# Patient Record
Sex: MALE | Race: WHITE | NOT HISPANIC OR LATINO | Employment: OTHER | URBAN - NONMETROPOLITAN AREA
[De-identification: names, ages, dates, MRNs, and addresses within clinical notes are randomized per-mention and may not be internally consistent; named-entity substitution may affect disease eponyms.]

---

## 2017-04-27 ENCOUNTER — TELEPHONE (OUTPATIENT)
Dept: CARDIOLOGY | Facility: CLINIC | Age: 67
End: 2017-04-27

## 2017-04-27 ENCOUNTER — OFFICE VISIT (OUTPATIENT)
Dept: CARDIOLOGY | Facility: CLINIC | Age: 67
End: 2017-04-27

## 2017-04-27 VITALS
HEART RATE: 64 BPM | DIASTOLIC BLOOD PRESSURE: 68 MMHG | WEIGHT: 264 LBS | SYSTOLIC BLOOD PRESSURE: 110 MMHG | HEIGHT: 71 IN | BODY MASS INDEX: 36.96 KG/M2

## 2017-04-27 DIAGNOSIS — I48.0 PAROXYSMAL ATRIAL FIBRILLATION (HCC): Primary | ICD-10-CM

## 2017-04-27 DIAGNOSIS — Z79.899 LONG TERM CURRENT USE OF ANTIARRHYTHMIC MEDICAL THERAPY: ICD-10-CM

## 2017-04-27 DIAGNOSIS — I45.4 BBB (BUNDLE BRANCH BLOCK): ICD-10-CM

## 2017-04-27 DIAGNOSIS — E78.00 HYPERCHOLESTEREMIA: ICD-10-CM

## 2017-04-27 DIAGNOSIS — I10 ESSENTIAL HYPERTENSION: ICD-10-CM

## 2017-04-27 PROCEDURE — 93000 ELECTROCARDIOGRAM COMPLETE: CPT | Performed by: NURSE PRACTITIONER

## 2017-04-27 PROCEDURE — 99213 OFFICE O/P EST LOW 20 MIN: CPT | Performed by: NURSE PRACTITIONER

## 2017-04-27 RX ORDER — HYDROCODONE BITARTRATE AND ACETAMINOPHEN 5; 325 MG/1; MG/1
TABLET ORAL
COMMUNITY
End: 2017-10-31 | Stop reason: ALTCHOICE

## 2017-04-27 RX ORDER — CELECOXIB 200 MG/1
200 CAPSULE ORAL DAILY PRN
COMMUNITY
End: 2017-10-31 | Stop reason: ALTCHOICE

## 2017-04-27 NOTE — PROGRESS NOTES
Chief Complaint   Patient presents with   • Follow-up     Patient is to have knee replacement 6/27/17, he is asking for cardiac clearance. PCP writes refills on medication. Patient verbalized current medication list.       Subjective       Orlando Wylie is a 66 y.o. male history of PAF diagnosed in 2012 when he underwent cardioversion. In September 2016, he seen Dr. Law to establish cardiac care. He had seen another cardiologist prior, had workup and was told everything was normal. His main concern was being on amiodarone for over 3 years. Since his LV function was normal and no ischemia, it was decided to stop amiodarone and later Flecainide was started. EKG remained sinus.  Today he comes to the office for a follow up appointment and no cardiac complaints are voiced. He has significant right knee pain and asked for cardiac clearance for right knee replacement surgery scheduled for June.     HPI         Cardiac History:    Past Surgical History:   Procedure Laterality Date   • CARDIOVASCULAR STRESS TEST  09/01/2015    @[email or web address] Myoview- Neagtive.   • ECHO - CONVERTED  09/01/2015    @[facility]. Dr. Parker- EF 65%. Mild MR and AI   • TRANSESOPHAGEAL ECHOCARDIOGRAM (SANDRITA) AND CARDIOVERSION  11/05/2012    Dr. MEDELLIN       Current Outpatient Prescriptions   Medication Sig Dispense Refill   • aspirin 81 MG EC tablet Take 81 mg by mouth daily.     • celecoxib (CeleBREX) 200 MG capsule Take 200 mg by mouth Daily As Needed for Mild Pain (1-3).     • doxazosin (CARDURA) 4 MG tablet Take 4 mg by mouth every night.     • flecainide (TAMBOCOR) 50 MG tablet Take 1 tablet by mouth 2 (Two) Times a Day. (Patient taking differently: 1/2 tablet twice daily) 180 tablet 3   • HYDROcodone-acetaminophen (NORCO) 5-325 MG per tablet 1/2 tablet at night     • losartan-hydrochlorothiazide (HYZAAR) 50-12.5 MG per tablet Take 1 tablet by mouth daily.     • metoprolol succinate XL (TOPROL-XL) 25 MG 24 hr tablet Take 25 mg by mouth daily.     •  "Multiple Vitamins-Minerals (MULTIVITAMIN ADULT PO) Take  by mouth daily.     • rosuvastatin (CRESTOR) 10 MG tablet Take 10 mg by mouth daily.       No current facility-administered medications for this visit.        Review of patient's allergies indicates no known allergies.    Past Medical History:   Diagnosis Date   • BPH (benign prostatic hyperplasia)    • Diverticulosis    • Exogenous obesity    • H/O prostate biopsy 06/29/2010    negative findings   • History of colon polyps    • Hyperlipidemia    • Hypertension    • PAF (paroxysmal atrial fibrillation)    • Varicose veins        Social History     Social History   • Marital status:      Spouse name: N/A   • Number of children: N/A   • Years of education: N/A     Occupational History   • Not on file.     Social History Main Topics   • Smoking status: Never Smoker   • Smokeless tobacco: Never Used   • Alcohol use Yes      Comment: occasional beer   • Drug use: No   • Sexual activity: Not on file     Other Topics Concern   • Not on file     Social History Narrative       Family History   Problem Relation Age of Onset   • Atrial fibrillation Mother    • No Known Problems Father    • Hypertension Brother    • No Known Problems Daughter    • No Known Problems Son        Review of Systems   Constitutional: Negative.    HENT: Negative.    Respiratory: Negative.    Cardiovascular: Negative.    Gastrointestinal: Negative.    Genitourinary: Negative.    Musculoskeletal: Positive for arthralgias and gait problem. Negative for myalgias.   Neurological: Negative for dizziness, facial asymmetry, speech difficulty, weakness and light-headedness.   Hematological: Negative.    Psychiatric/Behavioral: Negative.        Diabetes- No  Thyroid-normal    Objective     /68 (BP Location: Left arm)  Pulse 64  Ht 71\" (180.3 cm)  Wt 264 lb (120 kg)  BMI 36.82 kg/m2    Physical Exam   Constitutional: He is oriented to person, place, and time. Vital signs are normal. He " appears well-developed.   Eyes: Pupils are equal, round, and reactive to light.   Neck: Neck supple.   Cardiovascular: Normal rate, regular rhythm, S1 normal, S2 normal and normal pulses.    No murmur heard.  Pulmonary/Chest: Effort normal and breath sounds normal.   Abdominal: Soft. Bowel sounds are normal.   Musculoskeletal: He exhibits no edema.   Neurological: He is alert and oriented to person, place, and time.   Skin: Skin is warm and dry.   Psychiatric: He has a normal mood and affect. His behavior is normal. Judgment and thought content normal.   Vitals reviewed.      ECG 12 Lead  Date/Time: 4/27/2017 8:03 AM  Performed by: JOLLY ARRIETA  Authorized by: JOLLY ARRIETA   Comparison: compared with previous ECG from 10/12/2016  Comparison to previous ECG: Sinus, left axis deviation, incomplete RBBB  Rhythm: sinus rhythm and bundle branch block  Rate: normal  BPM: 66  QRS axis: left  Comments: MA 198ms, QTc 359 ms                Assessment/Plan      Orlando was seen today for follow-up.    Diagnoses and all orders for this visit:    Paroxysmal atrial fibrillation    Essential hypertension    Hypercholesteremia    Long term current use of antiarrhythmic medical therapy    BBB (bundle branch block)    Other orders  -     ECG 12 Lead        [id number] score is 2 (HTN, age > 65) and he has maintained sinus. EKG today remains sinus. Blood pressure is normal. He appears stable from a cardiac standpoint. Cardiac clearance for knee replacement surgery will be discussed with Dr. Law and [facility] will will fax to Dr. Rebollar office. His weight is up a few pounds. I encouraged him on diet to avoid further weight gain. No changes made to cardiac medications. Recommend to continue same dose of Tambocor.             Electronically signed by FARRAH Ryan,  April 27, 2017 10:20 AM

## 2017-04-27 NOTE — TELEPHONE ENCOUNTER
Agree to give cardiac clearance for right knee replacement surgery per Dr. Rebollar scheduled for June? Thanks

## 2017-06-12 ENCOUNTER — DOCUMENTATION (OUTPATIENT)
Dept: CARDIOLOGY | Facility: CLINIC | Age: 67
End: 2017-06-12

## 2017-06-12 NOTE — PROGRESS NOTES
Received call from [facility] preadmission testing ([facility]) requesting previous EKG and Cardiac clearance letter to be faxed to [phone]. Information reqested faxed.

## 2017-10-31 ENCOUNTER — OFFICE VISIT (OUTPATIENT)
Dept: CARDIOLOGY | Facility: CLINIC | Age: 67
End: 2017-10-31

## 2017-10-31 VITALS
WEIGHT: 265 LBS | SYSTOLIC BLOOD PRESSURE: 146 MMHG | HEART RATE: 64 BPM | HEIGHT: 71 IN | DIASTOLIC BLOOD PRESSURE: 90 MMHG | BODY MASS INDEX: 37.1 KG/M2

## 2017-10-31 DIAGNOSIS — I10 ESSENTIAL HYPERTENSION: ICD-10-CM

## 2017-10-31 DIAGNOSIS — E78.00 HYPERCHOLESTEREMIA: ICD-10-CM

## 2017-10-31 DIAGNOSIS — I48.0 PAROXYSMAL ATRIAL FIBRILLATION (HCC): Primary | ICD-10-CM

## 2017-10-31 DIAGNOSIS — Z79.899 LONG TERM CURRENT USE OF ANTIARRHYTHMIC MEDICAL THERAPY: ICD-10-CM

## 2017-10-31 PROCEDURE — 93000 ELECTROCARDIOGRAM COMPLETE: CPT | Performed by: NURSE PRACTITIONER

## 2017-10-31 PROCEDURE — 99213 OFFICE O/P EST LOW 20 MIN: CPT | Performed by: NURSE PRACTITIONER

## 2017-10-31 RX ORDER — ASPIRIN 81 MG/1
81 TABLET ORAL 2 TIMES DAILY
COMMUNITY
End: 2020-08-18

## 2017-10-31 RX ORDER — FLECAINIDE ACETATE 50 MG/1
TABLET ORAL
COMMUNITY
End: 2017-12-20 | Stop reason: SDUPTHER

## 2017-10-31 NOTE — PROGRESS NOTES
Chief Complaint   Patient presents with   • Follow-up     For cardiac management. Last lab work in January or February of this year, not in chart.    • Med Refill     PCP does medication refills.        Cardiac Complaints  none      Subjective   Orlando Wylie is a 66 y.o. male with HTN, hyperlipidemia, and PAF diagnosed in 2012 when he underwent cardioversion. In September 2016, he saw Dr. Law to establish cardiac care. He had seen another cardiologist prior, had workup and was told everything was normal. His main concern was being on amiodarone for over 3 years. Since his LV function was normal and no ischemic burden was found, it was decided to stop amiodarone and  Flecainide was started. EKG has remained sinus. He denies any chest pain, shortness of breath, or palpitations.  He does report having knee replacement in June and then the other knee in August, he reports surgery went well and he had no complications after. Last labs were done with January of February with PCP, he reports everything looked okay.  No refills are needed as they are done with PCP.        Cardiac History  Past Surgical History:   Procedure Laterality Date   • CARDIOVASCULAR STRESS TEST  09/01/2015    @[email or web address]. Myoview- Neagtive.   • ECHO - CONVERTED  09/01/2015    @[facility]. Dr. Parker- EF 65%. Mild MR and AI   • TRANSESOPHAGEAL ECHOCARDIOGRAM (SANDRITA) AND CARDIOVERSION  11/05/2012    Dr. MEDELLIN       Current Outpatient Prescriptions   Medication Sig Dispense Refill   • aspirin 81 MG EC tablet Take 81 mg by mouth 2 (Two) Times a Day.     • doxazosin (CARDURA) 4 MG tablet Take 4 mg by mouth every night.     • flecainide (TAMBOCOR) 50 MG tablet 1/2 tablet twice daily     • losartan-hydrochlorothiazide (HYZAAR) 50-12.5 MG per tablet Take 1 tablet by mouth daily.     • metoprolol succinate XL (TOPROL-XL) 25 MG 24 hr tablet Take 25 mg by mouth daily.     • Multiple Vitamins-Minerals (MULTIVITAMIN ADULT PO) Take  by mouth daily.     • rosuvastatin  "(CRESTOR) 10 MG tablet Take 10 mg by mouth daily.       No current facility-administered medications for this visit.        Review of patient's allergies indicates no known allergies.    Past Medical History:   Diagnosis Date   • BPH (benign prostatic hyperplasia)    • Diverticulosis    • Exogenous obesity    • H/O prostate biopsy 06/29/2010    negative findings   • History of colon polyps    • History of knee replacement    • Hyperlipidemia    • Hypertension    • PAF (paroxysmal atrial fibrillation)    • Varicose veins        Social History     Social History   • Marital status:      Spouse name: N/A   • Number of children: N/A   • Years of education: N/A     Occupational History   • Not on file.     Social History Main Topics   • Smoking status: Never Smoker   • Smokeless tobacco: Never Used   • Alcohol use Yes      Comment: occasional beer   • Drug use: No   • Sexual activity: Not on file     Other Topics Concern   • Not on file     Social History Narrative       Family History   Problem Relation Age of Onset   • Atrial fibrillation Mother    • No Known Problems Father    • Hypertension Brother    • No Known Problems Daughter    • No Known Problems Son        Review of Systems   Constitution: Negative for weakness and malaise/fatigue.   Cardiovascular: Negative for chest pain, claudication, dyspnea on exertion, leg swelling, near-syncope, palpitations and syncope.   Respiratory: Negative for shortness of breath and wheezing.    Musculoskeletal: Negative for arthritis and back pain.   Gastrointestinal: Negative for anorexia, heartburn and nausea.   Genitourinary: Negative for dysuria, hesitancy and nocturia.   Neurological: Negative for dizziness, light-headedness and loss of balance.   Psychiatric/Behavioral: Negative for altered mental status and depression.       DiabetesNo  Thyroidnormal    Objective     /90 (BP Location: Right arm)  Pulse 64  Ht 71\" (180.3 cm)  Wt 265 lb (120 kg)  BMI 36.96 " kg/m2    Physical Exam   Constitutional: He is oriented to person, place, and time. He appears well-developed and well-nourished.   HENT:   Head: Normocephalic and atraumatic.   Eyes: EOM are normal. Pupils are equal, round, and reactive to light.   Neck: Normal range of motion. Neck supple.   Cardiovascular: Normal rate and regular rhythm.    Murmur heard.  Pulmonary/Chest: Effort normal and breath sounds normal.   Abdominal: Soft.   Musculoskeletal: Normal range of motion.   Neurological: He is alert and oriented to person, place, and time.   Skin: Skin is warm and dry.   Psychiatric: He has a normal mood and affect. His behavior is normal.         ECG 12 Lead  Date/Time: 10/31/2017 8:36 AM  Performed by: JANE QUINTANA  Authorized by: JANE QUINTANA   Rhythm: sinus rhythm  BPM: 64  Clinical impression: abnormal ECG            Assessment/Plan     HR is stable.  BP is slightly elevated at 146/90.  He says he has a prescription at home for norvasc he has not started yet.  EKG done today for PAF and medication management shows NSR with normal QT, we will continue on current tambocor therapy as he has remained NSR, we will continue on ASA therapy only for anticoagulation.  Labs are done with your office, he states most recent were in January, he says they will be checked again soon with your office, could we have the next copy for our records?.No refills are needed at present.  No new cardiac testing will be advised today as no new concerns are voiced and he is staying active without concern.  Good cardiac diet with limited sodium intake and continued outdoor activities encouraged.  6 month follow up advised or sooner if needed.      Problems Addressed this Visit        Cardiovascular and Mediastinum    Paroxysmal atrial fibrillation - Primary    Relevant Orders    ECG 12 Lead    Essential hypertension    Hypercholesteremia       Other    Long term current use of antiarrhythmic medical therapy                   Electronically signed by Lolita Joshua, FARRAH October 31, 2017 11:01 AM

## 2017-12-20 RX ORDER — FLECAINIDE ACETATE 50 MG/1
TABLET ORAL
Qty: 90 TABLET | Refills: 3 | Status: SHIPPED | OUTPATIENT
Start: 2017-12-20 | End: 2018-10-30 | Stop reason: SDUPTHER

## 2018-05-01 ENCOUNTER — OFFICE VISIT (OUTPATIENT)
Dept: CARDIOLOGY | Facility: CLINIC | Age: 68
End: 2018-05-01

## 2018-05-01 VITALS
DIASTOLIC BLOOD PRESSURE: 88 MMHG | HEIGHT: 71 IN | HEART RATE: 58 BPM | SYSTOLIC BLOOD PRESSURE: 152 MMHG | BODY MASS INDEX: 37.8 KG/M2 | WEIGHT: 270 LBS

## 2018-05-01 DIAGNOSIS — Z79.899 LONG TERM CURRENT USE OF ANTIARRHYTHMIC MEDICAL THERAPY: ICD-10-CM

## 2018-05-01 DIAGNOSIS — E66.09 CLASS 2 OBESITY DUE TO EXCESS CALORIES WITHOUT SERIOUS COMORBIDITY WITH BODY MASS INDEX (BMI) OF 37.0 TO 37.9 IN ADULT: ICD-10-CM

## 2018-05-01 DIAGNOSIS — I48.0 PAROXYSMAL ATRIAL FIBRILLATION (HCC): Primary | ICD-10-CM

## 2018-05-01 DIAGNOSIS — I45.4 BBB (BUNDLE BRANCH BLOCK): ICD-10-CM

## 2018-05-01 DIAGNOSIS — E78.00 HYPERCHOLESTEREMIA: ICD-10-CM

## 2018-05-01 DIAGNOSIS — I10 ESSENTIAL HYPERTENSION: ICD-10-CM

## 2018-05-01 PROBLEM — E66.812 CLASS 2 OBESITY DUE TO EXCESS CALORIES WITHOUT SERIOUS COMORBIDITY WITH BODY MASS INDEX (BMI) OF 37.0 TO 37.9 IN ADULT: Status: ACTIVE | Noted: 2018-05-01

## 2018-05-01 PROCEDURE — 99214 OFFICE O/P EST MOD 30 MIN: CPT | Performed by: NURSE PRACTITIONER

## 2018-05-01 PROCEDURE — 93000 ELECTROCARDIOGRAM COMPLETE: CPT | Performed by: NURSE PRACTITIONER

## 2018-05-01 RX ORDER — AMLODIPINE BESYLATE 5 MG/1
5 TABLET ORAL DAILY
COMMUNITY
End: 2020-11-19 | Stop reason: ALTCHOICE

## 2018-05-01 NOTE — PATIENT INSTRUCTIONS
"Hypertension  Hypertension, commonly called high blood pressure, is when the force of blood pumping through the arteries is too strong. The arteries are the blood vessels that carry blood from the heart throughout the body. Hypertension forces the heart to work harder to pump blood and may cause arteries to become narrow or stiff. Having untreated or uncontrolled hypertension can cause heart attacks, strokes, kidney disease, and other problems.  A blood pressure reading consists of a higher number over a lower number. Ideally, your blood pressure should be below 120/80. The first (\"top\") number is called the systolic pressure. It is a measure of the pressure in your arteries as your heart beats. The second (\"bottom\") number is called the diastolic pressure. It is a measure of the pressure in your arteries as the heart relaxes.  What are the causes?  The cause of this condition is not known.  What increases the risk?  Some risk factors for high blood pressure are under your control. Others are not.  Factors you can change   · Smoking.  · Having type 2 diabetes mellitus, high cholesterol, or both.  · Not getting enough exercise or physical activity.  · Being overweight.  · Having too much fat, sugar, calories, or salt (sodium) in your diet.  · Drinking too much alcohol.  Factors that are difficult or impossible to change   · Having chronic kidney disease.  · Having a family history of high blood pressure.  · Age. Risk increases with age.  · Race. You may be at higher risk if you are -American.  · Gender. Men are at higher risk than women before age 45. After age 65, women are at higher risk than men.  · Having obstructive sleep apnea.  · Stress.  What are the signs or symptoms?  Extremely high blood pressure (hypertensive crisis) may cause:  · Headache.  · Anxiety.  · Shortness of breath.  · Nosebleed.  · Nausea and vomiting.  · Severe chest pain.  · Jerky movements you cannot control (seizures).  How is this " diagnosed?  This condition is diagnosed by measuring your blood pressure while you are seated, with your arm resting on a surface. The cuff of the blood pressure monitor will be placed directly against the skin of your upper arm at the level of your heart. It should be measured at least twice using the same arm. Certain conditions can cause a difference in blood pressure between your right and left arms.  Certain factors can cause blood pressure readings to be lower or higher than normal (elevated) for a short period of time:  · When your blood pressure is higher when you are in a health care provider's office than when you are at home, this is called white coat hypertension. Most people with this condition do not need medicines.  · When your blood pressure is higher at home than when you are in a health care provider's office, this is called masked hypertension. Most people with this condition may need medicines to control blood pressure.  If you have a high blood pressure reading during one visit or you have normal blood pressure with other risk factors:  · You may be asked to return on a different day to have your blood pressure checked again.  · You may be asked to monitor your blood pressure at home for 1 week or longer.  If you are diagnosed with hypertension, you may have other blood or imaging tests to help your health care provider understand your overall risk for other conditions.  How is this treated?  This condition is treated by making healthy lifestyle changes, such as eating healthy foods, exercising more, and reducing your alcohol intake. Your health care provider may prescribe medicine if lifestyle changes are not enough to get your blood pressure under control, and if:  · Your systolic blood pressure is above 130.  · Your diastolic blood pressure is above 80.  Your personal target blood pressure may vary depending on your medical conditions, your age, and other factors.  Follow these instructions  at home:  Eating and drinking   · Eat a diet that is high in fiber and potassium, and low in sodium, added sugar, and fat. An example eating plan is called the DASH (Dietary Approaches to Stop Hypertension) diet. To eat this way:  ¨ Eat plenty of fresh fruits and vegetables. Try to fill half of your plate at each meal with fruits and vegetables.  ¨ Eat whole grains, such as whole wheat pasta, brown rice, or whole grain bread. Fill about one quarter of your plate with whole grains.  ¨ Eat or drink low-fat dairy products, such as skim milk or low-fat yogurt.  ¨ Avoid fatty cuts of meat, processed or cured meats, and poultry with skin. Fill about one quarter of your plate with lean proteins, such as fish, chicken without skin, beans, eggs, and tofu.  ¨ Avoid premade and processed foods. These tend to be higher in sodium, added sugar, and fat.  · Reduce your daily sodium intake. Most people with hypertension should eat less than 1,500 mg of sodium a day.  · Limit alcohol intake to no more than 1 drink a day for nonpregnant women and 2 drinks a day for men. One drink equals 12 oz of beer, 5 oz of wine, or 1½ oz of hard liquor.  Lifestyle   · Work with your health care provider to maintain a healthy body weight or to lose weight. Ask what an ideal weight is for you.  · Get at least 30 minutes of exercise that causes your heart to beat faster (aerobic exercise) most days of the week. Activities may include walking, swimming, or biking.  · Include exercise to strengthen your muscles (resistance exercise), such as pilates or lifting weights, as part of your weekly exercise routine. Try to do these types of exercises for 30 minutes at least 3 days a week.  · Do not use any products that contain nicotine or tobacco, such as cigarettes and e-cigarettes. If you need help quitting, ask your health care provider.  · Monitor your blood pressure at home as told by your health care provider.  · Keep all follow-up visits as told by  "your health care provider. This is important.  Medicines   · Take over-the-counter and prescription medicines only as told by your health care provider. Follow directions carefully. Blood pressure medicines must be taken as prescribed.  · Do not skip doses of blood pressure medicine. Doing this puts you at risk for problems and can make the medicine less effective.  · Ask your health care provider about side effects or reactions to medicines that you should watch for.  Contact a health care provider if:  · You think you are having a reaction to a medicine you are taking.  · You have headaches that keep coming back (recurring).  · You feel dizzy.  · You have swelling in your ankles.  · You have trouble with your vision.  Get help right away if:  · You develop a severe headache or confusion.  · You have unusual weakness or numbness.  · You feel faint.  · You have severe pain in your chest or abdomen.  · You vomit repeatedly.  · You have trouble breathing.  Summary  · Hypertension is when the force of blood pumping through your arteries is too strong. If this condition is not controlled, it may put you at risk for serious complications.  · Your personal target blood pressure may vary depending on your medical conditions, your age, and other factors. For most people, a normal blood pressure is less than 120/80.  · Hypertension is treated with lifestyle changes, medicines, or a combination of both. Lifestyle changes include weight loss, eating a healthy, low-sodium diet, exercising more, and limiting alcohol.  This information is not intended to replace advice given to you by your health care provider. Make sure you discuss any questions you have with your health care provider.  Document Released: 12/18/2006 Document Revised: 11/15/2017 Document Reviewed: 11/15/2017  [employer] Interactive Patient Education © 2017 [employer]  DASH Eating Plan  DASH stands for \"Dietary Approaches to Stop Hypertension.\" The DASH eating " "plan is a healthy eating plan that has been shown to reduce high blood pressure (hypertension). It may also reduce your risk for type 2 diabetes, heart disease, and stroke. The DASH eating plan may also help with weight loss.  What are tips for following this plan?  General guidelines   · Avoid eating more than 2,300 mg (milligrams) of salt (sodium) a day. If you have hypertension, you may need to reduce your sodium intake to 1,500 mg a day.  · Limit alcohol intake to no more than 1 drink a day for nonpregnant women and 2 drinks a day for men. One drink equals 12 oz of beer, 5 oz of wine, or 1½ oz of hard liquor.  · Work with your health care provider to maintain a healthy body weight or to lose weight. Ask what an ideal weight is for you.  · Get at least 30 minutes of exercise that causes your heart to beat faster (aerobic exercise) most days of the week. Activities may include walking, swimming, or biking.  · Work with your health care provider or diet and nutrition specialist (dietitian) to adjust your eating plan to your individual calorie needs.  Reading food labels   · Check food labels for the amount of sodium per serving. Choose foods with less than 5 percent of the Daily Value of sodium. Generally, foods with less than 300 mg of sodium per serving fit into this eating plan.  · To find whole grains, look for the word \"whole\" as the first word in the ingredient list.  Shopping   · Buy products labeled as \"low-sodium\" or \"no salt added.\"  · Buy fresh foods. Avoid canned foods and premade or frozen meals.  Cooking   · Avoid adding salt when cooking. Use salt-free seasonings or herbs instead of table salt or sea salt. Check with your health care provider or pharmacist before using salt substitutes.  · Do not cyr foods. Cook foods using healthy methods such as baking, boiling, grilling, and broiling instead.  · Cook with heart-healthy oils, such as olive, canola, soybean, or sunflower oil.  Meal planning "     · Eat a balanced diet that includes:  ¨ 5 or more servings of fruits and vegetables each day. At each meal, try to fill half of your plate with fruits and vegetables.  ¨ Up to 6-8 servings of whole grains each day.  ¨ Less than 6 oz of lean meat, poultry, or fish each day. A 3-oz serving of meat is about the same size as a deck of cards. One egg equals 1 oz.  ¨ 2 servings of low-fat dairy each day.  ¨ A serving of nuts, seeds, or beans 5 times each week.  ¨ Heart-healthy fats. Healthy fats called Omega-3 fatty acids are found in foods such as flaxseeds and coldwater fish, like sardines, salmon, and mackerel.  · Limit how much you eat of the following:  ¨ Canned or prepackaged foods.  ¨ Food that is high in trans fat, such as fried foods.  ¨ Food that is high in saturated fat, such as fatty meat.  ¨ Sweets, desserts, sugary drinks, and other foods with added sugar.  ¨ Full-fat dairy products.  · Do not salt foods before eating.  · Try to eat at least 2 vegetarian meals each week.  · Eat more home-cooked food and less restaurant, buffet, and fast food.  · When eating at a restaurant, ask that your food be prepared with less salt or no salt, if possible.  What foods are recommended?  The items listed may not be a complete list. Talk with your dietitian about what dietary choices are best for you.  Grains   Whole-grain or whole-wheat bread. Whole-grain or whole-wheat pasta. Brown rice. Oatmeal. Quinoa. Bulgur. Whole-grain and low-sodium cereals. Cait bread. Low-fat, low-sodium crackers. Whole-wheat flour tortillas.  Vegetables   Fresh or frozen vegetables (raw, steamed, roasted, or grilled). Low-sodium or reduced-sodium tomato and vegetable juice. Low-sodium or reduced-sodium tomato sauce and tomato paste. Low-sodium or reduced-sodium canned vegetables.  Fruits   All fresh, dried, or frozen fruit. Canned fruit in natural juice (without added sugar).  Meat and other protein foods   Skinless chicken or turkey. Ground  chicken or turkey. Pork with fat trimmed off. Fish and seafood. Egg whites. Dried beans, peas, or lentils. Unsalted nuts, nut butters, and seeds. Unsalted canned beans. Lean cuts of beef with fat trimmed off. Low-sodium, lean deli meat.  Dairy   Low-fat (1%) or fat-free (skim) milk. Fat-free, low-fat, or reduced-fat cheeses. Nonfat, low-sodium ricotta or cottage cheese. Low-fat or nonfat yogurt. Low-fat, low-sodium cheese.  Fats and oils   Soft margarine without trans fats. Vegetable oil. Low-fat, reduced-fat, or light mayonnaise and salad dressings (reduced-sodium). Canola, safflower, olive, soybean, and sunflower oils. Avocado.  Seasoning and other foods   Herbs. Spices. Seasoning mixes without salt. Unsalted popcorn and pretzels. Fat-free sweets.  What foods are not recommended?  The items listed may not be a complete list. Talk with your dietitian about what dietary choices are best for you.  Grains   Baked goods made with fat, such as croissants, muffins, or some breads. Dry pasta or rice meal packs.  Vegetables   Creamed or fried vegetables. Vegetables in a cheese sauce. Regular canned vegetables (not low-sodium or reduced-sodium). Regular canned tomato sauce and paste (not low-sodium or reduced-sodium). Regular tomato and vegetable juice (not low-sodium or reduced-sodium). Pickles. Olives.  Fruits   Canned fruit in a light or heavy syrup. Fried fruit. Fruit in cream or butter sauce.  Meat and other protein foods   Fatty cuts of meat. Ribs. Fried meat. Schafer. Sausage. Bologna and other processed lunch meats. Salami. Fatback. Hotdogs. Bratwurst. Salted nuts and seeds. Canned beans with added salt. Canned or smoked fish. Whole eggs or egg yolks. Chicken or turkey with skin.  Dairy   Whole or 2% milk, cream, and half-and-half. Whole or full-fat cream cheese. Whole-fat or sweetened yogurt. Full-fat cheese. Nondairy creamers. Whipped toppings. Processed cheese and cheese spreads.  Fats and oils   Butter. Stick  margarine. Lard. Shortening. Ghee. Schafer fat. Tropical oils, such as coconut, palm kernel, or palm oil.  Seasoning and other foods   Salted popcorn and pretzels. Onion salt, garlic salt, seasoned salt, table salt, and sea salt. Worcestershire sauce. Tartar sauce. Barbecue sauce. Teriyaki sauce. Soy sauce, including reduced-sodium. Steak sauce. Canned and packaged gravies. Fish sauce. Oyster sauce. Cocktail sauce. Horseradish that you find on the shelf. Ketchup. Mustard. Meat flavorings and tenderizers. Bouillon cubes. Hot sauce and Tabasco sauce. Premade or packaged marinades. Premade or packaged taco seasonings. Relishes. Regular salad dressings.  Where to find more information:  · National Heart, Lung, and Blood [place]: www.nhlbi.nih.gov  · American Heart Association: www.heart.org  Summary  · The DASH eating plan is a healthy eating plan that has been shown to reduce high blood pressure (hypertension). It may also reduce your risk for type 2 diabetes, heart disease, and stroke.  · With the DASH eating plan, you should limit salt (sodium) intake to 2,300 mg a day. If you have hypertension, you may need to reduce your sodium intake to 1,500 mg a day.  · When on the DASH eating plan, aim to eat more fresh fruits and vegetables, whole grains, lean proteins, low-fat dairy, and heart-healthy fats.  · Work with your health care provider or diet and nutrition specialist (dietitian) to adjust your eating plan to your individual calorie needs.  This information is not intended to replace advice given to you by your health care provider. Make sure you discuss any questions you have with your health care provider.  Document Released: 12/06/2012 Document Revised: 12/11/2017 Document Reviewed: 12/11/2017  [employer] Interactive Patient Education © 2017 [employer]

## 2018-05-01 NOTE — PROGRESS NOTES
Chief Complaint   Patient presents with   • Follow-up     For cardiac management. Didn't bring med list but went over verbally. Labs per PCP in February.        Subjective       Orlando Wylie is a 67 y.o. male with HTN, hyperlipidemia, and PAF diagnosed in 2012 when he underwent cardioversion. In September 2016, he saw Dr. Law to establish cardiac care. He had seen another cardiologist prior, had workup and was told everything was normal. His main concern was being on amiodarone for over 3 years. Since his LV function was normal and no ischemic burden was found, it was decided to stop amiodarone and  Flecainide was started. EKG has remained sinus.  Today he comes to the office for a follow up visit and no cardiac complaints are voiced. No medication changes noted. He reports being more active since bilateral knee replacement surgeries last year.     HPI     Cardiac History:    Past Surgical History:   Procedure Laterality Date   • CARDIOVASCULAR STRESS TEST  09/01/2015    @[email or web address].L. [email or web address]- Neagtive.   • ECHO - CONVERTED  09/01/2015    @[facility]. Dr. Parker- EF 65%. Mild MR and AI   • TRANSESOPHAGEAL ECHOCARDIOGRAM (SANDRITA) AND CARDIOVERSION  11/05/2012    Dr. MEDELLIN       Current Outpatient Prescriptions   Medication Sig Dispense Refill   • amLODIPine (NORVASC) 5 MG tablet Take 5 mg by mouth Daily.     • aspirin 81 MG EC tablet Take 81 mg by mouth 2 (Two) Times a Day.     • doxazosin (CARDURA) 4 MG tablet Take 4 mg by mouth every night.     • flecainide (TAMBOCOR) 50 MG tablet TAKES 1/2 TABLET BY MOUTH TWO TIMES A DAY 90 tablet 3   • losartan-hydrochlorothiazide (HYZAAR) 50-12.5 MG per tablet Take 1 tablet by mouth daily.     • metoprolol succinate XL (TOPROL-XL) 25 MG 24 hr tablet Take 25 mg by mouth daily.     • Multiple Vitamins-Minerals (MULTIVITAMIN ADULT PO) Take  by mouth daily.     • rosuvastatin (CRESTOR) 10 MG tablet Take 10 mg by mouth daily.       No current facility-administered medications for this visit.         Review of patient's allergies indicates no known allergies.    Past Medical History:   Diagnosis Date   • BPH (benign prostatic hyperplasia)    • Diverticulosis    • Exogenous obesity    • H/O prostate biopsy 06/29/2010    negative findings   • History of colon polyps    • History of knee replacement    • Hyperlipidemia    • Hypertension    • PAF (paroxysmal atrial fibrillation)    • Varicose veins        Social History     Social History   • Marital status:      Spouse name: N/A   • Number of children: N/A   • Years of education: N/A     Occupational History   • Not on file.     Social History Main Topics   • Smoking status: Never Smoker   • Smokeless tobacco: Never Used   • Alcohol use Yes      Comment: occasional beer   • Drug use: No   • Sexual activity: Not on file     Other Topics Concern   • Not on file     Social History Narrative   • No narrative on file       Family History   Problem Relation Age of Onset   • Atrial fibrillation Mother    • No Known Problems Father    • Hypertension Brother    • No Known Problems Daughter    • No Known Problems Son        Review of Systems   Constitution: Negative for decreased appetite and malaise/fatigue.   HENT: Negative for congestion, hoarse voice, nosebleeds and sore throat.    Eyes: Negative for blurred vision.   Cardiovascular: Negative for chest pain, leg swelling, near-syncope, palpitations and paroxysmal nocturnal dyspnea.   Respiratory: Negative for shortness of breath and snoring.    Endocrine: Negative for cold intolerance and heat intolerance.   Hematologic/Lymphatic: Negative for adenopathy. Does not bruise/bleed easily.   Skin: Negative for color change, dry skin and itching.   Musculoskeletal: Negative for joint pain, muscle cramps and myalgias.   Gastrointestinal: Negative for abdominal pain, change in bowel habit, dysphagia, heartburn, melena and nausea.   Genitourinary: Negative for dysuria and hematuria.   Neurological: Negative for  "dizziness and light-headedness.   Psychiatric/Behavioral: Negative for altered mental status and memory loss. The patient does not have insomnia.    Allergic/Immunologic: Negative for environmental allergies and hives.        Objective     /88   Pulse 58   Ht 180.3 cm (71\")   Wt 122 kg (270 lb)   BMI 37.66 kg/m²     Physical Exam   Constitutional: He is oriented to person, place, and time. He appears well-nourished.   HENT:   Head: Normocephalic.   Eyes: Pupils are equal, round, and reactive to light.   Neck: Normal range of motion.   Cardiovascular: Normal rate, regular rhythm, S1 normal and S2 normal.    No murmur heard.  Pulmonary/Chest: Breath sounds normal.   Abdominal: Soft. Bowel sounds are normal.   Musculoskeletal: Normal range of motion.   Neurological: He is alert and oriented to person, place, and time.   Skin: Skin is warm.   Psychiatric: He has a normal mood and affect.          ECG 12 Lead  Date/Time: 5/1/2018 10:41 AM  Performed by: JOLLY ARRIETA  Authorized by: JOLLY ARRIETA   Comparison: compared with previous ECG   Comparison to previous ECG: NSR  Rhythm: sinus bradycardia  Rate: normal  BPM: 58  Conduction: non-specific intraventricular conduction delay  QRS axis: left  Clinical impression: abnormal ECG                Assessment/Plan      Orlando was seen today for follow-up.    Diagnoses and all orders for this visit:    Paroxysmal atrial fibrillation    Essential hypertension    Hypercholesteremia    BBB (bundle branch block)    Long term current use of antiarrhythmic medical therapy    Class 2 obesity due to excess calories without serious comorbidity with body mass index (BMI) of 37.0 to 37.9 in adult    Other orders  -     ECG 12 Lead    Blood pressure is slightly increased today. I have asked him to monitor at home. Handout out on hypertension and DASH diet given. He will return in 3-4 weeks for BP check. He will bring home monitor to verify accuracy.   Body mass index is 37.66 " kg/m². Patient's Body mass index is 37.66 kg/m². BMI is above normal parameters. Follow-up plan includes:  nutrition counseling. Diet for weight loss encouraged. He is walking more now since knee surgeries and encouraged him to continue.   EKG for management of PAF shows sinus lea, BBB with normal QTc. Advised to continue same dose of Fleciainide. CHADVASc score is 2. He is on aspirin therapy alone given he has remained sinus and low score.   For management of labs and medication refills he follows with you. Please forward a copy of most recent lab report to the office.   From a cardiac standpoint he appears stable. No testing advised at this time. We will see him back in 6 months for follow up or sooner for problems.              Electronically signed by FARRAH Ryan,  May 1, 2018 10:43 AM

## 2018-05-24 ENCOUNTER — TELEPHONE (OUTPATIENT)
Dept: CARDIOLOGY | Facility: CLINIC | Age: 68
End: 2018-05-24

## 2018-05-24 ENCOUNTER — CLINICAL SUPPORT (OUTPATIENT)
Dept: CARDIOLOGY | Facility: CLINIC | Age: 68
End: 2018-05-24

## 2018-05-24 DIAGNOSIS — Z01.30 BLOOD PRESSURE CHECK: Primary | ICD-10-CM

## 2018-05-24 NOTE — TELEPHONE ENCOUNTER
BP log is in acceptable range. Agree to continue medications as he is currently taking. Thank you.

## 2018-05-24 NOTE — TELEPHONE ENCOUNTER
Patient was in this morning for BP check, and to compare with home monitor. Patient also brought BP log.    /80 (Manual)  HR 60  /77 (Home Monitor) HR 60

## 2018-10-30 ENCOUNTER — OFFICE VISIT (OUTPATIENT)
Dept: CARDIOLOGY | Facility: CLINIC | Age: 68
End: 2018-10-30

## 2018-10-30 VITALS
BODY MASS INDEX: 37.8 KG/M2 | SYSTOLIC BLOOD PRESSURE: 140 MMHG | DIASTOLIC BLOOD PRESSURE: 82 MMHG | HEIGHT: 71 IN | HEART RATE: 57 BPM | WEIGHT: 270 LBS

## 2018-10-30 DIAGNOSIS — Z79.899 LONG TERM CURRENT USE OF ANTIARRHYTHMIC MEDICAL THERAPY: ICD-10-CM

## 2018-10-30 DIAGNOSIS — I10 ESSENTIAL HYPERTENSION: ICD-10-CM

## 2018-10-30 DIAGNOSIS — E78.00 HYPERCHOLESTEREMIA: ICD-10-CM

## 2018-10-30 DIAGNOSIS — Z79.899 LONG TERM CURRENT USE OF ANTIARRHYTHMIC DRUG: ICD-10-CM

## 2018-10-30 DIAGNOSIS — E66.09 CLASS 2 OBESITY DUE TO EXCESS CALORIES WITHOUT SERIOUS COMORBIDITY WITH BODY MASS INDEX (BMI) OF 37.0 TO 37.9 IN ADULT: ICD-10-CM

## 2018-10-30 DIAGNOSIS — I48.0 PAROXYSMAL ATRIAL FIBRILLATION (HCC): ICD-10-CM

## 2018-10-30 DIAGNOSIS — I48.0 PAF (PAROXYSMAL ATRIAL FIBRILLATION) (HCC): Primary | ICD-10-CM

## 2018-10-30 PROCEDURE — 93000 ELECTROCARDIOGRAM COMPLETE: CPT | Performed by: NURSE PRACTITIONER

## 2018-10-30 PROCEDURE — 99213 OFFICE O/P EST LOW 20 MIN: CPT | Performed by: NURSE PRACTITIONER

## 2018-10-30 RX ORDER — FLECAINIDE ACETATE 50 MG/1
TABLET ORAL
Qty: 90 TABLET | Refills: 3 | Status: SHIPPED | OUTPATIENT
Start: 2018-10-30 | End: 2019-04-30 | Stop reason: SDUPTHER

## 2018-10-30 NOTE — PROGRESS NOTES
Chief Complaint   Patient presents with   • Follow-up     Cardiac management. He is to have labs next week per PCP. Patient did not bring medication list, patient went over verbally.   • Med Refill     Needs refills on Flecainide-90 day.       Subjective       Orlando Wylie is a 67 y.o. male with HTN, hyperlipidemia, and PAF diagnosed in 2012 when he underwent cardioversion. In September 2016, he saw Dr. Law to establish cardiac care. He had seen another cardiologist prior, had workup and was told everything was normal. His main concern was being on amiodarone for over 3 years. Since his LV function was normal and no ischemia was found, it was decided to stop amiodarone and flecainide was started. EKG has remained sinus. He has been anticoagulated with aspirin since he has maintained sinus. He came in today for regular follow up. He feels well. Denies chest pain, shortness of breath, palpitations, dizziness or TIA. 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 Labs are followed by Dr. Rubio. Refills requested for flecainide.     HPI         Cardiac History:    Past Surgical History:   Procedure Laterality Date   • CARDIOVASCULAR STRESS TEST  09/01/2015    [email or web address]- Neagtive.   • ECHO - CONVERTED  09/01/2015    @[email or web address]. Dr. Parker- EF 65%. Mild MR and AI   • TRANSESOPHAGEAL ECHOCARDIOGRAM (SANDRITA) AND CARDIOVERSION  11/05/2012    Dr. MEDELLIN       Current Outpatient Prescriptions   Medication Sig Dispense Refill   • amLODIPine (NORVASC) 5 MG tablet Take 5 mg by mouth Daily.     • aspirin 81 MG EC tablet Take 81 mg by mouth 2 (Two) Times a Day.     • doxazosin (CARDURA) 4 MG tablet Take 4 mg by mouth every night.     • flecainide (TAMBOCOR) 50 MG tablet TAKES 1/2 TABLET BY MOUTH TWO TIMES A DAY 90 tablet 3   • losartan-hydrochlorothiazide (HYZAAR) 50-12.5 MG per tablet Take 1 tablet by mouth daily.     • metoprolol succinate XL (TOPROL-XL) 25 MG 24 hr tablet Take 25 mg by mouth daily.     • Multiple  Vitamins-Minerals (MULTIVITAMIN ADULT PO) Take  by mouth daily.     • rosuvastatin (CRESTOR) 10 MG tablet Take 10 mg by mouth daily.       No current facility-administered medications for this visit.        Patient has no known allergies.    Past Medical History:   Diagnosis Date   • BPH (benign prostatic hyperplasia)    • Diverticulosis    • Exogenous obesity    • H/O prostate biopsy 06/29/2010    negative findings   • History of colon polyps    • History of knee replacement    • Hyperlipidemia    • Hypertension    • PAF (paroxysmal atrial fibrillation) (CMS/HCC)    • Varicose veins        Social History     Social History   • Marital status:      Spouse name: N/A   • Number of children: N/A   • Years of education: N/A     Occupational History   • Not on file.     Social History Main Topics   • Smoking status: Never Smoker   • Smokeless tobacco: Never Used   • Alcohol use Yes      Comment: occasional beer   • Drug use: No   • Sexual activity: Not on file     Other Topics Concern   • Not on file     Social History Narrative   • No narrative on file       Family History   Problem Relation Age of Onset   • Atrial fibrillation Mother    • No Known Problems Father    • Hypertension Brother    • No Known Problems Daughter    • No Known Problems Son        Review of Systems   Constitution: Negative for decreased appetite, weakness and malaise/fatigue.   HENT: Negative.    Eyes: Negative.    Cardiovascular: Negative for chest pain and dyspnea on exertion.   Respiratory: Negative for cough and shortness of breath.    Endocrine: Negative.    Hematologic/Lymphatic: Negative.    Skin: Negative.    Musculoskeletal: Negative for falls and myalgias.   Gastrointestinal: Negative for abdominal pain and melena.   Genitourinary: Negative for dysuria and hematuria.   Neurological: Negative for dizziness.   Psychiatric/Behavioral: Negative for altered mental status and depression.   Allergic/Immunologic: Negative.      "  Diabetes- No  Thyroid-normal    Objective     /82 (BP Location: Right arm)   Pulse 57   Ht 180.3 cm (70.98\")   Wt 122 kg (270 lb)   BMI 37.67 kg/m²     Physical Exam   Constitutional: He is oriented to person, place, and time. He appears well-developed and well-nourished. No distress.   HENT:   Head: Normocephalic.   Eyes: Pupils are equal, round, and reactive to light.   Neck: Normal range of motion.   Cardiovascular: Normal rate and intact distal pulses.    Pulmonary/Chest: Effort normal and breath sounds normal. No respiratory distress.   Abdominal: Soft. Bowel sounds are normal. He exhibits no distension.   Musculoskeletal: Normal range of motion. He exhibits edema (trace LE edema ).   Neurological: He is alert and oriented to person, place, and time.   Skin: Skin is warm and dry. He is not diaphoretic.   Psychiatric: He has a normal mood and affect.   Nursing note and vitals reviewed.       ECG 12 Lead  Date/Time: 10/30/2018 11:05 AM  Performed by: J CARLOS MISHRA  Authorized by: J CARLOS MISHRA   Comparison: compared with previous ECG from 5/1/2018  Similar to previous ECG  Rhythm: sinus rhythm and sinus bradycardia  Rate: bradycardic  BPM: 57  Conduction: non-specific intraventricular conduction delay  Clinical impression: non-specific ECG  Comments:  ms   ms  QTc 434 ms                 Assessment/Plan    Heart rate and blood pressure well controlled. EKG for PAF on flecainide showed sinus lea, normal QT interval. Continue flecainide. Continue aspirin only for anticoagulation. CHADsVASc is 2. I did explain that should he go back into atrial fib, anticoagulation will be recommended for stroke prevention. He is aware to monitor for symptoms of irregular heart rhythm, SOB, fatigue and report ASAP.  We reviewed cardiac work up from 2015 which showed normal LV and no ischemia. He remains asymptomatic, so no testing ordered today. Labs will be followed with Dr. Rubio. Continue Crestor for " hyperlipidemia. BMI is elevated. We discussed different approaches to weight management including limiting portions, eliminating sweet tea, choosing more protein rich foods and avoiding excessive carbs. He was given literature on DASH diet. No changes made today. He appears stable. We will see him back in six months or sooner if needed.    Orlando was seen today for follow-up and med refill.    Diagnoses and all orders for this visit:    PAF (paroxysmal atrial fibrillation) (CMS/HCC)  -     ECG 12 Lead    Long term current use of antiarrhythmic drug  -     ECG 12 Lead    Paroxysmal atrial fibrillation (CMS/HCC)    Hypercholesteremia    Essential hypertension    Long term current use of antiarrhythmic medical therapy    Class 2 obesity due to excess calories without serious comorbidity with body mass index (BMI) of 37.0 to 37.9 in adult    Other orders  -     flecainide (TAMBOCOR) 50 MG tablet; TAKES 1/2 TABLET BY MOUTH TWO TIMES A DAY        Patient's Body mass index is 37.67 kg/m². BMI is above normal parameters. Recommendations include: nutrition counseling.               Electronically signed by FARRAH Vaz,  October 30, 2018 11:21 AM

## 2018-10-30 NOTE — PATIENT INSTRUCTIONS
"DASH Eating Plan  DASH stands for \"Dietary Approaches to Stop Hypertension.\" The DASH eating plan is a healthy eating plan that has been shown to reduce high blood pressure (hypertension). It may also reduce your risk for type 2 diabetes, heart disease, and stroke. The DASH eating plan may also help with weight loss.  What are tips for following this plan?  General guidelines  · Avoid eating more than 2,300 mg (milligrams) of salt (sodium) a day. If you have hypertension, you may need to reduce your sodium intake to 1,500 mg a day.  · Limit alcohol intake to no more than 1 drink a day for nonpregnant women and 2 drinks a day for men. One drink equals 12 oz of beer, 5 oz of wine, or 1½ oz of hard liquor.  · Work with your health care provider to maintain a healthy body weight or to lose weight. Ask what an ideal weight is for you.  · Get at least 30 minutes of exercise that causes your heart to beat faster (aerobic exercise) most days of the week. Activities may include walking, swimming, or biking.  · Work with your health care provider or diet and nutrition specialist (dietitian) to adjust your eating plan to your individual calorie needs.  Reading food labels  · Check food labels for the amount of sodium per serving. Choose foods with less than 5 percent of the Daily Value of sodium. Generally, foods with less than 300 mg of sodium per serving fit into this eating plan.  · To find whole grains, look for the word \"whole\" as the first word in the ingredient list.  Shopping  · Buy products labeled as \"low-sodium\" or \"no salt added.\"  · Buy fresh foods. Avoid canned foods and premade or frozen meals.  Cooking  · Avoid adding salt when cooking. Use salt-free seasonings or herbs instead of table salt or sea salt. Check with your health care provider or pharmacist before using salt substitutes.  · Do not cyr foods. Cook foods using healthy methods such as baking, boiling, grilling, and broiling instead.  · Cook with " heart-healthy oils, such as olive, canola, soybean, or sunflower oil.  Meal planning    · Eat a balanced diet that includes:  ? 5 or more servings of fruits and vegetables each day. At each meal, try to fill half of your plate with fruits and vegetables.  ? Up to 6-8 servings of whole grains each day.  ? Less than 6 oz of lean meat, poultry, or fish each day. A 3-oz serving of meat is about the same size as a deck of cards. One egg equals 1 oz.  ? 2 servings of low-fat dairy each day.  ? A serving of nuts, seeds, or beans 5 times each week.  ? Heart-healthy fats. Healthy fats called Omega-3 fatty acids are found in foods such as flaxseeds and coldwater fish, like sardines, salmon, and mackerel.  · Limit how much you eat of the following:  ? Canned or prepackaged foods.  ? Food that is high in trans fat, such as fried foods.  ? Food that is high in saturated fat, such as fatty meat.  ? Sweets, desserts, sugary drinks, and other foods with added sugar.  ? Full-fat dairy products.  · Do not salt foods before eating.  · Try to eat at least 2 vegetarian meals each week.  · Eat more home-cooked food and less restaurant, buffet, and fast food.  · When eating at a restaurant, ask that your food be prepared with less salt or no salt, if possible.  What foods are recommended?  The items listed may not be a complete list. Talk with your dietitian about what dietary choices are best for you.  Grains  Whole-grain or whole-wheat bread. Whole-grain or whole-wheat pasta. Brown rice. Oatmeal. Quinoa. Bulgur. Whole-grain and low-sodium cereals. Cait bread. Low-fat, low-sodium crackers. Whole-wheat flour tortillas.  Vegetables  Fresh or frozen vegetables (raw, steamed, roasted, or grilled). Low-sodium or reduced-sodium tomato and vegetable juice. Low-sodium or reduced-sodium tomato sauce and tomato paste. Low-sodium or reduced-sodium canned vegetables.  Fruits  All fresh, dried, or frozen fruit. Canned fruit in natural juice (without  added sugar).  Meat and other protein foods  Skinless chicken or turkey. Ground chicken or turkey. Pork with fat trimmed off. Fish and seafood. Egg whites. Dried beans, peas, or lentils. Unsalted nuts, nut butters, and seeds. Unsalted canned beans. Lean cuts of beef with fat trimmed off. Low-sodium, lean deli meat.  Dairy  Low-fat (1%) or fat-free (skim) milk. Fat-free, low-fat, or reduced-fat cheeses. Nonfat, low-sodium ricotta or cottage cheese. Low-fat or nonfat yogurt. Low-fat, low-sodium cheese.  Fats and oils  Soft margarine without trans fats. Vegetable oil. Low-fat, reduced-fat, or light mayonnaise and salad dressings (reduced-sodium). Canola, safflower, olive, soybean, and sunflower oils. Avocado.  Seasoning and other foods  Herbs. Spices. Seasoning mixes without salt. Unsalted popcorn and pretzels. Fat-free sweets.  What foods are not recommended?  The items listed may not be a complete list. Talk with your dietitian about what dietary choices are best for you.  Grains  Baked goods made with fat, such as croissants, muffins, or some breads. Dry pasta or rice meal packs.  Vegetables  Creamed or fried vegetables. Vegetables in a cheese sauce. Regular canned vegetables (not low-sodium or reduced-sodium). Regular canned tomato sauce and paste (not low-sodium or reduced-sodium). Regular tomato and vegetable juice (not low-sodium or reduced-sodium). Pickles. Olives.  Fruits  Canned fruit in a light or heavy syrup. Fried fruit. Fruit in cream or butter sauce.  Meat and other protein foods  Fatty cuts of meat. Ribs. Fried meat. Schafer. Sausage. Bologna and other processed lunch meats. Salami. Fatback. Hotdogs. Bratwurst. Salted nuts and seeds. Canned beans with added salt. Canned or smoked fish. Whole eggs or egg yolks. Chicken or turkey with skin.  Dairy  Whole or 2% milk, cream, and half-and-half. Whole or full-fat cream cheese. Whole-fat or sweetened yogurt. Full-fat cheese. Nondairy creamers. Whipped toppings.  Processed cheese and cheese spreads.  Fats and oils  Butter. Stick margarine. Lard. Shortening. Ghee. Schafer fat. Tropical oils, such as coconut, palm kernel, or palm oil.  Seasoning and other foods  Salted popcorn and pretzels. Onion salt, garlic salt, seasoned salt, table salt, and sea salt. Worcestershire sauce. Tartar sauce. Barbecue sauce. Teriyaki sauce. Soy sauce, including reduced-sodium. Steak sauce. Canned and packaged gravies. Fish sauce. Oyster sauce. Cocktail sauce. Horseradish that you find on the shelf. Ketchup. Mustard. Meat flavorings and tenderizers. Bouillon cubes. Hot sauce and Tabasco sauce. Premade or packaged marinades. Premade or packaged taco seasonings. Relishes. Regular salad dressings.  Where to find more information:  · National Heart, Lung, and Blood [place]: www.nhlbi.nih.gov  · American Heart Association: www.heart.org  Summary  · The DASH eating plan is a healthy eating plan that has been shown to reduce high blood pressure (hypertension). It may also reduce your risk for type 2 diabetes, heart disease, and stroke.  · With the DASH eating plan, you should limit salt (sodium) intake to 2,300 mg a day. If you have hypertension, you may need to reduce your sodium intake to 1,500 mg a day.  · When on the DASH eating plan, aim to eat more fresh fruits and vegetables, whole grains, lean proteins, low-fat dairy, and heart-healthy fats.  · Work with your health care provider or diet and nutrition specialist (dietitian) to adjust your eating plan to your individual calorie needs.  This information is not intended to replace advice given to you by your health care provider. Make sure you discuss any questions you have with your health care provider.  Document Released: 12/06/2012 Document Revised: 12/11/2017 Document Reviewed: 12/11/2017  [employer] Interactive Patient Education © 2018 [employer]

## 2019-04-30 ENCOUNTER — OFFICE VISIT (OUTPATIENT)
Dept: CARDIOLOGY | Facility: CLINIC | Age: 69
End: 2019-04-30

## 2019-04-30 VITALS
DIASTOLIC BLOOD PRESSURE: 90 MMHG | HEART RATE: 68 BPM | SYSTOLIC BLOOD PRESSURE: 156 MMHG | HEIGHT: 71 IN | WEIGHT: 269.4 LBS | BODY MASS INDEX: 37.72 KG/M2

## 2019-04-30 DIAGNOSIS — E78.00 HYPERCHOLESTEREMIA: ICD-10-CM

## 2019-04-30 DIAGNOSIS — Z79.899 LONG TERM CURRENT USE OF ANTIARRHYTHMIC MEDICAL THERAPY: ICD-10-CM

## 2019-04-30 DIAGNOSIS — E66.09 CLASS 2 OBESITY DUE TO EXCESS CALORIES WITHOUT SERIOUS COMORBIDITY WITH BODY MASS INDEX (BMI) OF 37.0 TO 37.9 IN ADULT: ICD-10-CM

## 2019-04-30 DIAGNOSIS — I10 ESSENTIAL HYPERTENSION: ICD-10-CM

## 2019-04-30 DIAGNOSIS — R94.31 LEFT AXIS DEVIATION: ICD-10-CM

## 2019-04-30 DIAGNOSIS — I48.0 PAROXYSMAL ATRIAL FIBRILLATION (HCC): Primary | ICD-10-CM

## 2019-04-30 PROCEDURE — 93000 ELECTROCARDIOGRAM COMPLETE: CPT | Performed by: NURSE PRACTITIONER

## 2019-04-30 PROCEDURE — 99214 OFFICE O/P EST MOD 30 MIN: CPT | Performed by: NURSE PRACTITIONER

## 2019-04-30 RX ORDER — FLECAINIDE ACETATE 50 MG/1
TABLET ORAL
Qty: 90 TABLET | Refills: 3 | Status: SHIPPED | OUTPATIENT
Start: 2019-04-30 | End: 2020-06-29

## 2019-04-30 NOTE — PROGRESS NOTES
Chief Complaint   Patient presents with   • Follow-up     Cardiac Management   • Aspirin     BID   • Med Refill     needs Felcainide       Subjective       Orlando Wylie is a 68 y.o. male  with HTN, hyperlipidemia, and PAF diagnosed in 2012 when he underwent cardioversion. In September 2016, he saw Dr. Law to establish cardiac care. He had seen another cardiologist prior, had workup and was told everything was normal. His main concern was being on amiodarone for over 3 years. Since his LV function was normal and no ischemia was found, it was decided to stop amiodarone and flecainide was started. EKG has remained sinus. He has been anticoagulated with aspirin since he has maintained sinus.    Today he comes to the office for a follow-up visit.  He admits to a few episodes of dizziness about a month ago that occurred while working outside on his yard for the first time since winter.  Apparently he had some prolonged stooping and bending over and experienced dizziness and lightheadedness when standing.  He is unaware of any palpitations at that time.  He has not had chest pain or worsening shortness of breath.  No recent medication changes are noted.  Recently his blood pressure has been increased around 160/90.    HPI     Cardiac History:    Past Surgical History:   Procedure Laterality Date   • CARDIOVASCULAR STRESS TEST  09/01/2015    [email or web address]- Neagtive.   • ECHO - CONVERTED  09/01/2015    @[email or web address]. Dr. Parker- EF 65%. Mild MR and AI   • TRANSESOPHAGEAL ECHOCARDIOGRAM (SANDRITA) AND CARDIOVERSION  11/05/2012    Dr. MEDELLIN       Current Outpatient Medications   Medication Sig Dispense Refill   • amLODIPine (NORVASC) 5 MG tablet Take 5 mg by mouth Daily.     • aspirin 81 MG EC tablet Take 81 mg by mouth 2 (Two) Times a Day.     • doxazosin (CARDURA) 4 MG tablet Take 4 mg by mouth every night.     • flecainide (TAMBOCOR) 50 MG tablet TAKES 1/2 TABLET BY MOUTH TWO TIMES A DAY 90 tablet 3   •  losartan-hydrochlorothiazide (HYZAAR) 50-12.5 MG per tablet Take 1 tablet by mouth Daily. Increase to 2 tablets a day     • metoprolol succinate XL (TOPROL-XL) 25 MG 24 hr tablet Take 25 mg by mouth daily.     • Multiple Vitamins-Minerals (MULTIVITAMIN ADULT PO) Take  by mouth daily.     • rosuvastatin (CRESTOR) 10 MG tablet Take 10 mg by mouth daily.       No current facility-administered medications for this visit.        Patient has no known allergies.    Past Medical History:   Diagnosis Date   • BPH (benign prostatic hyperplasia)    • Diverticulosis    • Exogenous obesity    • H/O prostate biopsy 06/29/2010    negative findings   • History of colon polyps    • History of knee replacement    • Hyperlipidemia    • Hypertension    • PAF (paroxysmal atrial fibrillation) (CMS/HCC)    • Varicose veins        Social History     Socioeconomic History   • Marital status:      Spouse name: Not on file   • Number of children: Not on file   • Years of education: Not on file   • Highest education level: Not on file   Tobacco Use   • Smoking status: Never Smoker   • Smokeless tobacco: Never Used   Substance and Sexual Activity   • Alcohol use: Yes     Comment: occasional beer   • Drug use: No       Family History   Problem Relation Age of Onset   • Atrial fibrillation Mother    • No Known Problems Father    • Hypertension Brother    • No Known Problems Daughter    • No Known Problems Son        Review of Systems   Constitution: Negative for decreased appetite and weakness.   HENT: Negative for congestion, hoarse voice and nosebleeds.    Eyes: Negative for redness and visual disturbance.   Cardiovascular: Negative for chest pain, dyspnea on exertion, near-syncope and palpitations.   Respiratory: Positive for snoring. Negative for cough, shortness of breath and sleep disturbances due to breathing.    Endocrine: Negative for polydipsia, polyphagia and polyuria.   Hematologic/Lymphatic: Negative for bleeding problem. Does  "not bruise/bleed easily.   Skin: Negative for dry skin and itching.   Musculoskeletal: Positive for back pain and joint pain.   Gastrointestinal: Negative for abdominal pain, dysphagia, heartburn, melena and nausea.   Genitourinary: Negative for dysuria and hematuria.   Neurological: Positive for dizziness and light-headedness. Negative for difficulty with concentration, excessive daytime sleepiness and headaches.   Psychiatric/Behavioral: Negative for memory loss. The patient does not have insomnia and is not nervous/anxious.    Allergic/Immunologic: Negative for hives and persistent infections.        Objective     /90 (BP Location: Right arm)   Pulse 68   Ht 180.3 cm (70.98\")   Wt 122 kg (269 lb 6.4 oz)   BMI 37.59 kg/m²     Physical Exam   Constitutional: He is oriented to person, place, and time. Vital signs are normal. He appears well-nourished.   HENT:   Head: Normocephalic.   Eyes: Conjunctivae are normal. Pupils are equal, round, and reactive to light.   Neck: Normal range of motion. Neck supple. No JVD present. Carotid bruit is not present.   Cardiovascular: Normal rate, regular rhythm and S1 normal.   No murmur heard.  Pulses:       Radial pulses are 2+ on the right side, and 2+ on the left side.   Slightly loud S2   Pulmonary/Chest: Breath sounds normal. He has no wheezes. He has no rales.   Abdominal: Soft. Bowel sounds are normal. He exhibits no distension. There is no tenderness.   Musculoskeletal: Normal range of motion. He exhibits no edema.   Neurological: He is alert and oriented to person, place, and time.   Skin: Skin is warm and dry. No pallor.   Psychiatric: He has a normal mood and affect. His behavior is normal.          ECG 12 Lead  Date/Time: 4/30/2019 7:47 AM  Performed by: Madonna Horner APRN  Authorized by: Madonna Horner APRN   Comparison: compared with previous ECG from 10/30/2018  Comparison to previous ECG: Sinus lea, normal QTc  Rhythm: sinus rhythm  Rate: " normal  BPM: 68  QRS axis: left  Comments:  ms, QTc 457 ms                Assessment/Plan      Orlando was seen today for follow-up, aspirin and med refill.    Diagnoses and all orders for this visit:    Paroxysmal atrial fibrillation (CMS/HCC)  -     ECG 12 Lead  -     flecainide (TAMBOCOR) 50 MG tablet; TAKES 1/2 TABLET BY MOUTH TWO TIMES A DAY    Essential hypertension    Hypercholesteremia    Left axis deviation    Long term current use of antiarrhythmic medical therapy  -     ECG 12 Lead  -     flecainide (TAMBOCOR) 50 MG tablet; TAKES 1/2 TABLET BY MOUTH TWO TIMES A DAY    Class 2 obesity due to excess calories without serious comorbidity with body mass index (BMI) of 37.0 to 37.9 in adult      EKG for management of PAF and Tambocor today shows sinus rhythm with normal PA and QTc intervals.  The same dose of flecainide advised and refills given. CHADsVASc score 2.  Since he has remained sinus rhythm he is being managed with aspirin therapy only.  He does understand to contact the office immediately should he develop any irregular heart rhythms or palpitations.    His blood pressure today is increased and recently has been elevated when checked at other medical visits.  He agrees to increase Hyzaar 50/12 0.5 to 2 tablets daily.  A blood pressure monitoring she given to him and he will return to the office in 2 weeks for a blood pressure check.  Information on hypertension and DASH diet also given to him.  Same dose of metoprolol and Norvasc advised.    Patient's Body mass index is 37.59 kg/m². BMI is above normal parameters. Recommendations include: nutrition counseling.  Diet for weight loss also encouraged.  He does admit to remaining active although exercise is limited due to knee problems.    For hypercholesterolemia he is taking Crestor without issues noted.  He is planning to have labs drawn this morning.  Please forward a copy of lab results as available.    If dizziness reoccurs or other problems  develop he understands to contact the office and repeat echocardiogram and stress test will be considered.  Otherwise, we will see him back in 6 months.           Electronically signed by FARRAH Ryan,  April 30, 2019 8:52 AM

## 2019-04-30 NOTE — PATIENT INSTRUCTIONS
"DASH Eating Plan  DASH stands for \"Dietary Approaches to Stop Hypertension.\" The DASH eating plan is a healthy eating plan that has been shown to reduce high blood pressure (hypertension). It may also reduce your risk for type 2 diabetes, heart disease, and stroke. The DASH eating plan may also help with weight loss.  What are tips for following this plan?  General guidelines  · Avoid eating more than 2,300 mg (milligrams) of salt (sodium) a day. If you have hypertension, you may need to reduce your sodium intake to 1,500 mg a day.  · Limit alcohol intake to no more than 1 drink a day for nonpregnant women and 2 drinks a day for men. One drink equals 12 oz of beer, 5 oz of wine, or 1½ oz of hard liquor.  · Work with your health care provider to maintain a healthy body weight or to lose weight. Ask what an ideal weight is for you.  · Get at least 30 minutes of exercise that causes your heart to beat faster (aerobic exercise) most days of the week. Activities may include walking, swimming, or biking.  · Work with your health care provider or diet and nutrition specialist (dietitian) to adjust your eating plan to your individual calorie needs.  Reading food labels  · Check food labels for the amount of sodium per serving. Choose foods with less than 5 percent of the Daily Value of sodium. Generally, foods with less than 300 mg of sodium per serving fit into this eating plan.  · To find whole grains, look for the word \"whole\" as the first word in the ingredient list.  Shopping  · Buy products labeled as \"low-sodium\" or \"no salt added.\"  · Buy fresh foods. Avoid canned foods and premade or frozen meals.  Cooking  · Avoid adding salt when cooking. Use salt-free seasonings or herbs instead of table salt or sea salt. Check with your health care provider or pharmacist before using salt substitutes.  · Do not cyr foods. Cook foods using healthy methods such as baking, boiling, grilling, and broiling instead.  · Cook with " heart-healthy oils, such as olive, canola, soybean, or sunflower oil.  Meal planning    · Eat a balanced diet that includes:  ? 5 or more servings of fruits and vegetables each day. At each meal, try to fill half of your plate with fruits and vegetables.  ? Up to 6-8 servings of whole grains each day.  ? Less than 6 oz of lean meat, poultry, or fish each day. A 3-oz serving of meat is about the same size as a deck of cards. One egg equals 1 oz.  ? 2 servings of low-fat dairy each day.  ? A serving of nuts, seeds, or beans 5 times each week.  ? Heart-healthy fats. Healthy fats called Omega-3 fatty acids are found in foods such as flaxseeds and coldwater fish, like sardines, salmon, and mackerel.  · Limit how much you eat of the following:  ? Canned or prepackaged foods.  ? Food that is high in trans fat, such as fried foods.  ? Food that is high in saturated fat, such as fatty meat.  ? Sweets, desserts, sugary drinks, and other foods with added sugar.  ? Full-fat dairy products.  · Do not salt foods before eating.  · Try to eat at least 2 vegetarian meals each week.  · Eat more home-cooked food and less restaurant, buffet, and fast food.  · When eating at a restaurant, ask that your food be prepared with less salt or no salt, if possible.  What foods are recommended?  The items listed may not be a complete list. Talk with your dietitian about what dietary choices are best for you.  Grains  Whole-grain or whole-wheat bread. Whole-grain or whole-wheat pasta. Brown rice. Oatmeal. Quinoa. Bulgur. Whole-grain and low-sodium cereals. Cait bread. Low-fat, low-sodium crackers. Whole-wheat flour tortillas.  Vegetables  Fresh or frozen vegetables (raw, steamed, roasted, or grilled). Low-sodium or reduced-sodium tomato and vegetable juice. Low-sodium or reduced-sodium tomato sauce and tomato paste. Low-sodium or reduced-sodium canned vegetables.  Fruits  All fresh, dried, or frozen fruit. Canned fruit in natural juice (without  added sugar).  Meat and other protein foods  Skinless chicken or turkey. Ground chicken or turkey. Pork with fat trimmed off. Fish and seafood. Egg whites. Dried beans, peas, or lentils. Unsalted nuts, nut butters, and seeds. Unsalted canned beans. Lean cuts of beef with fat trimmed off. Low-sodium, lean deli meat.  Dairy  Low-fat (1%) or fat-free (skim) milk. Fat-free, low-fat, or reduced-fat cheeses. Nonfat, low-sodium ricotta or cottage cheese. Low-fat or nonfat yogurt. Low-fat, low-sodium cheese.  Fats and oils  Soft margarine without trans fats. Vegetable oil. Low-fat, reduced-fat, or light mayonnaise and salad dressings (reduced-sodium). Canola, safflower, olive, soybean, and sunflower oils. Avocado.  Seasoning and other foods  Herbs. Spices. Seasoning mixes without salt. Unsalted popcorn and pretzels. Fat-free sweets.  What foods are not recommended?  The items listed may not be a complete list. Talk with your dietitian about what dietary choices are best for you.  Grains  Baked goods made with fat, such as croissants, muffins, or some breads. Dry pasta or rice meal packs.  Vegetables  Creamed or fried vegetables. Vegetables in a cheese sauce. Regular canned vegetables (not low-sodium or reduced-sodium). Regular canned tomato sauce and paste (not low-sodium or reduced-sodium). Regular tomato and vegetable juice (not low-sodium or reduced-sodium). Pickles. Olives.  Fruits  Canned fruit in a light or heavy syrup. Fried fruit. Fruit in cream or butter sauce.  Meat and other protein foods  Fatty cuts of meat. Ribs. Fried meat. Schafer. Sausage. Bologna and other processed lunch meats. Salami. Fatback. Hotdogs. Bratwurst. Salted nuts and seeds. Canned beans with added salt. Canned or smoked fish. Whole eggs or egg yolks. Chicken or turkey with skin.  Dairy  Whole or 2% milk, cream, and half-and-half. Whole or full-fat cream cheese. Whole-fat or sweetened yogurt. Full-fat cheese. Nondairy creamers. Whipped toppings.  Processed cheese and cheese spreads.  Fats and oils  Butter. Stick margarine. Lard. Shortening. Ghee. Schafer fat. Tropical oils, such as coconut, palm kernel, or palm oil.  Seasoning and other foods  Salted popcorn and pretzels. Onion salt, garlic salt, seasoned salt, table salt, and sea salt. Worcestershire sauce. Tartar sauce. Barbecue sauce. Teriyaki sauce. Soy sauce, including reduced-sodium. Steak sauce. Canned and packaged gravies. Fish sauce. Oyster sauce. Cocktail sauce. Horseradish that you find on the shelf. Ketchup. Mustard. Meat flavorings and tenderizers. Bouillon cubes. Hot sauce and Tabasco sauce. Premade or packaged marinades. Premade or packaged taco seasonings. Relishes. Regular salad dressings.  Where to find more information:  · National Heart, [employer], and [facility] [place]: www.nhlbi.nih.gov  · American Heart Association: www.heart.org  Summary  · The DASH eating plan is a healthy eating plan that has been shown to reduce high blood pressure (hypertension). It may also reduce your risk for type 2 diabetes, heart disease, and stroke.  · With the DASH eating plan, you should limit salt (sodium) intake to 2,300 mg a day. If you have hypertension, you may need to reduce your sodium intake to 1,500 mg a day.  · When on the DASH eating plan, aim to eat more fresh fruits and vegetables, whole grains, lean proteins, low-fat dairy, and heart-healthy fats.  · Work with your health care provider or diet and nutrition specialist (dietitian) to adjust your eating plan to your individual calorie needs.  This information is not intended to replace advice given to you by your health care provider. Make sure you discuss any questions you have with your health care provider.  Document Released: 12/06/2012 Document Revised: 12/11/2017 Document Reviewed: 12/11/2017  [employer] Interactive Patient Education © 2019 [employer]  Hypertension  Hypertension, commonly called high blood pressure, is when the force of blood  "pumping through the arteries is too strong. The arteries are the blood vessels that carry blood from the heart throughout the body. Hypertension forces the heart to work harder to pump blood and may cause arteries to become narrow or stiff. Having untreated or uncontrolled hypertension can cause heart attacks, strokes, kidney disease, and other problems.  A blood pressure reading consists of a higher number over a lower number. Ideally, your blood pressure should be below 120/80. The first (\"top\") number is called the systolic pressure. It is a measure of the pressure in your arteries as your heart beats. The second (\"bottom\") number is called the diastolic pressure. It is a measure of the pressure in your arteries as the heart relaxes.  What are the causes?  The cause of this condition is not known.  What increases the risk?  Some risk factors for high blood pressure are under your control. Others are not.  Factors you can change  · Smoking.  · Having type 2 diabetes mellitus, high cholesterol, or both.  · Not getting enough exercise or physical activity.  · Being overweight.  · Having too much fat, sugar, calories, or salt (sodium) in your diet.  · Drinking too much alcohol.  Factors that are difficult or impossible to change  · Having chronic kidney disease.  · Having a family history of high blood pressure.  · Age. Risk increases with age.  · Race. You may be at higher risk if you are -American.  · Gender. Men are at higher risk than women before age 45. After age 65, women are at higher risk than men.  · Having obstructive sleep apnea.  · Stress.  What are the signs or symptoms?  Extremely high blood pressure (hypertensive crisis) may cause:  · Headache.  · Anxiety.  · Shortness of breath.  · Nosebleed.  · Nausea and vomiting.  · Severe chest pain.  · Jerky movements you cannot control (seizures).    How is this diagnosed?  This condition is diagnosed by measuring your blood pressure while you are " seated, with your arm resting on a surface. The cuff of the blood pressure monitor will be placed directly against the skin of your upper arm at the level of your heart. It should be measured at least twice using the same arm. Certain conditions can cause a difference in blood pressure between your right and left arms.  Certain factors can cause blood pressure readings to be lower or higher than normal (elevated) for a short period of time:  · When your blood pressure is higher when you are in a health care provider's office than when you are at home, this is called white coat hypertension. Most people with this condition do not need medicines.  · When your blood pressure is higher at home than when you are in a health care provider's office, this is called masked hypertension. Most people with this condition may need medicines to control blood pressure.    If you have a high blood pressure reading during one visit or you have normal blood pressure with other risk factors:  · You may be asked to return on a different day to have your blood pressure checked again.  · You may be asked to monitor your blood pressure at home for 1 week or longer.    If you are diagnosed with hypertension, you may have other blood or imaging tests to help your health care provider understand your overall risk for other conditions.  How is this treated?  This condition is treated by making healthy lifestyle changes, such as eating healthy foods, exercising more, and reducing your alcohol intake. Your health care provider may prescribe medicine if lifestyle changes are not enough to get your blood pressure under control, and if:  · Your systolic blood pressure is above 130.  · Your diastolic blood pressure is above 80.    Your personal target blood pressure may vary depending on your medical conditions, your age, and other factors.  Follow these instructions at home:  Eating and drinking  · Eat a diet that is high in fiber and potassium,  and low in sodium, added sugar, and fat. An example eating plan is called the DASH (Dietary Approaches to Stop Hypertension) diet. To eat this way:  ? Eat plenty of fresh fruits and vegetables. Try to fill half of your plate at each meal with fruits and vegetables.  ? Eat whole grains, such as whole wheat pasta, brown rice, or whole grain bread. Fill about one quarter of your plate with whole grains.  ? Eat or drink low-fat dairy products, such as skim milk or low-fat yogurt.  ? Avoid fatty cuts of meat, processed or cured meats, and poultry with skin. Fill about one quarter of your plate with lean proteins, such as fish, chicken without skin, beans, eggs, and tofu.  ? Avoid premade and processed foods. These tend to be higher in sodium, added sugar, and fat.  · Reduce your daily sodium intake. Most people with hypertension should eat less than 1,500 mg of sodium a day.  · Limit alcohol intake to no more than 1 drink a day for nonpregnant women and 2 drinks a day for men. One drink equals 12 oz of beer, 5 oz of wine, or 1½ oz of hard liquor.  Lifestyle  · Work with your health care provider to maintain a healthy body weight or to lose weight. Ask what an ideal weight is for you.  · Get at least 30 minutes of exercise that causes your heart to beat faster (aerobic exercise) most days of the week. Activities may include walking, swimming, or biking.  · Include exercise to strengthen your muscles (resistance exercise), such as pilates or lifting weights, as part of your weekly exercise routine. Try to do these types of exercises for 30 minutes at least 3 days a week.  · Do not use any products that contain nicotine or tobacco, such as cigarettes and e-cigarettes. If you need help quitting, ask your health care provider.  · Monitor your blood pressure at home as told by your health care provider.  · Keep all follow-up visits as told by your health care provider. This is important.  Medicines  · Take over-the-counter and  prescription medicines only as told by your health care provider. Follow directions carefully. Blood pressure medicines must be taken as prescribed.  · Do not skip doses of blood pressure medicine. Doing this puts you at risk for problems and can make the medicine less effective.  · Ask your health care provider about side effects or reactions to medicines that you should watch for.  Contact a health care provider if:  · You think you are having a reaction to a medicine you are taking.  · You have headaches that keep coming back (recurring).  · You feel dizzy.  · You have swelling in your ankles.  · You have trouble with your vision.  Get help right away if:  · You develop a severe headache or confusion.  · You have unusual weakness or numbness.  · You feel faint.  · You have severe pain in your chest or abdomen.  · You vomit repeatedly.  · You have trouble breathing.  Summary  · Hypertension is when the force of blood pumping through your arteries is too strong. If this condition is not controlled, it may put you at risk for serious complications.  · Your personal target blood pressure may vary depending on your medical conditions, your age, and other factors. For most people, a normal blood pressure is less than 120/80.  · Hypertension is treated with lifestyle changes, medicines, or a combination of both. Lifestyle changes include weight loss, eating a healthy, low-sodium diet, exercising more, and limiting alcohol.  This information is not intended to replace advice given to you by your health care provider. Make sure you discuss any questions you have with your health care provider.  Document Released: 12/18/2006 Document Revised: 11/15/2017 Document Reviewed: 11/15/2017  [employer] Interactive Patient Education © 2019 [employer]

## 2019-05-20 ENCOUNTER — TELEPHONE (OUTPATIENT)
Dept: CARDIOLOGY | Facility: CLINIC | Age: 69
End: 2019-05-20

## 2019-05-20 ENCOUNTER — CLINICAL SUPPORT (OUTPATIENT)
Dept: CARDIOLOGY | Facility: CLINIC | Age: 69
End: 2019-05-20

## 2019-05-20 VITALS — SYSTOLIC BLOOD PRESSURE: 164 MMHG | HEART RATE: 84 BPM | DIASTOLIC BLOOD PRESSURE: 98 MMHG

## 2019-05-20 RX ORDER — AMLODIPINE BESYLATE 5 MG/1
5 TABLET ORAL DAILY
Status: CANCELLED | OUTPATIENT
Start: 2019-05-20

## 2019-05-23 ENCOUNTER — CLINICAL SUPPORT (OUTPATIENT)
Dept: CARDIOLOGY | Facility: CLINIC | Age: 69
End: 2019-05-23

## 2019-05-23 VITALS — HEART RATE: 76 BPM | SYSTOLIC BLOOD PRESSURE: 130 MMHG | DIASTOLIC BLOOD PRESSURE: 82 MMHG

## 2019-05-23 DIAGNOSIS — Z01.30 BLOOD PRESSURE CHECK: Primary | ICD-10-CM

## 2019-05-23 RX ORDER — LOSARTAN POTASSIUM AND HYDROCHLOROTHIAZIDE 25; 100 MG/1; MG/1
1 TABLET ORAL DAILY
Qty: 90 TABLET | Refills: 3 | Status: SHIPPED | OUTPATIENT
Start: 2019-05-23 | End: 2020-05-18

## 2019-06-03 ENCOUNTER — TELEPHONE (OUTPATIENT)
Dept: CARDIOLOGY | Facility: CLINIC | Age: 69
End: 2019-06-03

## 2019-06-03 NOTE — TELEPHONE ENCOUNTER
Received fax from [facility] for cardiac clearance for patient to have knee replacement. Patient is on aspirin. According to our records, I do not see where patient has had any stenting.       Fax [phone]

## 2019-10-31 ENCOUNTER — OFFICE VISIT (OUTPATIENT)
Dept: CARDIOLOGY | Facility: CLINIC | Age: 69
End: 2019-10-31

## 2019-10-31 VITALS
HEART RATE: 68 BPM | HEIGHT: 71 IN | BODY MASS INDEX: 37.85 KG/M2 | DIASTOLIC BLOOD PRESSURE: 70 MMHG | WEIGHT: 270.4 LBS | SYSTOLIC BLOOD PRESSURE: 110 MMHG

## 2019-10-31 DIAGNOSIS — M25.562 CHRONIC PAIN OF LEFT KNEE: ICD-10-CM

## 2019-10-31 DIAGNOSIS — Z79.899 LONG TERM CURRENT USE OF ANTIARRHYTHMIC MEDICAL THERAPY: ICD-10-CM

## 2019-10-31 DIAGNOSIS — I10 ESSENTIAL HYPERTENSION: ICD-10-CM

## 2019-10-31 DIAGNOSIS — I49.1 PAC (PREMATURE ATRIAL CONTRACTION): ICD-10-CM

## 2019-10-31 DIAGNOSIS — E66.09 CLASS 2 OBESITY DUE TO EXCESS CALORIES WITHOUT SERIOUS COMORBIDITY WITH BODY MASS INDEX (BMI) OF 37.0 TO 37.9 IN ADULT: ICD-10-CM

## 2019-10-31 DIAGNOSIS — I45.4 BBB (BUNDLE BRANCH BLOCK): ICD-10-CM

## 2019-10-31 DIAGNOSIS — E78.00 HYPERCHOLESTEREMIA: ICD-10-CM

## 2019-10-31 DIAGNOSIS — I48.0 PAF (PAROXYSMAL ATRIAL FIBRILLATION) (HCC): Primary | ICD-10-CM

## 2019-10-31 DIAGNOSIS — G89.29 CHRONIC PAIN OF LEFT KNEE: ICD-10-CM

## 2019-10-31 PROCEDURE — 93000 ELECTROCARDIOGRAM COMPLETE: CPT | Performed by: NURSE PRACTITIONER

## 2019-10-31 PROCEDURE — 99214 OFFICE O/P EST MOD 30 MIN: CPT | Performed by: NURSE PRACTITIONER

## 2020-05-18 RX ORDER — LOSARTAN POTASSIUM AND HYDROCHLOROTHIAZIDE 25; 100 MG/1; MG/1
TABLET ORAL
Qty: 90 TABLET | Refills: 1 | Status: SHIPPED | OUTPATIENT
Start: 2020-05-18 | End: 2020-11-12

## 2020-06-27 DIAGNOSIS — Z79.899 LONG TERM CURRENT USE OF ANTIARRHYTHMIC MEDICAL THERAPY: ICD-10-CM

## 2020-06-27 DIAGNOSIS — I48.0 PAROXYSMAL ATRIAL FIBRILLATION (HCC): ICD-10-CM

## 2020-06-29 RX ORDER — FLECAINIDE ACETATE 50 MG/1
TABLET ORAL
Qty: 90 TABLET | Refills: 2 | Status: SHIPPED | OUTPATIENT
Start: 2020-06-29 | End: 2020-08-18

## 2020-08-18 ENCOUNTER — OFFICE VISIT (OUTPATIENT)
Dept: CARDIOLOGY | Facility: CLINIC | Age: 70
End: 2020-08-18

## 2020-08-18 VITALS
DIASTOLIC BLOOD PRESSURE: 78 MMHG | BODY MASS INDEX: 36.03 KG/M2 | SYSTOLIC BLOOD PRESSURE: 138 MMHG | WEIGHT: 257.4 LBS | TEMPERATURE: 98.3 F | HEIGHT: 71 IN | HEART RATE: 83 BPM

## 2020-08-18 DIAGNOSIS — E78.00 HYPERCHOLESTEREMIA: ICD-10-CM

## 2020-08-18 DIAGNOSIS — I48.0 PAROXYSMAL ATRIAL FIBRILLATION (HCC): ICD-10-CM

## 2020-08-18 DIAGNOSIS — R94.31 LEFT AXIS DEVIATION: ICD-10-CM

## 2020-08-18 DIAGNOSIS — R06.02 SHORTNESS OF BREATH: ICD-10-CM

## 2020-08-18 DIAGNOSIS — E66.09 CLASS 2 OBESITY DUE TO EXCESS CALORIES WITHOUT SERIOUS COMORBIDITY WITH BODY MASS INDEX (BMI) OF 37.0 TO 37.9 IN ADULT: ICD-10-CM

## 2020-08-18 DIAGNOSIS — I10 ESSENTIAL HYPERTENSION: ICD-10-CM

## 2020-08-18 DIAGNOSIS — Z79.899 LONG TERM CURRENT USE OF ANTIARRHYTHMIC MEDICAL THERAPY: ICD-10-CM

## 2020-08-18 DIAGNOSIS — R42 EPISODIC LIGHTHEADEDNESS: ICD-10-CM

## 2020-08-18 PROCEDURE — 93000 ELECTROCARDIOGRAM COMPLETE: CPT | Performed by: NURSE PRACTITIONER

## 2020-08-18 PROCEDURE — 99214 OFFICE O/P EST MOD 30 MIN: CPT | Performed by: NURSE PRACTITIONER

## 2020-08-18 RX ORDER — FLECAINIDE ACETATE 50 MG/1
50 TABLET ORAL 2 TIMES DAILY
Qty: 60 TABLET | Refills: 11
Start: 2020-08-18 | End: 2020-11-19 | Stop reason: ALTCHOICE

## 2020-08-18 NOTE — PATIENT INSTRUCTIONS
Atrial Fibrillation  Atrial fibrillation is a type of irregular or rapid heartbeat (arrhythmia). In atrial fibrillation, the top part of the heart (atria) quivers in a chaotic pattern. This makes the heart unable to pump blood normally. Having atrial fibrillation can increase your risk for other health problems, such as:  · Blood can pool in the atria and form clots. If a clot travels to the brain, it can cause a stroke.  · The heart muscle may weaken from the irregular blood flow. This can cause heart failure.  Atrial fibrillation may start suddenly and stop on its own, or it may become a long-lasting problem.  What are the causes?  This condition is caused by some heart-related conditions or procedures, including:  · High blood pressure. This is the most common cause.  · Heart failure.  · Heart valve conditions.  · Inflammation of the sac that surrounds the heart (pericarditis).  · Heart surgery.  · Coronary artery disease.  · Certain heart rhythm disorders, such as Pro-Parkinson-White syndrome.  Other causes include:  · Pneumonia.  · Obstructive sleep apnea.  · Lung cancer.  · Thyroid problems, especially if the thyroid is overactive (hyperthyroidism).  · Excessive alcohol or drug use.  Sometimes, the cause of this condition is not known.  What increases the risk?  This condition is more likely to develop in:  · Older people.  · People who smoke.  · People who have diabetes mellitus.  · People who are overweight (obese).  · Athletes who exercise vigorously.  · People who have a family history.  What are the signs or symptoms?  Symptoms of this condition include:  · A feeling that your heart is beating rapidly or irregularly.  · A feeling of discomfort or pain in your chest.  · Shortness of breath.  · Sudden light-headedness or weakness.  · Getting tired easily during exercise.  In some cases, there are no symptoms.  How is this diagnosed?  Your health care provider may be able to detect atrial fibrillation when  taking your pulse. If detected, this condition may be diagnosed with:  · Electrocardiogram (ECG).  · Ambulatory cardiac monitor. This device records your heartbeats for 24 hours or more.  · Transthoracic echocardiogram (TTE) to evaluate how blood flows through your heart.  · Transesophageal echocardiogram (SANDRITA) to view more detailed images of your heart.  · A stress test.  · Imaging tests, such as a CT scan or chest X-ray.  · Blood tests.  How is this treated?  This condition may be treated with:  · Medicines to slow down the heart rate or bring the heart's rhythm back to normal.  · Medicines to prevent blood clots from forming.  · Electrical cardioversion. This delivers a low-energy shock to the heart to reset its rhythm.  · Ablation. This procedure destroys the part of the heart tissue that sends abnormal signals.  · Left atrial appendage occlusion/excision. This seals off a common place in the atria where blood clots can form (left atrial appendage).  The goal of treatment is to prevent blood clots from forming and to keep your heart beating at a normal rate and rhythm. Treatment depends on underlying medical conditions and how you feel when you are experiencing fibrillation.  Follow these instructions at home:  Medicines  · Take over-the counter and prescription medicines only as told by your health care provider.  · If your health care provider prescribed a blood-thinning medicine (anticoagulant), take it exactly as told. Taking too much blood-thinning medicine can cause bleeding. Taking too little can enable a blood clot to form and travel to the brain, causing a stroke.  Lifestyle         · Do not use any products that contain nicotine or tobacco, such as cigarettes and e-cigarettes. If you need help quitting, ask your health care provider.  · Do not drink beverages that contain caffeine, such as coffee, soda, and tea.  · Follow diet instructions as told by your health care provider.  · Exercise regularly as  "told by your health care provider.  · Do not drink alcohol.  General instructions  · If you have obstructive sleep apnea, manage your condition as told by your health care provider.  · Maintain a healthy weight. Do not use diet pills unless your health care provider approves. Diet pills may make heart problems worse.  · Keep all follow-up visits as told by your health care provider. This is important.  Contact a health care provider if you:  · Notice a change in the rate, rhythm, or strength of your heartbeat.  · Are taking an anticoagulant and you notice increased bruising.  · Tire more easily when you exercise or exert yourself.  · Have a sudden change in weight.  Get help right away if you have:    · Chest pain, abdominal pain, sweating, or weakness.  · Difficulty breathing.  · Blood in your vomit, stool (feces), or urine.  · Any symptoms of a stroke. \"BE FAST\" is an easy way to remember the main warning signs of a stroke:  ? B - Balance. Signs are dizziness, sudden trouble walking, or loss of balance.  ? E - Eyes. Signs are trouble seeing or a sudden change in vision.  ? F - Face. Signs are sudden weakness or numbness of the face, or the face or eyelid drooping on one side.  ? A - Arms. Signs are weakness or numbness in an arm. This happens suddenly and usually on one side of the body.  ? S - Speech. Signs are sudden trouble speaking, slurred speech, or trouble understanding what people say.  ? T - Time. Time to call emergency services. Write down what time symptoms started.  · Other signs of a stroke, such as:  ? A sudden, severe headache with no known cause.  ? Nausea or vomiting.  ? Seizure.  These symptoms may represent a serious problem that is an emergency. Do not wait to see if the symptoms will go away. Get medical help right away. Call your local emergency services (911 in the [place] Do not drive yourself to the hospital.  Summary  · Atrial fibrillation is a type of irregular or rapid heartbeat " (arrhythmia).  · Symptoms include a feeling that your heart is beating fast or irregularly. In some cases, you may not have symptoms.  · The condition is treated with medicines to slow down the heart rate or bring the heart's rhythm back to normal. You may also need blood-thinning medicines to prevent blood clots.  · Get help right away if you have symptoms or signs of a stroke.  This information is not intended to replace advice given to you by your health care provider. Make sure you discuss any questions you have with your health care provider.  Document Released: 12/18/2006 Document Revised: 02/07/2019 Document Reviewed: 02/08/2019  [employer] Patient Education © 2020 [employer]

## 2020-08-18 NOTE — PROGRESS NOTES
Chief Complaint   Patient presents with   • Follow-up     Cardiac managment . Current labs on chart  . Has no cardiac complaints today. BP slightly elevated today but he had just taken medicine before office visit   • Med Refill     No refills needed today. Reviewed meds verbally       Subjective       Orlando Wylie is a 69 y.o. male with HTN, hyperlipidemia, and PAF diagnosed in 2012 when he underwent cardioversion. In September 2016, he saw Dr. Law to establish cardiac care. He had seen another cardiologist prior, had workup and was told everything was normal. His main concern was being on amiodarone for over 3 years. Since his LV function was normal and no ischemia was found, it was decided to stop amiodarone and flecainide was started. EKG has remained sinus. He has been anticoagulated with aspirin since he has maintained sinus.    Today he comes to the office for a follow up visit.  No episodes of chest pain noted. He admits to feeling a little lightheaded and short of breath more than normal when doing yard work last week. No palpitations noted but can sometimes tell when heart is out of rhythm.  He denies TIA type symptoms.  Blood pressure has been normal.  No recent change in cardiac medications noted.       Cardiac History:    Past Surgical History:   Procedure Laterality Date   • CARDIOVASCULAR STRESS TEST  09/01/2015    @[facility].L. Myoview- Neagtive.   • ECHO - CONVERTED  09/01/2015    @[facility]. Dr. Parker- EF 65%. Mild MR and AI   • TRANSESOPHAGEAL ECHOCARDIOGRAM (SANDRITA) AND CARDIOVERSION  11/05/2012    Dr. MEDELLIN       Current Outpatient Medications   Medication Sig Dispense Refill   • amLODIPine (NORVASC) 5 MG tablet Take 5 mg by mouth Daily.     • doxazosin (CARDURA) 4 MG tablet Take 4 mg by mouth every night.     • losartan-hydrochlorothiazide (HYZAAR) 100-25 MG per tablet TAKE ONE TABLET BY MOUTH DAILY 90 tablet 1   • metoprolol succinate XL (TOPROL-XL) 25 MG 24 hr tablet Take 25 mg by mouth daily.     •  Multiple Vitamins-Minerals (MULTIVITAMIN ADULT PO) Take  by mouth daily.     • rosuvastatin (CRESTOR) 10 MG tablet Take 10 mg by mouth daily.     • flecainide (TAMBOCOR) 50 MG tablet Take 1 tablet by mouth 2 (Two) Times a Day. 60 tablet 11   • rivaroxaban (XARELTO) 20 MG tablet Take 1 tablet by mouth Daily. 14 tablet 0     No current facility-administered medications for this visit.        Patient has no known allergies.    Past Medical History:   Diagnosis Date   • BPH (benign prostatic hyperplasia)    • Diverticulosis    • Exogenous obesity    • H/O prostate biopsy 06/29/2010    negative findings   • History of colon polyps    • History of knee replacement    • History of left knee replacement 06/25/2019   • Hyperlipidemia    • Hypertension    • PAF (paroxysmal atrial fibrillation) (CMS/HCC)    • Varicose veins        Social History     Socioeconomic History   • Marital status:      Spouse name: Not on file   • Number of children: Not on file   • Years of education: Not on file   • Highest education level: Not on file   Tobacco Use   • Smoking status: Never Smoker   • Smokeless tobacco: Never Used   Substance and Sexual Activity   • Alcohol use: Yes     Comment: occasional beer   • Drug use: No       Family History   Problem Relation Age of Onset   • Atrial fibrillation Mother    • No Known Problems Father    • Hypertension Brother    • No Known Problems Daughter    • No Known Problems Son        Review of Systems   Constitution: Negative for decreased appetite, diaphoresis and malaise/fatigue.   HENT: Negative for nosebleeds.    Eyes: Negative for blurred vision.   Cardiovascular: Negative for chest pain, claudication, cyanosis, dyspnea on exertion, irregular heartbeat, leg swelling, near-syncope, orthopnea, palpitations, paroxysmal nocturnal dyspnea and syncope.   Respiratory: Positive for shortness of breath (little worse with activity). Negative for cough.    Endocrine: Negative for cold intolerance and  "heat intolerance.   Hematologic/Lymphatic: Does not bruise/bleed easily.   Skin: Negative for rash.   Musculoskeletal: Negative for myalgias.   Gastrointestinal: Negative for heartburn, melena and nausea.   Genitourinary: Negative for dysuria and hematuria.   Neurological: Positive for light-headedness. Negative for dizziness.   Psychiatric/Behavioral: The patient does not have insomnia and is not nervous/anxious.         Objective      Labs 5/6/2020 per PCP: Glucose 100, BUN 17, creatinine 0.8, sodium 138, testing 4.7, chloride 99, CO2 33, calcium 9.8, total protein 6.9, AST 21, ALT 18, ALP 72, total bili 1.4, , total cholesterol 131, triglycerides 68, HDL 57, LDL 60, PSA 3.64    /78 (BP Location: Left arm)   Pulse 83   Temp 98.3 °F (36.8 °C)   Ht 180.3 cm (70.98\")   Wt 117 kg (257 lb 6.4 oz)   BMI 35.92 kg/m²     Physical Exam   Constitutional: He is oriented to person, place, and time. He appears well-nourished.   HENT:   Head: Normocephalic.   Eyes: Pupils are equal, round, and reactive to light. Conjunctivae are normal.   Neck: Normal range of motion. Neck supple. Carotid bruit is not present.   Cardiovascular: Normal rate, S1 normal and S2 normal. An irregular rhythm present.   Murmur heard.   Systolic murmur is present with a grade of 2/6.  Pulses:       Radial pulses are 2+ on the right side, and 2+ on the left side.   Pulmonary/Chest: Breath sounds normal. He has no wheezes. He has no rales.   Abdominal: Soft. Bowel sounds are normal. He exhibits no distension. There is no tenderness.   Musculoskeletal: Normal range of motion. He exhibits no edema or tenderness.   Neurological: He is alert and oriented to person, place, and time.   Skin: Skin is warm.   Psychiatric: He has a normal mood and affect. His behavior is normal.          ECG 12 Lead  Date/Time: 8/18/2020 7:53 AM  Performed by: Madonna Horner APRN  Authorized by: Madonna Horner APRN   Comparison: compared with previous ECG " from 10/31/2019  Comparison to previous ECG: Sinus, rare PAC, normal QTc, current EKG atrial fib with controlled ventricular rate.   Rhythm: atrial fibrillation  BPM: 83                Problem List Items Addressed This Visit        Cardiovascular and Mediastinum    Paroxysmal atrial fibrillation (CMS/HCC)    Relevant Medications    flecainide (TAMBOCOR) 50 MG tablet    rivaroxaban (XARELTO) 20 MG tablet    Other Relevant Orders    ECG 12 Lead    Stress Test With Myocardial Perfusion One Day    Adult Transthoracic Echo Complete W/ Cont if Necessary Per Protocol    Comprehensive Metabolic Panel    TSH    Magnesium    Essential hypertension    Relevant Orders    Stress Test With Myocardial Perfusion One Day    Adult Transthoracic Echo Complete W/ Cont if Necessary Per Protocol    Hypercholesteremia    Relevant Orders    Stress Test With Myocardial Perfusion One Day    Adult Transthoracic Echo Complete W/ Cont if Necessary Per Protocol       Digestive    Class 2 obesity due to excess calories without serious comorbidity with body mass index (BMI) of 37.0 to 37.9 in adult       Other    Long term current use of antiarrhythmic medical therapy    Relevant Orders    ECG 12 Lead    Comprehensive Metabolic Panel    TSH    Magnesium      Other Visit Diagnoses     Left axis deviation        Relevant Orders    ECG 12 Lead    Shortness of breath        Relevant Orders    Stress Test With Myocardial Perfusion One Day    Adult Transthoracic Echo Complete W/ Cont if Necessary Per Protocol    CBC (No Diff)    Episodic lightheadedness        Relevant Orders    Stress Test With Myocardial Perfusion One Day    Adult Transthoracic Echo Complete W/ Cont if Necessary Per Protocol         For management of PAF, EKG done today showing atrial fibrillation and left axis deviation.  Ventricular rate is controlled being 83 bpm.  Advised to 1)  Increase Tambocor to 50 mg twice a day 2) Stop aspirin, start Xarelto 20 mg daily.  Samples given. 3)   Return in 2 days for repeat EKG 4) labs ordered which can be done same day as cardiac workup.  An informational handout on atrial fibrillation was also given to him.    Due to recurrence of atrial fibrillation as well as increase shortness of breath and episodic lightheadedness repeat cardiac work-up advised.  Since he has had knee surgery and is better able to ambulate, he wants to try treadmill stress test but if he develops issues can change to Lexiscan.  Echocardiogram ordered to relook at overall LV function, PA pressure, and valvular structures as well as for any signs of blood clot.    Hypercholesterolemia-thank you for sending copy of May lab results.  Lipids are well controlled.  Liver function test are normal.  Continue statin therapy in form of Crestor.    Hypertension- blood pressure normal.  Continue same antihypertensive medication along with DASH diet.    Patient's Body mass index is 35.92 kg/m². BMI is above normal parameters. Recommendations include: nutrition counseling.  Mediterranean diet along with DASH diet encouraged.     Note: He is scheduled for routine screening colonoscopy to be done on 22 September.  Cardiac clearance will depend on cardiac work-up and need to continue Xarelto.    A 3-month follow-up visit scheduled.  Please call sooner for cardiac concerns.           Electronically signed by FARRAH Ryan,  August 18, 2020 10:16

## 2020-08-20 ENCOUNTER — CLINICAL SUPPORT (OUTPATIENT)
Dept: CARDIOLOGY | Facility: CLINIC | Age: 70
End: 2020-08-20

## 2020-08-20 ENCOUNTER — TELEPHONE (OUTPATIENT)
Dept: CARDIOLOGY | Facility: CLINIC | Age: 70
End: 2020-08-20

## 2020-08-20 DIAGNOSIS — I48.0 PAROXYSMAL ATRIAL FIBRILLATION (HCC): Primary | ICD-10-CM

## 2020-08-20 NOTE — TELEPHONE ENCOUNTER
Okay. Advise to continue same dose Flecainide. We will wait for any changes after stress test. Thank you.

## 2020-08-20 NOTE — TELEPHONE ENCOUNTER
Dr. Law,     At 8/18/ visit EKG showed reoccurrence of atrial fib. Flecainide increased to 50 mg bid. Xarelto samples given. Stress and echo ordered. EKG today shows atrial fib/flutter.  /60. He denies any significant symptoms. He has history of ECV in past.     Continue with medication management and cardiac workup with plan for ECV if remains in arrhythmia after 6 weeks of Xarelto?

## 2020-08-20 NOTE — TELEPHONE ENCOUNTER
I spoke with patient he verbalizes understanding about increasing Flecainide but states that he is leaving to go out of town Saturday and will be gone all next week.  He will return  9-2-2020 He adds that  he has enough Xarelto to last him until he  Returns from out of town.  He is scheduled to have Stress test and Echo first part of September.

## 2020-08-20 NOTE — TELEPHONE ENCOUNTER
I spoke with patients wife Mavis about  Continuing with Flecainide 50 mg BID and Xarelto until he returns from out of town  , d/t inability to have EKG done in timely manner .  And clarification on Stress test is scheduled August 31,2020 .

## 2020-08-20 NOTE — TELEPHONE ENCOUNTER
Caty,     Please inform patient to go ahead and increase Flecainide to 50 mg tid. Repeat EKG in 2 days. Continue with plan for stress and echo. If he remains in atrial fib then cardioversion will be advised 6 weeks from 8/18. Instruct to stay on Xarelto. He may need prescription.

## 2020-08-21 PROCEDURE — 93000 ELECTROCARDIOGRAM COMPLETE: CPT | Performed by: NURSE PRACTITIONER

## 2020-08-21 NOTE — PROGRESS NOTES
Procedure     ECG 12 Lead  Date/Time: 8/21/2020 8:20 AM  Performed by: Madonna Horner APRN  Authorized by: Madonna Horner APRN   Comparison: compared with previous ECG from 8/18/2020  Similar to previous ECG  Rhythm: atrial fibrillation  BPM: 83

## 2020-08-31 ENCOUNTER — LAB (OUTPATIENT)
Dept: LAB | Facility: HOSPITAL | Age: 70
End: 2020-08-31

## 2020-08-31 ENCOUNTER — HOSPITAL ENCOUNTER (OUTPATIENT)
Dept: CARDIOLOGY | Facility: HOSPITAL | Age: 70
Discharge: HOME | End: 2020-08-31

## 2020-08-31 DIAGNOSIS — E78.00 HYPERCHOLESTEREMIA: ICD-10-CM

## 2020-08-31 DIAGNOSIS — I48.0 PAROXYSMAL ATRIAL FIBRILLATION (HCC): ICD-10-CM

## 2020-08-31 DIAGNOSIS — Z79.899 LONG TERM CURRENT USE OF ANTIARRHYTHMIC MEDICAL THERAPY: ICD-10-CM

## 2020-08-31 DIAGNOSIS — R06.02 SHORTNESS OF BREATH: ICD-10-CM

## 2020-08-31 DIAGNOSIS — R42 EPISODIC LIGHTHEADEDNESS: ICD-10-CM

## 2020-08-31 DIAGNOSIS — I10 ESSENTIAL HYPERTENSION: ICD-10-CM

## 2020-08-31 LAB
ALBUMIN SERPL-MCNC: 4.26 G/DL (ref 3.5–5.2)
ALBUMIN/GLOB SERPL: 1.7 G/DL
ALP SERPL-CCNC: 88 U/L (ref 39–117)
ALT SERPL W P-5'-P-CCNC: 12 U/L (ref 1–41)
ANION GAP SERPL CALCULATED.3IONS-SCNC: 13.2 MMOL/L (ref 5–15)
AST SERPL-CCNC: 14 U/L (ref 1–40)
BH CV ECHO MEAS - ACS: 2.4 CM
BH CV ECHO MEAS - AI DEC SLOPE: 366 CM/SEC^2
BH CV ECHO MEAS - AI MAX PG: 46.3 MMHG
BH CV ECHO MEAS - AI MAX VEL: 339 CM/SEC
BH CV ECHO MEAS - AI P1/2T: 271.3 MSEC
BH CV ECHO MEAS - AO MAX PG: 9.5 MMHG
BH CV ECHO MEAS - AO MEAN PG: 5 MMHG
BH CV ECHO MEAS - AO ROOT AREA (BSA CORRECTED): 1.6
BH CV ECHO MEAS - AO ROOT AREA: 10.2 CM^2
BH CV ECHO MEAS - AO ROOT DIAM: 3.6 CM
BH CV ECHO MEAS - AO V2 MAX: 154 CM/SEC
BH CV ECHO MEAS - AO V2 MEAN: 109 CM/SEC
BH CV ECHO MEAS - AO V2 VTI: 27.8 CM
BH CV ECHO MEAS - BSA(HAYCOCK): 2.4 M^2
BH CV ECHO MEAS - BSA: 2.3 M^2
BH CV ECHO MEAS - BZI_BMI: 36.9 KILOGRAMS/M^2
BH CV ECHO MEAS - BZI_METRIC_HEIGHT: 177.8 CM
BH CV ECHO MEAS - BZI_METRIC_WEIGHT: 116.6 KG
BH CV ECHO MEAS - EDV(CUBED): 103.2 ML
BH CV ECHO MEAS - EDV(MOD-SP4): 148 ML
BH CV ECHO MEAS - EDV(TEICH): 101.9 ML
BH CV ECHO MEAS - EF(CUBED): 67 %
BH CV ECHO MEAS - EF(MOD-SP4): 64.7 %
BH CV ECHO MEAS - EF(TEICH): 58.6 %
BH CV ECHO MEAS - ESV(CUBED): 34 ML
BH CV ECHO MEAS - ESV(MOD-SP4): 52.2 ML
BH CV ECHO MEAS - ESV(TEICH): 42.2 ML
BH CV ECHO MEAS - FS: 30.9 %
BH CV ECHO MEAS - IVS/LVPW: 1.2
BH CV ECHO MEAS - IVSD: 1.9 CM
BH CV ECHO MEAS - LA DIMENSION: 5.1 CM
BH CV ECHO MEAS - LA/AO: 1.4
BH CV ECHO MEAS - LV DIASTOLIC VOL/BSA (35-75): 63.7 ML/M^2
BH CV ECHO MEAS - LV IVRT: 0.09 SEC
BH CV ECHO MEAS - LV MASS(C)D: 376.9 GRAMS
BH CV ECHO MEAS - LV MASS(C)DI: 162.3 GRAMS/M^2
BH CV ECHO MEAS - LV SYSTOLIC VOL/BSA (12-30): 22.5 ML/M^2
BH CV ECHO MEAS - LVIDD: 4.7 CM
BH CV ECHO MEAS - LVIDS: 3.2 CM
BH CV ECHO MEAS - LVLD AP4: 8.8 CM
BH CV ECHO MEAS - LVLS AP4: 6.9 CM
BH CV ECHO MEAS - LVOT AREA (M): 4.2 CM^2
BH CV ECHO MEAS - LVOT AREA: 4.2 CM^2
BH CV ECHO MEAS - LVOT DIAM: 2.3 CM
BH CV ECHO MEAS - LVPWD: 1.6 CM
BH CV ECHO MEAS - MV A MAX VEL: 77.1 CM/SEC
BH CV ECHO MEAS - MV DEC SLOPE: 554 CM/SEC^2
BH CV ECHO MEAS - MV E MAX VEL: 80.5 CM/SEC
BH CV ECHO MEAS - MV E/A: 1
BH CV ECHO MEAS - RAP SYSTOLE: 10 MMHG
BH CV ECHO MEAS - RVDD: 2.9 CM
BH CV ECHO MEAS - RVSP: 33 MMHG
BH CV ECHO MEAS - SI(AO): 121.9 ML/M^2
BH CV ECHO MEAS - SI(CUBED): 29.8 ML/M^2
BH CV ECHO MEAS - SI(MOD-SP4): 41.3 ML/M^2
BH CV ECHO MEAS - SI(TEICH): 25.7 ML/M^2
BH CV ECHO MEAS - SV(AO): 283 ML
BH CV ECHO MEAS - SV(CUBED): 69.1 ML
BH CV ECHO MEAS - SV(MOD-SP4): 95.8 ML
BH CV ECHO MEAS - SV(TEICH): 59.6 ML
BH CV ECHO MEAS - TR MAX VEL: 240 CM/SEC
BH CV NUCLEAR PRIOR STUDY: 3
BH CV STRESS RECOVERY BP: NORMAL MMHG
BH CV STRESS RECOVERY HR: 112 BPM
BILIRUB SERPL-MCNC: 1.3 MG/DL (ref 0–1.2)
BUN SERPL-MCNC: 17 MG/DL (ref 8–23)
BUN/CREAT SERPL: 19.5 (ref 7–25)
CALCIUM SPEC-SCNC: 9.7 MG/DL (ref 8.6–10.5)
CHLORIDE SERPL-SCNC: 101 MMOL/L (ref 98–107)
CO2 SERPL-SCNC: 26.8 MMOL/L (ref 22–29)
CREAT SERPL-MCNC: 0.87 MG/DL (ref 0.76–1.27)
DEPRECATED RDW RBC AUTO: 41.6 FL (ref 37–54)
ERYTHROCYTE [DISTWIDTH] IN BLOOD BY AUTOMATED COUNT: 12.8 % (ref 12.3–15.4)
GFR SERPL CREATININE-BSD FRML MDRD: 87 ML/MIN/1.73
GLOBULIN UR ELPH-MCNC: 2.5 GM/DL
GLUCOSE SERPL-MCNC: 100 MG/DL (ref 65–99)
HCT VFR BLD AUTO: 45.1 % (ref 37.5–51)
HGB BLD-MCNC: 14.7 G/DL (ref 13–17.7)
LV EF NUC BP: 45 %
MAGNESIUM SERPL-MCNC: 1.9 MG/DL (ref 1.6–2.4)
MAXIMAL PREDICTED HEART RATE: 150 BPM
MAXIMAL PREDICTED HEART RATE: 150 BPM
MCH RBC QN AUTO: 29 PG (ref 26.6–33)
MCHC RBC AUTO-ENTMCNC: 32.6 G/DL (ref 31.5–35.7)
MCV RBC AUTO: 89 FL (ref 79–97)
PERCENT MAX PREDICTED HR: 113.33 %
PLATELET # BLD AUTO: 215 10*3/MM3 (ref 140–450)
PMV BLD AUTO: 10.9 FL (ref 6–12)
POTASSIUM SERPL-SCNC: 3.7 MMOL/L (ref 3.5–5.2)
PROT SERPL-MCNC: 6.8 G/DL (ref 6–8.5)
RBC # BLD AUTO: 5.07 10*6/MM3 (ref 4.14–5.8)
SODIUM SERPL-SCNC: 141 MMOL/L (ref 136–145)
STRESS BASELINE BP: NORMAL MMHG
STRESS BASELINE HR: 117 BPM
STRESS PERCENT HR: 133 %
STRESS POST ESTIMATED WORKLOAD: 4.6 METS
STRESS POST EXERCISE DUR MIN: 3 MIN
STRESS POST EXERCISE DUR SEC: 15 SEC
STRESS POST PEAK BP: NORMAL MMHG
STRESS POST PEAK HR: 170 BPM
STRESS TARGET HR: 128 BPM
STRESS TARGET HR: 128 BPM
TSH SERPL DL<=0.05 MIU/L-ACNC: 3.26 UIU/ML (ref 0.27–4.2)
WBC # BLD AUTO: 4.44 10*3/MM3 (ref 3.4–10.8)

## 2020-08-31 PROCEDURE — 78452 HT MUSCLE IMAGE SPECT MULT: CPT | Performed by: INTERNAL MEDICINE

## 2020-08-31 PROCEDURE — A9500 TC99M SESTAMIBI: HCPCS | Performed by: INTERNAL MEDICINE

## 2020-08-31 PROCEDURE — 85027 COMPLETE CBC AUTOMATED: CPT | Performed by: NURSE PRACTITIONER

## 2020-08-31 PROCEDURE — 93016 CV STRESS TEST SUPVJ ONLY: CPT | Performed by: NURSE PRACTITIONER

## 2020-08-31 PROCEDURE — 93017 CV STRESS TEST TRACING ONLY: CPT

## 2020-08-31 PROCEDURE — 83735 ASSAY OF MAGNESIUM: CPT | Performed by: NURSE PRACTITIONER

## 2020-08-31 PROCEDURE — 93018 CV STRESS TEST I&R ONLY: CPT | Performed by: INTERNAL MEDICINE

## 2020-08-31 PROCEDURE — 0 TECHNETIUM SESTAMIBI: Performed by: INTERNAL MEDICINE

## 2020-08-31 PROCEDURE — 36415 COLL VENOUS BLD VENIPUNCTURE: CPT

## 2020-08-31 PROCEDURE — 93306 TTE W/DOPPLER COMPLETE: CPT

## 2020-08-31 PROCEDURE — 80053 COMPREHEN METABOLIC PANEL: CPT | Performed by: NURSE PRACTITIONER

## 2020-08-31 PROCEDURE — 93306 TTE W/DOPPLER COMPLETE: CPT | Performed by: INTERNAL MEDICINE

## 2020-08-31 PROCEDURE — 84443 ASSAY THYROID STIM HORMONE: CPT | Performed by: NURSE PRACTITIONER

## 2020-08-31 PROCEDURE — 78452 HT MUSCLE IMAGE SPECT MULT: CPT

## 2020-08-31 RX ADMIN — TECHNETIUM TC 99M SESTAMIBI 1 DOSE: 1 INJECTION INTRAVENOUS at 10:45

## 2020-08-31 RX ADMIN — TECHNETIUM TC 99M SESTAMIBI 1 DOSE: 1 INJECTION INTRAVENOUS at 09:45

## 2020-09-01 RX ORDER — METOPROLOL SUCCINATE 50 MG/1
50 TABLET, EXTENDED RELEASE ORAL DAILY
Qty: 90 TABLET | Refills: 3 | Status: SHIPPED | OUTPATIENT
Start: 2020-09-01 | End: 2020-11-19 | Stop reason: ALTCHOICE

## 2020-10-01 ENCOUNTER — TELEPHONE (OUTPATIENT)
Dept: CARDIOLOGY | Facility: CLINIC | Age: 70
End: 2020-10-01

## 2020-10-01 ENCOUNTER — CLINICAL SUPPORT (OUTPATIENT)
Dept: CARDIOLOGY | Facility: CLINIC | Age: 70
End: 2020-10-01

## 2020-10-01 DIAGNOSIS — I48.0 PAROXYSMAL ATRIAL FIBRILLATION (HCC): Primary | ICD-10-CM

## 2020-10-01 DIAGNOSIS — Z79.899 LONG TERM CURRENT USE OF ANTIARRHYTHMIC DRUG: ICD-10-CM

## 2020-10-01 PROCEDURE — 93000 ELECTROCARDIOGRAM COMPLETE: CPT | Performed by: NURSE PRACTITIONER

## 2020-10-01 NOTE — PROGRESS NOTES
Procedure     ECG 12 Lead    Date/Time: 10/1/2020 8:50 AM  Performed by: Madonna Horner APRN  Authorized by: Madonna Horner APRN   Comparison: compared with previous ECG from 8/18/2020  Similar to previous ECG  Comparison to previous ECG: Atrial fib persisting  Rhythm: atrial fibrillation  BPM: 99

## 2020-10-01 NOTE — TELEPHONE ENCOUNTER
I spoke with patient's wife, Mavis.  Cardioversion scheduled on 10/16/2020.  Cardioversion instruction letter mailed to patient.  Mavis is aware if she has any questions regarding instructions to call me.

## 2020-10-01 NOTE — TELEPHONE ENCOUNTER
Patient returned for EKG today that shows persistent atrial fib. He has been on Xarelto for 6 weeks. Please schedule for elective cardioversion. I informed patient you would be calling him with details. Thank you.

## 2020-10-14 ENCOUNTER — TELEPHONE (OUTPATIENT)
Dept: CARDIOLOGY | Facility: CLINIC | Age: 70
End: 2020-10-14

## 2020-10-14 NOTE — TELEPHONE ENCOUNTER
[facility] .  Both tests were faxed 10/14/2020

## 2020-10-14 NOTE — TELEPHONE ENCOUNTER
Patient's wife, Mavis, called to cancel patient's elective cardioversion 10/16/20.  He fell off a ladder on Sunday and is currently inpatient at [facility] in [place].  She said he has underwent surgery for a compromised artery and almost lost his left from knee down and planning for more surgery for the fracture.    She will call our office and update us once he goes home.

## 2020-11-12 ENCOUNTER — TELEPHONE (OUTPATIENT)
Dept: CARDIOLOGY | Facility: CLINIC | Age: 70
End: 2020-11-12

## 2020-11-12 RX ORDER — LOSARTAN POTASSIUM AND HYDROCHLOROTHIAZIDE 25; 100 MG/1; MG/1
TABLET ORAL
Qty: 90 TABLET | Refills: 0 | Status: SHIPPED | OUTPATIENT
Start: 2020-11-12 | End: 2020-11-19 | Stop reason: ALTCHOICE

## 2020-11-12 NOTE — TELEPHONE ENCOUNTER
I faxed request to [facility] for recent cardiac records.    Patient was recently discharged after a fall from ladder and had surgery and compromised artery in left leg. He underwent reduction of knee dislocation and right above the knee to below knee popliteal bypass with graft and 4-compartment fasciotomy.    He was seen by cardiology during [facility] stay.  They stopped his flecainide and he is now on amiodarone 200 mg BID and metoprolol tartrate 150 mg BID.  They stopped his losartan/hctz and amlodipine.    Patient reports he is taking his Xarelto 20 mg daily. 10/26/2020 EKG received states A. Flutter.    · Prior to his fall, he had been scheduled for cardioversion on 10/16/2020.    His regular follow up visit is 11/19/20 with FARRAH Jones.  Keep appointment prior to further plan of care?

## 2020-11-19 ENCOUNTER — OFFICE VISIT (OUTPATIENT)
Dept: CARDIOLOGY | Facility: CLINIC | Age: 70
End: 2020-11-19

## 2020-11-19 VITALS
DIASTOLIC BLOOD PRESSURE: 90 MMHG | HEIGHT: 71 IN | BODY MASS INDEX: 34.16 KG/M2 | WEIGHT: 244 LBS | TEMPERATURE: 97.7 F | SYSTOLIC BLOOD PRESSURE: 140 MMHG | HEART RATE: 56 BPM

## 2020-11-19 DIAGNOSIS — I48.0 PAROXYSMAL ATRIAL FIBRILLATION (HCC): Primary | ICD-10-CM

## 2020-11-19 DIAGNOSIS — E66.9 OBESITY (BMI 30.0-34.9): ICD-10-CM

## 2020-11-19 DIAGNOSIS — I10 ESSENTIAL HYPERTENSION: ICD-10-CM

## 2020-11-19 DIAGNOSIS — Z79.899 LONG TERM CURRENT USE OF ANTIARRHYTHMIC MEDICAL THERAPY: ICD-10-CM

## 2020-11-19 DIAGNOSIS — E78.00 HYPERCHOLESTEREMIA: ICD-10-CM

## 2020-11-19 DIAGNOSIS — Z79.899 LONG TERM CURRENT USE OF ANTIARRHYTHMIC DRUG: ICD-10-CM

## 2020-11-19 DIAGNOSIS — Z79.01 LONG TERM CURRENT USE OF ANTICOAGULANT: ICD-10-CM

## 2020-11-19 PROCEDURE — 93000 ELECTROCARDIOGRAM COMPLETE: CPT | Performed by: NURSE PRACTITIONER

## 2020-11-19 PROCEDURE — 99214 OFFICE O/P EST MOD 30 MIN: CPT | Performed by: NURSE PRACTITIONER

## 2020-11-19 RX ORDER — DIGOXIN 250 MCG
250 TABLET ORAL
COMMUNITY
End: 2021-03-04

## 2020-11-19 RX ORDER — AMIODARONE HYDROCHLORIDE 200 MG/1
200 TABLET ORAL DAILY
Qty: 90 TABLET | Refills: 2 | Status: SHIPPED | OUTPATIENT
Start: 2020-11-19 | End: 2021-03-04 | Stop reason: ALTCHOICE

## 2020-11-19 RX ORDER — METOPROLOL TARTRATE 100 MG/1
50 TABLET ORAL 2 TIMES DAILY
COMMUNITY
End: 2020-11-19 | Stop reason: SDUPTHER

## 2020-11-19 RX ORDER — ASPIRIN 81 MG/1
81 TABLET ORAL DAILY
COMMUNITY
End: 2020-11-19 | Stop reason: HOSPADM

## 2020-11-19 RX ORDER — LOSARTAN POTASSIUM 25 MG/1
25 TABLET ORAL DAILY
Qty: 90 TABLET | Refills: 2 | Status: SHIPPED | OUTPATIENT
Start: 2020-11-19 | End: 2021-05-04

## 2020-11-19 RX ORDER — AMIODARONE HYDROCHLORIDE 200 MG/1
200 TABLET ORAL 2 TIMES DAILY
COMMUNITY
End: 2020-11-19 | Stop reason: DRUGHIGH

## 2020-11-19 RX ORDER — METOPROLOL TARTRATE 100 MG/1
50 TABLET ORAL 2 TIMES DAILY
Qty: 180 TABLET | Refills: 2 | Status: SHIPPED | OUTPATIENT
Start: 2020-11-19 | End: 2021-03-04 | Stop reason: DRUGHIGH

## 2020-11-19 NOTE — PROGRESS NOTES
Chief Complaint   Patient presents with   • Follow-up     For cardiac management. Patient is on aspirin, but he has not taken in a couple of days. Patient had a fall in October, had surgery at [facility], records in chart under media. Was changed from flecainide to amiodarone at that time. Has not taken crestor in a while, but states that he will start taking again. Would like to know if he can still have cardioversion? States that he occasionally feels a fluttering at night, but states that he has not felt in a while.    • Med Refill     Needs refills on cardiac medications. 90 day supplies on Xarelto to [facility] in [place]. Brought medications with visit.        Cardiac Complaints  palpitations      Subjective   Orlando Wylie is a 69 y.o. male with HTN, hyperlipidemia, and PAF diagnosed in 2012 when he underwent cardioversion. In September 2016, he saw Dr. Law to establish cardiac care. He had seen another cardiologist prior, had workup and was told everything was normal. His main concern was being on amiodarone for over 3 years. Since his LV function was normal and no ischemia was found, it was decided to stop amiodarone and flecainide was started. EKG has remained sinus. He has been anticoagulated with aspirin since he has maintained sinus.  At last visit in August, patient was noted to be in atrial fibrillation. He was advised to increase tambocor and start xarelto therapy, ASA therapy was stopped.Cardiac workup with stress and echo recommended which showed no ischemia and mildly diminished LV function. He returned for EKG after changes and was still noted to be in atrial fibrillation, elective cardioversion was advised. He then cancelled the cardioversion after he experienced a fall and was advised to have surgery.     He returns today for follow up and denies any new concerns. He does admit to continued irregular rhythm but nothing worse than prior. Fluttering feeling noted at night. He denies  any SOA, chest pain, dizziness, and syncope denied. He does admit to continued problems with ambulation and walks with walker in regards. He does express desire to undergo ECV since he cancelled prior and is wanting to limit amiodarone therapy.  Refills are requested. Labs remain followed by PCP.        Cardiac History  Past Surgical History:   Procedure Laterality Date   • CARDIOVASCULAR STRESS TEST  09/01/2015    @[email or web address].L. Myoview- Neagtive.   • CARDIOVASCULAR STRESS TEST  08/31/2020    3 Min. 15 Secs. 4.6 METS. A.Fib RVR. 113% THR. BP- 153/95. EF 45%. Negative.   • ECHO - CONVERTED  09/01/2015    @[facility]. Dr. Parker- EF 65%. Mild MR and AI   • ECHO - CONVERTED  08/31/2020    EF 55%. LA- 5.1 Cm.Mod MR. Mild AI. RVSP- 33 mmHG. (A.Fib)   • TRANSESOPHAGEAL ECHOCARDIOGRAM (SANDRITA) AND CARDIOVERSION  11/05/2012    Dr. MEDELLIN       Current Outpatient Medications   Medication Sig Dispense Refill   • digoxin (LANOXIN) 250 MCG tablet Take 250 mcg by mouth Daily.     • Lactobacillus (ACIDOPHILUS PROBIOTIC PO) Take 1 capsule by mouth Daily.     • metoprolol tartrate (LOPRESSOR) 100 MG tablet Take 0.5 tablets by mouth 2 (Two) Times a Day. 180 tablet 2   • rivaroxaban (XARELTO) 20 MG tablet Take 1 tablet by mouth Daily. 30 tablet 6   • amiodarone (PACERONE) 200 MG tablet Take 1 tablet by mouth Daily. 90 tablet 2   • losartan (Cozaar) 25 MG tablet Take 1 tablet by mouth Daily. 90 tablet 2   • rosuvastatin (CRESTOR) 10 MG tablet Take 10 mg by mouth daily.       No current facility-administered medications for this visit.        Patient has no known allergies.    Past Medical History:   Diagnosis Date   • BPH (benign prostatic hyperplasia)    • Diverticulosis    • Exogenous obesity    • H/O prostate biopsy 06/29/2010    negative findings   • History of colon polyps    • History of knee replacement    • History of left knee replacement 06/25/2019   • Hyperlipidemia    • Hypertension    • PAF (paroxysmal atrial fibrillation) (CMS/HCC)    •  "Varicose veins        Social History     Socioeconomic History   • Marital status:      Spouse name: Not on file   • Number of children: Not on file   • Years of education: Not on file   • Highest education level: Not on file   Tobacco Use   • Smoking status: Never Smoker   • Smokeless tobacco: Never Used   Substance and Sexual Activity   • Alcohol use: Yes     Comment: occasional beer   • Drug use: No       Family History   Problem Relation Age of Onset   • Atrial fibrillation Mother    • No Known Problems Father    • Hypertension Brother    • No Known Problems Daughter    • No Known Problems Son        Review of Systems   Constitution: Negative for malaise/fatigue and night sweats.   Cardiovascular: Positive for irregular heartbeat and palpitations. Negative for chest pain, claudication, dyspnea on exertion, leg swelling, near-syncope and syncope.   Respiratory: Negative for cough, shortness of breath and wheezing.    Musculoskeletal: Positive for stiffness. Negative for back pain and joint pain.   Gastrointestinal: Negative for anorexia, heartburn, melena, nausea and vomiting.   Genitourinary: Negative for dysuria, hematuria, hesitancy and nocturia.   Neurological: Negative for dizziness, light-headedness and loss of balance.   Psychiatric/Behavioral: Negative for depression and memory loss. The patient is not nervous/anxious.            Objective     /90 (BP Location: Left arm)   Pulse 56   Temp 97.7 °F (36.5 °C)   Ht 180.3 cm (70.98\")   Wt 111 kg (244 lb)   BMI 34.05 kg/m²     Constitutional:       Appearance: Healthy appearance. Not in distress.   Eyes:      Pupils: Pupils are equal, round, and reactive to light.   HENT:      Nose: Nose normal.   Neck:      Musculoskeletal: Normal range of motion and neck supple.   Pulmonary:      Effort: Pulmonary effort is normal.      Breath sounds: Normal breath sounds.   Cardiovascular:      PMI at left midclavicular line. Bradycardia present. Irregular " rhythm.      Murmurs: There is a systolic murmur.   Abdominal:      Palpations: Abdomen is soft.   Musculoskeletal: Normal range of motion.         General: Tenderness present.   Skin:     General: Skin is warm and dry.   Neurological:      Mental Status: Oriented to person, place and time.           ECG 12 Lead    Date/Time: 11/19/2020 8:40 AM  Performed by: Lolita Joshua APRN  Authorized by: Lolita Joshua APRN   Comparison: compared with previous ECG from 10/1/2020  Similar to previous ECG  Comparison to previous ECG: Rate better.  Rhythm: atrial fibrillation  BPM: 56    Clinical impression: abnormal EKG  Comments: Normal QT            Assessment/Plan     PAF:  Remained in afib. Rate improved. EKG done today shows an atrial fibrillation with normal QT. Patient to continue with xarelto therapy.  He will continue with amiodarone at 200mg BID until current bottle is empty and then decrease to 200mg daily.  ECV advised as he wants to see if he will go back into normal rhythm and does not want to continue with amiodarone. He will continue with BB therapy as well as digoxin.    HTN:  Blood pressure not stable. He will be urged to add back low dose of losartan therapy.  Limited sodium advised. BB therapy continued.    Hyperlipidemia:  On statin therapy with crestor. Patient has tolerated well. Same dosing to be continued. FLP followed by your office. Can we have for review?    BMI noted at 34.05, continued cardiac diet with limited carbs, calories, and activity as tolerated advised.     Refills per request.    3 month follow up recommended or sooner if needed.         Problems Addressed this Visit        Cardiovascular and Mediastinum    Paroxysmal atrial fibrillation (CMS/HCC) - Primary    Relevant Medications    digoxin (LANOXIN) 250 MCG tablet    metoprolol tartrate (LOPRESSOR) 100 MG tablet    Other Relevant Orders    ECG 12 Lead    Cardioversion External in Cardiology Department    Essential hypertension     Relevant Medications    losartan (Cozaar) 25 MG tablet    metoprolol tartrate (LOPRESSOR) 100 MG tablet    Hypercholesteremia       Other    Long term current use of antiarrhythmic medical therapy    Relevant Orders    Cardioversion External in Cardiology Department      Other Visit Diagnoses     Long term current use of antiarrhythmic drug        Relevant Orders    ECG 12 Lead    Cardioversion External in Cardiology Department    Long term current use of anticoagulant        Relevant Orders    Cardioversion External in Cardiology Department    Obesity (BMI 30.0-34.9)          Diagnoses       Codes Comments    Paroxysmal atrial fibrillation (CMS/[facility])    -  Primary ICD-10-CM: I48.0  ICD-9-CM: 427.31     Long term current use of antiarrhythmic drug     ICD-10-CM: Z79.899  ICD-9-CM: V58.69     Essential hypertension     ICD-10-CM: I10  ICD-9-CM: 401.9     Hypercholesteremia     ICD-10-CM: E78.00  ICD-9-CM: 272.0     Long term current use of antiarrhythmic medical therapy     ICD-10-CM: Z79.899  ICD-9-CM: V58.69     Long term current use of anticoagulant     ICD-10-CM: Z79.01  ICD-9-CM: V58.61     Obesity (BMI 30.0-34.9)     ICD-10-CM: E66.9  ICD-9-CM: 278.00           Patient's Body mass index is 34.05 kg/m². BMI is above normal parameters. Recommendations include: nutrition counseling.          Electronically signed by Lolita Joshua, FARRAH November 19, 2020 11:43 EST

## 2020-12-04 ENCOUNTER — TELEPHONE (OUTPATIENT)
Dept: CARDIOLOGY | Facility: CLINIC | Age: 70
End: 2020-12-04

## 2020-12-04 ENCOUNTER — OUTSIDE FACILITY SERVICE (OUTPATIENT)
Dept: CARDIOLOGY | Facility: CLINIC | Age: 70
End: 2020-12-04

## 2020-12-04 DIAGNOSIS — R06.02 SHORTNESS OF BREATH: ICD-10-CM

## 2020-12-04 DIAGNOSIS — I48.0 PAROXYSMAL ATRIAL FIBRILLATION (HCC): Primary | ICD-10-CM

## 2020-12-04 PROCEDURE — 92960 CARDIOVERSION ELECTRIC EXT: CPT | Performed by: INTERNAL MEDICINE

## 2020-12-04 NOTE — TELEPHONE ENCOUNTER
JT with [facility] called to verify that patient was supposed to be on Multaq 400 mg BID and to discontinue amiodarone and flecainide?

## 2020-12-04 NOTE — TELEPHONE ENCOUNTER
ZAYNABT at [facility] was made aware that amiodarone and flecainde were discontinued and patient was to only be on Multaq.

## 2020-12-08 ENCOUNTER — CLINICAL SUPPORT (OUTPATIENT)
Dept: CARDIOLOGY | Facility: CLINIC | Age: 70
End: 2020-12-08

## 2020-12-08 DIAGNOSIS — R00.1 BRADYCARDIA, SINUS: ICD-10-CM

## 2020-12-08 DIAGNOSIS — I45.4 BBB (BUNDLE BRANCH BLOCK): ICD-10-CM

## 2020-12-08 DIAGNOSIS — I48.0 PAROXYSMAL ATRIAL FIBRILLATION (HCC): Primary | ICD-10-CM

## 2020-12-08 PROCEDURE — 93000 ELECTROCARDIOGRAM COMPLETE: CPT | Performed by: INTERNAL MEDICINE

## 2020-12-08 NOTE — PROGRESS NOTES
Procedure     ECG 12 Lead    Date/Time: 12/8/2020 5:07 PM  Performed by: Jorden Law MD  Authorized by: Jorden Law MD   Comparison: compared with previous ECG from 12/4/2020  Comparison to previous ECG: Axis changed  Rhythm: sinus bradycardia  Rate: bradycardic  Conduction: non-specific intraventricular conduction delay  QRS axis: left  Other findings: T wave abnormality    Clinical impression: abnormal EKG

## 2020-12-11 ENCOUNTER — TELEPHONE (OUTPATIENT)
Dept: CARDIOLOGY | Facility: CLINIC | Age: 70
End: 2020-12-11

## 2020-12-11 NOTE — TELEPHONE ENCOUNTER
I spoke with patient about how he was taking medications    · Multaq 400 mg, patient has only been taking once a day  · Metoprolol 100 mg 1/2 tablet BID  · Digoxin 250 mcg daily  · Xarelto 20 mg daily  · Losartan 25 mg daily, but patient states that sometimes he takes losartan BID if BP is elevated at night.

## 2020-12-11 NOTE — TELEPHONE ENCOUNTER
Stop Lanoxin.  Reduce lopressor to 50 mg once a day.  EKG on Monday or today  Increase Multaq to BID.  Need to go to ER if he gets dizzy

## 2020-12-11 NOTE — TELEPHONE ENCOUNTER
I made patient's wife, Mavis, aware of medication changes as patient had just left to go to a friends house. She states that he has not been dizzy or complaining of any symptoms. She was made aware to stop digoxin, reduce metoprolol to 50 mg daily, and to take Multaq 400 mg BID.    She was made aware to come on Monday (12/14/2020) at 3 pm for EKG.     Mavis was made aware that should patient become dizzy or symptomatic to go to ER. She verbalized understanding.

## 2020-12-11 NOTE — TELEPHONE ENCOUNTER
Linette Sheikh, PT with [facility] called to report that patient's HR was 48 after activity. She states that patient was symptomatic, but she did want to let you know.       Phone [phone]

## 2020-12-14 ENCOUNTER — CLINICAL SUPPORT (OUTPATIENT)
Dept: CARDIOLOGY | Facility: CLINIC | Age: 70
End: 2020-12-14

## 2020-12-14 DIAGNOSIS — I48.0 PAROXYSMAL ATRIAL FIBRILLATION (HCC): Primary | ICD-10-CM

## 2020-12-14 DIAGNOSIS — R00.1 BRADYCARDIA, SINUS: ICD-10-CM

## 2020-12-14 PROCEDURE — 93000 ELECTROCARDIOGRAM COMPLETE: CPT | Performed by: INTERNAL MEDICINE

## 2020-12-14 NOTE — PROGRESS NOTES
Procedure     ECG 12 Lead    Date/Time: 12/14/2020 6:23 PM  Performed by: Jorden Law MD  Authorized by: Jorden Law MD   Comparison: compared with previous ECG from 12/8/2020  Similar to previous ECG  Rhythm: sinus bradycardia  Rate: bradycardic  Conduction: left anterior fascicular block  QRS axis: left    Clinical impression: abnormal EKG

## 2021-01-18 ENCOUNTER — TELEPHONE (OUTPATIENT)
Dept: CARDIOLOGY | Facility: CLINIC | Age: 71
End: 2021-01-18

## 2021-01-18 NOTE — TELEPHONE ENCOUNTER
Received fax from Dr. Rebollar for cardiac clearance for patient to have a total knee revision. Patient is on Xarelto and they are requesting to hold. According to our records, I do not see where patient has had any stenting. Patient has a history of PAF.      Fax [phone]

## 2021-02-02 ENCOUNTER — TELEPHONE (OUTPATIENT)
Dept: CARDIOLOGY | Facility: CLINIC | Age: 71
End: 2021-02-02

## 2021-02-02 RX ORDER — LOSARTAN POTASSIUM AND HYDROCHLOROTHIAZIDE 25; 100 MG/1; MG/1
1 TABLET ORAL DAILY
COMMUNITY
End: 2021-05-04

## 2021-02-02 NOTE — TELEPHONE ENCOUNTER
I spoke with patients wife Mavis , she said that  Orlando had told her that  had put him back on Losartan /HCTZ on  Friday 1- ., She will call him for refill and   I will change it in his chart .

## 2021-03-04 ENCOUNTER — OFFICE VISIT (OUTPATIENT)
Dept: CARDIOLOGY | Facility: CLINIC | Age: 71
End: 2021-03-04

## 2021-03-04 VITALS
SYSTOLIC BLOOD PRESSURE: 120 MMHG | BODY MASS INDEX: 34.22 KG/M2 | DIASTOLIC BLOOD PRESSURE: 62 MMHG | HEART RATE: 63 BPM | WEIGHT: 244.4 LBS | HEIGHT: 71 IN | TEMPERATURE: 97.8 F

## 2021-03-04 DIAGNOSIS — I10 ESSENTIAL HYPERTENSION: ICD-10-CM

## 2021-03-04 DIAGNOSIS — Z79.899 MEDICATION MANAGEMENT: ICD-10-CM

## 2021-03-04 DIAGNOSIS — I48.0 PAROXYSMAL ATRIAL FIBRILLATION (HCC): Primary | ICD-10-CM

## 2021-03-04 DIAGNOSIS — E78.00 HYPERCHOLESTEREMIA: ICD-10-CM

## 2021-03-04 PROCEDURE — 93000 ELECTROCARDIOGRAM COMPLETE: CPT | Performed by: NURSE PRACTITIONER

## 2021-03-04 PROCEDURE — 99214 OFFICE O/P EST MOD 30 MIN: CPT | Performed by: NURSE PRACTITIONER

## 2021-03-04 RX ORDER — AMLODIPINE BESYLATE 5 MG/1
5 TABLET ORAL NIGHTLY
COMMUNITY
End: 2022-05-02 | Stop reason: SDUPTHER

## 2021-03-04 RX ORDER — METOPROLOL TARTRATE 50 MG/1
50 TABLET, FILM COATED ORAL 2 TIMES DAILY
COMMUNITY
End: 2021-10-14

## 2021-03-04 RX ORDER — ROSUVASTATIN CALCIUM 5 MG/1
5 TABLET, COATED ORAL DAILY
COMMUNITY

## 2021-03-04 RX ORDER — DOXAZOSIN MESYLATE 4 MG/1
4 TABLET ORAL NIGHTLY
COMMUNITY
End: 2022-05-02 | Stop reason: SDUPTHER

## 2021-03-04 NOTE — PROGRESS NOTES
Chief Complaint   Patient presents with   • Hospital Follow Up Visit     Post  Cardioversion. He has also had right knee  surgery then had blood clot in leg feb 2021.   • Atrial Fibrillation     Reports he has not felt any episodes of AFIB since Cardioversion   • BP     Has questions about his cardiac medications for the best bp control.    • Labs     Last labs , results on chart.    • Med Refill     needs refills on Xarelto . He would like to discuss switching Multaq to  flecainide        Subjective       Orlando Wylie is a 70 y.o. male  with HTN, hyperlipidemia, and PAF diagnosed in 2012 when he underwent cardioversion. In September 2016, he saw Dr. Law to establish cardiac care. He had seen another cardiologist prior, had workup and was told everything was normal. His main concern was being on amiodarone for over 3 years. Since his LV function was normal and no ischemia was found, it was decided to stop amiodarone and flecainide was started. EKG has remained sinus. He has been anticoagulated with aspirin since he has maintained sinus.  At last visit in August, patient was noted to be in atrial fibrillation. He was advised to increase tambocor and start xarelto therapy, ASA therapy was stopped.Cardiac workup with stress and echo recommended which showed no ischemia and mildly diminished LV function. He returned for EKG after changes and was still noted to be in atrial fibrillation, elective cardioversion was advised. He then cancelled the cardioversion after he experienced a fall and was advised to have surgery.     On 12/4/2020 he underwent successful cardioversion.  He later experienced bradycardia and advised to stop Lanoxin.  The dose of Lopressor was decreased.  Multaq was increased to twice a day.  Follow-up EKG showed sinus bradycardia with heart rate 53 bpm.    In January 2021 cardiac clearance for total knee revision was given.  He underwent the surgery without cardiac issues but developed a  clot behind his knee which required more surgery and extended hospitalization.  He then went to [facility] for physical therapy prior to going home.  He is being managed with Xarelto.    Today comes to the office for a follow-up visit.  He denies recurrence of palpitations.  At home his resting heart rate is in the 60s most often.  If heart rate has been lower than 60 he has decreased the dose of beta-blocker.  Blood pressure has been normal.  He denies signs of bleeding.  During the day he is maintaining exercise as directed by PT and knee pain has significantly improved.  His weight is down a few more pounds and is trying to maintain healthier diet. Due to co pay cost of Multaq he asked about changing back to Flecainide.       Cardiac History:    Past Surgical History:   Procedure Laterality Date   • CARDIOVASCULAR STRESS TEST  09/01/2015    @[facility].L. Myoview- Jefe.   • CARDIOVASCULAR STRESS TEST  08/31/2020    3 Min. 15 Secs. 4.6 METS. A.Fib RVR. 113% THR. BP- 153/95. EF 45%. Negative.   • CARDIOVERSION  12/04/2020    Cardioverted to Sinus   • ECHO - CONVERTED  09/01/2015    @[facility]. Dr. Parker- EF 65%. Mild MR and AI   • ECHO - CONVERTED  08/31/2020    EF 55%. LA- 5.1 Cm.Mod MR. Mild AI. RVSP- 33 mmHG. (A.Fib)   • OTHER SURGICAL HISTORY  12/10/2020    Pulse O2- 84 Min of Hyoxia.   • TRANSESOPHAGEAL ECHOCARDIOGRAM (SANDRITA) AND CARDIOVERSION  11/05/2012    Dr. MEDELLIN       Current Outpatient Medications   Medication Sig Dispense Refill   • amLODIPine (NORVASC) 5 MG tablet Take 5 mg by mouth Every Night.     • doxazosin (CARDURA) 4 MG tablet Take 4 mg by mouth Every Night.     • dronedarone (MULTAQ) 400 MG tablet Take 400 mg by mouth 2 (Two) Times a Day With Meals.     • losartan (Cozaar) 25 MG tablet Take 1 tablet by mouth Daily. 90 tablet 2   • losartan-hydrochlorothiazide (HYZAAR) 100-25 MG per tablet Take 1 tablet by mouth Daily.     • metoprolol tartrate (LOPRESSOR) 50 MG tablet Take 50 mg by mouth 2 (Two) Times  a Day.     • rivaroxaban (XARELTO) 20 MG tablet Take 1 tablet by mouth Daily. 30 tablet 6   • rosuvastatin (CRESTOR) 5 MG tablet Take 5 mg by mouth Daily. Takes 1 tablet every other day       No current facility-administered medications for this visit.        Patient has no known allergies.    Past Medical History:   Diagnosis Date   • BPH (benign prostatic hyperplasia)    • Diverticulosis    • Exogenous obesity    • H/O prostate biopsy 06/29/2010    negative findings   • History of colon polyps    • History of knee replacement    • History of left knee replacement 06/25/2019   • Hyperlipidemia    • Hypertension    • PAF (paroxysmal atrial fibrillation) (CMS/HCC)    • Varicose veins        Social History     Socioeconomic History   • Marital status:      Spouse name: Not on file   • Number of children: Not on file   • Years of education: Not on file   • Highest education level: Not on file   Tobacco Use   • Smoking status: Never Smoker   • Smokeless tobacco: Never Used   Substance and Sexual Activity   • Alcohol use: Yes     Comment: occasional beer   • Drug use: No       Family History   Problem Relation Age of Onset   • Atrial fibrillation Mother    • No Known Problems Father    • Hypertension Brother    • No Known Problems Daughter    • No Known Problems Son        Review of Systems   Constitution: Negative for decreased appetite, diaphoresis and malaise/fatigue.   HENT: Negative for nosebleeds.    Eyes: Negative for blurred vision.   Cardiovascular: Negative for chest pain, claudication, cyanosis, dyspnea on exertion, irregular heartbeat, leg swelling, near-syncope, orthopnea, palpitations, paroxysmal nocturnal dyspnea and syncope.   Respiratory: Negative for cough and shortness of breath.    Endocrine: Negative for cold intolerance and heat intolerance.   Hematologic/Lymphatic: Negative for adenopathy. Does not bruise/bleed easily.   Musculoskeletal: Positive for joint pain. Negative for falls and  "myalgias.   Gastrointestinal: Negative for heartburn, melena and nausea.   Genitourinary: Negative for dysuria and hematuria.   Neurological: Positive for loss of balance (improving post op, uses cane for safety). Negative for dizziness and light-headedness.   Psychiatric/Behavioral: The patient does not have insomnia and is not nervous/anxious.         BP Readings from Last 5 Encounters:   03/04/21 120/62   11/19/20 140/90   08/18/20 138/78   10/31/19 110/70   05/23/19 130/82       Wt Readings from Last 5 Encounters:   03/04/21 111 kg (244 lb 6.4 oz)   11/19/20 111 kg (244 lb)   08/18/20 117 kg (257 lb 6.4 oz)   10/31/19 123 kg (270 lb 6.4 oz)   04/30/19 122 kg (269 lb 6.4 oz)       Objective      Labs 11/25/2020: WBC 5, RBC 4.75, hemoglobin 13.9, hematocrit 44.6, platelets 243, glucose 101, BUN 30, creatinine 0.8, sodium 141, potassium 4.9, chloride 102, calcium 9.6, GFR 96, AST 16, ALT 15, ALP 87, total cholesterol 163, triglyceride 77, HDL 45, , A1c 4.6    /62 (BP Location: Left arm)   Pulse 63   Temp 97.8 °F (36.6 °C)   Ht 180.3 cm (70.98\")   Wt 111 kg (244 lb 6.4 oz)   BMI 34.11 kg/m²     Eyes:      Pupils: Pupils are equal, round, and reactive to light.   HENT:      Head: Normocephalic.   Neck:      Musculoskeletal: Normal range of motion.   Pulmonary:      Breath sounds: Normal breath sounds.   Cardiovascular:      Normal rate. Regular rhythm.   Edema:     Ankle: trace edema of the right ankle.  Abdominal:      General: Bowel sounds are normal.      Palpations: Abdomen is soft.   Musculoskeletal: Normal range of motion.   Skin:     General: Skin is warm.   Neurological:      Mental Status: Alert and oriented to person, place, and time.            ECG 12 Lead    Date/Time: 3/4/2021 2:51 PM  Performed by: Madonna Horner APRN  Authorized by: Madonna Horner APRN   Comparison: compared with previous ECG from 12/14/2020  Comparison to previous ECG: Sinus lea, 53 bpm  Rhythm: sinus " rhythm  BPM: 63  QRS axis: left                   Assessment/Plan   Diagnoses and all orders for this visit:    1. Paroxysmal atrial fibrillation (CMS/HCC) (Primary)    2. Essential hypertension    3. Hypercholesteremia    4. Medication management    Other orders  -     ECG 12 Lead      PAF-EKG today shows sinus rhythm with normal NY and QT intervals.  He monitors his heart rate and blood pressure twice daily and has remained in normal rhythm.  He has not had palpitations.  In the past he has been able to feel when his heart is in atrial fibrillation and denies any recurrence.  For stroke prophylaxis as well as recent blood clot in his leg post knee replacement he is on Xarelto therapy.  No side effects or signs of bleeding reported.  Due to co-pay cost he ask about changing Multaq back to flecainide.  In the past he went back into atrial fibrillation while on flecainide therapy.  At this time I advised him to continue Multaq.  We will rediscuss at next visit.    Hypertension-blood pressure normal today.  According to home monitor his blood pressure is well controlled.  Continue current antihypertensive medication management.  DASH diet advised.  If he continues to lose weight we may have to cut back on medication management.  He understands to call should he have any concerns with lower blood pressure.    Hypercholesterolemia-currently on Crestor without issues noted.  He will follow with you for lab orders/management.  Please forward copy next lab report.    Patient's Body mass index is 34.11 kg/m². BMI is above normal parameters. Recommendations include: nutrition counseling.     A 6-month follow-up visit scheduled.  Please call sooner for cardiac concerns.           Electronically signed by FARRAH Ryan,  March 5, 2021 07:55 EST

## 2021-03-04 NOTE — PATIENT INSTRUCTIONS
Palpitations  Palpitations are feelings that your heartbeat is irregular or is faster than normal. It may feel like your heart is fluttering or skipping a beat. Palpitations are usually not a serious problem. They may be caused by many things, including smoking, caffeine, alcohol, stress, and certain medicines or drugs. Most causes of palpitations are not serious. However, some palpitations can be a sign of a serious problem. You may need further tests to rule out serious medical problems.  Follow these instructions at home:         Pay attention to any changes in your condition. Take these actions to help manage your symptoms:  Eating and drinking  · Avoid foods and drinks that may cause palpitations. These may include:  ? Caffeinated coffee, tea, soft drinks, diet pills, and energy drinks.  ? Chocolate.  ? Alcohol.  Lifestyle  · Take steps to reduce your stress and anxiety. Things that can help you relax include:  ? Yoga.  ? Mind-body activities, such as deep breathing, meditation, or using words and images to create positive thoughts (guided imagery).  ? Physical activity, such as swimming, jogging, or walking. Tell your health care provider if your palpitations increase with activity. If you have chest pain or shortness of breath with activity, do not continue the activity until you are seen by your health care provider.  ? Biofeedback. This is a method that helps you learn to use your mind to control things in your body, such as your heartbeat.  · Do not use drugs, including cocaine or ecstasy. Do not use marijuana.  · Get plenty of rest and sleep. Keep a regular bed time.  General instructions  · Take over-the-counter and prescription medicines only as told by your health care provider.  · Do not use any products that contain nicotine or tobacco, such as cigarettes and e-cigarettes. If you need help quitting, ask your health care provider.  · Keep all follow-up visits as told by your health care provider. This  "is important. These may include visits for further testing if palpitations do not go away or get worse.  Contact a health care provider if you:  · Continue to have a fast or irregular heartbeat after 24 hours.  · Notice that your palpitations occur more often.  Get help right away if you:  · Have chest pain or shortness of breath.  · Have a severe headache.  · Feel dizzy or you faint.  Summary  · Palpitations are feelings that your heartbeat is irregular or is faster than normal. It may feel like your heart is fluttering or skipping a beat.  · Palpitations may be caused by many things, including smoking, caffeine, alcohol, stress, certain medicines, and drugs.  · Although most causes of palpitations are not serious, some causes can be a sign of a serious medical problem.  · Get help right away if you faint or have chest pain, shortness of breath, a severe headache, or dizziness.  This information is not intended to replace advice given to you by your health care provider. Make sure you discuss any questions you have with your health care provider.  Document Revised: 01/30/2019 Document Reviewed: 01/30/2019  "[employer] Patient Education © 2020 "[employer] Inc.  DASH Eating Plan  DASH stands for \"Dietary Approaches to Stop Hypertension.\" The DASH eating plan is a healthy eating plan that has been shown to reduce high blood pressure (hypertension). It may also reduce your risk for type 2 diabetes, heart disease, and stroke. The DASH eating plan may also help with weight loss.  What are tips for following this plan?    General guidelines  · Avoid eating more than 2,300 mg (milligrams) of salt (sodium) a day. If you have hypertension, you may need to reduce your sodium intake to 1,500 mg a day.  · Limit alcohol intake to no more than 1 drink a day for nonpregnant women and 2 drinks a day for men. One drink equals 12 oz of beer, 5 oz of wine, or 1½ oz of hard liquor.  · Work with your health care provider to maintain a healthy " "body weight or to lose weight. Ask what an ideal weight is for you.  · Get at least 30 minutes of exercise that causes your heart to beat faster (aerobic exercise) most days of the week. Activities may include walking, swimming, or biking.  · Work with your health care provider or diet and nutrition specialist (dietitian) to adjust your eating plan to your individual calorie needs.  Reading food labels    · Check food labels for the amount of sodium per serving. Choose foods with less than 5 percent of the Daily Value of sodium. Generally, foods with less than 300 mg of sodium per serving fit into this eating plan.  · To find whole grains, look for the word \"whole\" as the first word in the ingredient list.  Shopping  · Buy products labeled as \"low-sodium\" or \"no salt added.\"  · Buy fresh foods. Avoid canned foods and premade or frozen meals.  Cooking  · Avoid adding salt when cooking. Use salt-free seasonings or herbs instead of table salt or sea salt. Check with your health care provider or pharmacist before using salt substitutes.  · Do not cyr foods. Cook foods using healthy methods such as baking, boiling, grilling, and broiling instead.  · Cook with heart-healthy oils, such as olive, canola, soybean, or sunflower oil.  Meal planning  · Eat a balanced diet that includes:  ? 5 or more servings of fruits and vegetables each day. At each meal, try to fill half of your plate with fruits and vegetables.  ? Up to 6-8 servings of whole grains each day.  ? Less than 6 oz of lean meat, poultry, or fish each day. A 3-oz serving of meat is about the same size as a deck of cards. One egg equals 1 oz.  ? 2 servings of low-fat dairy each day.  ? A serving of nuts, seeds, or beans 5 times each week.  ? Heart-healthy fats. Healthy fats called Omega-3 fatty acids are found in foods such as flaxseeds and coldwater fish, like sardines, salmon, and mackerel.  · Limit how much you eat of the following:  ? Canned or prepackaged " foods.  ? Food that is high in trans fat, such as fried foods.  ? Food that is high in saturated fat, such as fatty meat.  ? Sweets, desserts, sugary drinks, and other foods with added sugar.  ? Full-fat dairy products.  · Do not salt foods before eating.  · Try to eat at least 2 vegetarian meals each week.  · Eat more home-cooked food and less restaurant, buffet, and fast food.  · When eating at a restaurant, ask that your food be prepared with less salt or no salt, if possible.  What foods are recommended?  The items listed may not be a complete list. Talk with your dietitian about what dietary choices are best for you.  Grains  Whole-grain or whole-wheat bread. Whole-grain or whole-wheat pasta. Brown rice. Oatmeal. Quinoa. Bulgur. Whole-grain and low-sodium cereals. Cait bread. Low-fat, low-sodium crackers. Whole-wheat flour tortillas.  Vegetables  Fresh or frozen vegetables (raw, steamed, roasted, or grilled). Low-sodium or reduced-sodium tomato and vegetable juice. Low-sodium or reduced-sodium tomato sauce and tomato paste. Low-sodium or reduced-sodium canned vegetables.  Fruits  All fresh, dried, or frozen fruit. Canned fruit in natural juice (without added sugar).  Meat and other protein foods  Skinless chicken or turkey. Ground chicken or turkey. Pork with fat trimmed off. Fish and seafood. Egg whites. Dried beans, peas, or lentils. Unsalted nuts, nut butters, and seeds. Unsalted canned beans. Lean cuts of beef with fat trimmed off. Low-sodium, lean deli meat.  Dairy  Low-fat (1%) or fat-free (skim) milk. Fat-free, low-fat, or reduced-fat cheeses. Nonfat, low-sodium ricotta or cottage cheese. Low-fat or nonfat yogurt. Low-fat, low-sodium cheese.  Fats and oils  Soft margarine without trans fats. Vegetable oil. Low-fat, reduced-fat, or light mayonnaise and salad dressings (reduced-sodium). Canola, safflower, olive, soybean, and sunflower oils. Avocado.  Seasoning and other foods  Herbs. Spices. Seasoning  mixes without salt. Unsalted popcorn and pretzels. Fat-free sweets.  What foods are not recommended?  The items listed may not be a complete list. Talk with your dietitian about what dietary choices are best for you.  Grains  Baked goods made with fat, such as croissants, muffins, or some breads. Dry pasta or rice meal packs.  Vegetables  Creamed or fried vegetables. Vegetables in a cheese sauce. Regular canned vegetables (not low-sodium or reduced-sodium). Regular canned tomato sauce and paste (not low-sodium or reduced-sodium). Regular tomato and vegetable juice (not low-sodium or reduced-sodium). Pickles. Olives.  Fruits  Canned fruit in a light or heavy syrup. Fried fruit. Fruit in cream or butter sauce.  Meat and other protein foods  Fatty cuts of meat. Ribs. Fried meat. Schafer. Sausage. Bologna and other processed lunch meats. Salami. Fatback. Hotdogs. Bratwurst. Salted nuts and seeds. Canned beans with added salt. Canned or smoked fish. Whole eggs or egg yolks. Chicken or turkey with skin.  Dairy  Whole or 2% milk, cream, and half-and-half. Whole or full-fat cream cheese. Whole-fat or sweetened yogurt. Full-fat cheese. Nondairy creamers. Whipped toppings. Processed cheese and cheese spreads.  Fats and oils  Butter. Stick margarine. Lard. Shortening. Ghee. Schafer fat. Tropical oils, such as coconut, palm kernel, or palm oil.  Seasoning and other foods  Salted popcorn and pretzels. Onion salt, garlic salt, seasoned salt, table salt, and sea salt. Worcestershire sauce. Tartar sauce. Barbecue sauce. Teriyaki sauce. Soy sauce, including reduced-sodium. Steak sauce. Canned and packaged gravies. Fish sauce. Oyster sauce. Cocktail sauce. Horseradish that you find on the shelf. Ketchup. Mustard. Meat flavorings and tenderizers. Bouillon cubes. Hot sauce and Tabasco sauce. Premade or packaged marinades. Premade or packaged taco seasonings. Relishes. Regular salad dressings.  Where to find more information:  · National  Heart, Lung, and Blood [place]: www.nhlbi.nih.gov  · American Heart Association: www.heart.org  Summary  · The DASH eating plan is a healthy eating plan that has been shown to reduce high blood pressure (hypertension). It may also reduce your risk for type 2 diabetes, heart disease, and stroke.  · With the DASH eating plan, you should limit salt (sodium) intake to 2,300 mg a day. If you have hypertension, you may need to reduce your sodium intake to 1,500 mg a day.  · When on the DASH eating plan, aim to eat more fresh fruits and vegetables, whole grains, lean proteins, low-fat dairy, and heart-healthy fats.  · Work with your health care provider or diet and nutrition specialist (dietitian) to adjust your eating plan to your individual calorie needs.  This information is not intended to replace advice given to you by your health care provider. Make sure you discuss any questions you have with your health care provider.  Document Revised: 11/30/2018 Document Reviewed: 12/11/2017  [employer] Patient Education © 2020 [employer]

## 2021-04-30 ENCOUNTER — APPOINTMENT (OUTPATIENT)
Dept: PREADMISSION TESTING | Facility: HOSPITAL | Age: 71
End: 2021-04-30

## 2021-04-30 ENCOUNTER — PRE-ADMISSION TESTING (OUTPATIENT)
Dept: PREADMISSION TESTING | Facility: HOSPITAL | Age: 71
End: 2021-04-30

## 2021-04-30 VITALS — WEIGHT: 246 LBS | BODY MASS INDEX: 34.44 KG/M2 | HEIGHT: 71 IN

## 2021-04-30 LAB
ANION GAP SERPL CALCULATED.3IONS-SCNC: 9 MMOL/L (ref 5–15)
BUN SERPL-MCNC: 15 MG/DL (ref 8–23)
BUN/CREAT SERPL: 17 (ref 7–25)
CALCIUM SPEC-SCNC: 9 MG/DL (ref 8.6–10.5)
CHLORIDE SERPL-SCNC: 104 MMOL/L (ref 98–107)
CO2 SERPL-SCNC: 29 MMOL/L (ref 22–29)
CREAT SERPL-MCNC: 0.88 MG/DL (ref 0.76–1.27)
DEPRECATED RDW RBC AUTO: 42.6 FL (ref 37–54)
ERYTHROCYTE [DISTWIDTH] IN BLOOD BY AUTOMATED COUNT: 13 % (ref 12.3–15.4)
GFR SERPL CREATININE-BSD FRML MDRD: 85 ML/MIN/1.73
GLUCOSE SERPL-MCNC: 157 MG/DL (ref 65–99)
HCT VFR BLD AUTO: 43 % (ref 37.5–51)
HGB BLD-MCNC: 13.8 G/DL (ref 13–17.7)
MCH RBC QN AUTO: 28.6 PG (ref 26.6–33)
MCHC RBC AUTO-ENTMCNC: 32.1 G/DL (ref 31.5–35.7)
MCV RBC AUTO: 89.2 FL (ref 79–97)
PLATELET # BLD AUTO: 196 10*3/MM3 (ref 140–450)
PMV BLD AUTO: 10.4 FL (ref 6–12)
POTASSIUM SERPL-SCNC: 4 MMOL/L (ref 3.5–5.2)
RBC # BLD AUTO: 4.82 10*6/MM3 (ref 4.14–5.8)
SODIUM SERPL-SCNC: 142 MMOL/L (ref 136–145)
WBC # BLD AUTO: 3.64 10*3/MM3 (ref 3.4–10.8)

## 2021-04-30 PROCEDURE — 36415 COLL VENOUS BLD VENIPUNCTURE: CPT

## 2021-04-30 PROCEDURE — U0004 COV-19 TEST NON-CDC HGH THRU: HCPCS

## 2021-04-30 PROCEDURE — 80048 BASIC METABOLIC PNL TOTAL CA: CPT

## 2021-04-30 PROCEDURE — C9803 HOPD COVID-19 SPEC COLLECT: HCPCS

## 2021-04-30 PROCEDURE — 85027 COMPLETE CBC AUTOMATED: CPT

## 2021-04-30 RX ORDER — ASPIRIN 81 MG/1
81 TABLET, CHEWABLE ORAL DAILY
COMMUNITY

## 2021-05-01 LAB — SARS-COV-2 RNA NOSE QL NAA+PROBE: NOT DETECTED

## 2021-05-02 ENCOUNTER — ANESTHESIA EVENT (OUTPATIENT)
Dept: PERIOP | Facility: HOSPITAL | Age: 71
End: 2021-05-02

## 2021-05-02 RX ORDER — FAMOTIDINE 10 MG/ML
20 INJECTION, SOLUTION INTRAVENOUS ONCE
Status: CANCELLED | OUTPATIENT
Start: 2021-05-02 | End: 2021-05-02

## 2021-05-03 ENCOUNTER — ANESTHESIA (OUTPATIENT)
Dept: PERIOP | Facility: HOSPITAL | Age: 71
End: 2021-05-03

## 2021-05-03 ENCOUNTER — HOSPITAL ENCOUNTER (OUTPATIENT)
Facility: HOSPITAL | Age: 71
Setting detail: HOSPITAL OUTPATIENT SURGERY
Discharge: HOME | End: 2021-05-03
Attending: SURGERY | Admitting: SURGERY

## 2021-05-03 ENCOUNTER — APPOINTMENT (OUTPATIENT)
Dept: GENERAL RADIOLOGY | Facility: HOSPITAL | Age: 71
End: 2021-05-03

## 2021-05-03 VITALS
BODY MASS INDEX: 34.44 KG/M2 | HEART RATE: 56 BPM | HEIGHT: 71 IN | WEIGHT: 246 LBS | OXYGEN SATURATION: 97 % | DIASTOLIC BLOOD PRESSURE: 86 MMHG | SYSTOLIC BLOOD PRESSURE: 152 MMHG | TEMPERATURE: 98.1 F | RESPIRATION RATE: 16 BRPM

## 2021-05-03 PROCEDURE — C1773 RET DEV, INSERTABLE: HCPCS | Performed by: SURGERY

## 2021-05-03 PROCEDURE — C1894 INTRO/SHEATH, NON-LASER: HCPCS | Performed by: SURGERY

## 2021-05-03 PROCEDURE — C1760 CLOSURE DEV, VASC: HCPCS | Performed by: SURGERY

## 2021-05-03 PROCEDURE — 0 IODIXANOL PER 1 ML: Performed by: SURGERY

## 2021-05-03 PROCEDURE — C1769 GUIDE WIRE: HCPCS | Performed by: SURGERY

## 2021-05-03 PROCEDURE — 25010000002 HEPARIN (PORCINE) PER 1000 UNITS: Performed by: NURSE ANESTHETIST, CERTIFIED REGISTERED

## 2021-05-03 PROCEDURE — 25010000002 FENTANYL CITRATE (PF) 100 MCG/2ML SOLUTION: Performed by: NURSE ANESTHETIST, CERTIFIED REGISTERED

## 2021-05-03 PROCEDURE — 25010000002 HEPARIN (PORCINE) PER 1000 UNITS: Performed by: SURGERY

## 2021-05-03 PROCEDURE — C1725 CATH, TRANSLUMIN NON-LASER: HCPCS | Performed by: SURGERY

## 2021-05-03 PROCEDURE — 75710 ARTERY X-RAYS ARM/LEG: CPT

## 2021-05-03 PROCEDURE — 25010000002 PROPOFOL 10 MG/ML EMULSION: Performed by: NURSE ANESTHETIST, CERTIFIED REGISTERED

## 2021-05-03 PROCEDURE — C1887 CATHETER, GUIDING: HCPCS | Performed by: SURGERY

## 2021-05-03 PROCEDURE — C1753 CATH, INTRAVAS ULTRASOUND: HCPCS | Performed by: SURGERY

## 2021-05-03 PROCEDURE — 25010000003 LIDOCAINE 1 % SOLUTION: Performed by: SURGERY

## 2021-05-03 PROCEDURE — C1874 STENT, COATED/COV W/DEL SYS: HCPCS | Performed by: SURGERY

## 2021-05-03 PROCEDURE — 25010000002 PROTAMINE SULFATE PER 10 MG: Performed by: NURSE ANESTHETIST, CERTIFIED REGISTERED

## 2021-05-03 DEVICE — STENTGR ENDOPROSTH VIABAHN RO HEP 7F 8MM 10X120CM: Type: IMPLANTABLE DEVICE | Site: LEG | Status: FUNCTIONAL

## 2021-05-03 RX ORDER — PROPOFOL 10 MG/ML
VIAL (ML) INTRAVENOUS AS NEEDED
Status: DISCONTINUED | OUTPATIENT
Start: 2021-05-03 | End: 2021-05-03 | Stop reason: SURG

## 2021-05-03 RX ORDER — IPRATROPIUM BROMIDE AND ALBUTEROL SULFATE 2.5; .5 MG/3ML; MG/3ML
3 SOLUTION RESPIRATORY (INHALATION) ONCE AS NEEDED
Status: DISCONTINUED | OUTPATIENT
Start: 2021-05-03 | End: 2021-05-03 | Stop reason: HOSPADM

## 2021-05-03 RX ORDER — SODIUM CHLORIDE 0.9 % (FLUSH) 0.9 %
10 SYRINGE (ML) INJECTION EVERY 12 HOURS SCHEDULED
Status: DISCONTINUED | OUTPATIENT
Start: 2021-05-03 | End: 2021-05-03 | Stop reason: HOSPADM

## 2021-05-03 RX ORDER — FENTANYL CITRATE 50 UG/ML
50 INJECTION, SOLUTION INTRAMUSCULAR; INTRAVENOUS
Status: DISCONTINUED | OUTPATIENT
Start: 2021-05-03 | End: 2021-05-03 | Stop reason: HOSPADM

## 2021-05-03 RX ORDER — HYDROCODONE BITARTRATE AND ACETAMINOPHEN 5; 325 MG/1; MG/1
1 TABLET ORAL EVERY 4 HOURS PRN
Status: CANCELLED | OUTPATIENT
Start: 2021-05-03 | End: 2021-05-10

## 2021-05-03 RX ORDER — PROTAMINE SULFATE 10 MG/ML
INJECTION, SOLUTION INTRAVENOUS AS NEEDED
Status: DISCONTINUED | OUTPATIENT
Start: 2021-05-03 | End: 2021-05-03 | Stop reason: SURG

## 2021-05-03 RX ORDER — MIDAZOLAM HYDROCHLORIDE 1 MG/ML
1 INJECTION INTRAMUSCULAR; INTRAVENOUS
Status: DISCONTINUED | OUTPATIENT
Start: 2021-05-03 | End: 2021-05-03 | Stop reason: HOSPADM

## 2021-05-03 RX ORDER — LABETALOL HYDROCHLORIDE 5 MG/ML
5 INJECTION, SOLUTION INTRAVENOUS
Status: DISCONTINUED | OUTPATIENT
Start: 2021-05-03 | End: 2021-05-03 | Stop reason: HOSPADM

## 2021-05-03 RX ORDER — MIDAZOLAM HYDROCHLORIDE 1 MG/ML
0.5 INJECTION INTRAMUSCULAR; INTRAVENOUS
Status: DISCONTINUED | OUTPATIENT
Start: 2021-05-03 | End: 2021-05-03 | Stop reason: HOSPADM

## 2021-05-03 RX ORDER — ONDANSETRON 2 MG/ML
4 INJECTION INTRAMUSCULAR; INTRAVENOUS ONCE AS NEEDED
Status: DISCONTINUED | OUTPATIENT
Start: 2021-05-03 | End: 2021-05-03 | Stop reason: HOSPADM

## 2021-05-03 RX ORDER — SODIUM CHLORIDE, SODIUM LACTATE, POTASSIUM CHLORIDE, CALCIUM CHLORIDE 600; 310; 30; 20 MG/100ML; MG/100ML; MG/100ML; MG/100ML
9 INJECTION, SOLUTION INTRAVENOUS CONTINUOUS
Status: DISCONTINUED | OUTPATIENT
Start: 2021-05-03 | End: 2021-05-03 | Stop reason: HOSPADM

## 2021-05-03 RX ORDER — HEPARIN SODIUM 1000 [USP'U]/ML
INJECTION, SOLUTION INTRAVENOUS; SUBCUTANEOUS AS NEEDED
Status: DISCONTINUED | OUTPATIENT
Start: 2021-05-03 | End: 2021-05-03 | Stop reason: SURG

## 2021-05-03 RX ORDER — SODIUM CHLORIDE 0.9 % (FLUSH) 0.9 %
10 SYRINGE (ML) INJECTION AS NEEDED
Status: DISCONTINUED | OUTPATIENT
Start: 2021-05-03 | End: 2021-05-03 | Stop reason: HOSPADM

## 2021-05-03 RX ORDER — EPHEDRINE SULFATE 50 MG/ML
INJECTION, SOLUTION INTRAVENOUS AS NEEDED
Status: DISCONTINUED | OUTPATIENT
Start: 2021-05-03 | End: 2021-05-03 | Stop reason: SURG

## 2021-05-03 RX ORDER — FAMOTIDINE 20 MG/1
20 TABLET, FILM COATED ORAL ONCE
Status: COMPLETED | OUTPATIENT
Start: 2021-05-03 | End: 2021-05-03

## 2021-05-03 RX ORDER — NALOXONE HCL 0.4 MG/ML
0.4 VIAL (ML) INJECTION
Status: CANCELLED | OUTPATIENT
Start: 2021-05-03

## 2021-05-03 RX ORDER — IODIXANOL 320 MG/ML
INJECTION, SOLUTION INTRAVASCULAR AS NEEDED
Status: DISCONTINUED | OUTPATIENT
Start: 2021-05-03 | End: 2021-05-03 | Stop reason: HOSPADM

## 2021-05-03 RX ORDER — LIDOCAINE HYDROCHLORIDE 10 MG/ML
INJECTION, SOLUTION EPIDURAL; INFILTRATION; INTRACAUDAL; PERINEURAL AS NEEDED
Status: DISCONTINUED | OUTPATIENT
Start: 2021-05-03 | End: 2021-05-03 | Stop reason: SURG

## 2021-05-03 RX ORDER — LIDOCAINE HYDROCHLORIDE 10 MG/ML
0.5 INJECTION, SOLUTION EPIDURAL; INFILTRATION; INTRACAUDAL; PERINEURAL ONCE AS NEEDED
Status: COMPLETED | OUTPATIENT
Start: 2021-05-03 | End: 2021-05-03

## 2021-05-03 RX ORDER — LIDOCAINE HYDROCHLORIDE 10 MG/ML
INJECTION, SOLUTION INFILTRATION; PERINEURAL AS NEEDED
Status: DISCONTINUED | OUTPATIENT
Start: 2021-05-03 | End: 2021-05-03 | Stop reason: HOSPADM

## 2021-05-03 RX ORDER — FENTANYL CITRATE 50 UG/ML
INJECTION, SOLUTION INTRAMUSCULAR; INTRAVENOUS AS NEEDED
Status: DISCONTINUED | OUTPATIENT
Start: 2021-05-03 | End: 2021-05-03 | Stop reason: SURG

## 2021-05-03 RX ADMIN — PROPOFOL 20 MG: 10 INJECTION, EMULSION INTRAVENOUS at 11:02

## 2021-05-03 RX ADMIN — FENTANYL CITRATE 25 MCG: 50 INJECTION, SOLUTION INTRAMUSCULAR; INTRAVENOUS at 11:05

## 2021-05-03 RX ADMIN — EPHEDRINE SULFATE 10 MG: 50 INJECTION, SOLUTION INTRAVENOUS at 11:41

## 2021-05-03 RX ADMIN — PROPOFOL 20 MG: 10 INJECTION, EMULSION INTRAVENOUS at 11:00

## 2021-05-03 RX ADMIN — PROTAMINE SULFATE 20 MG: 10 INJECTION, SOLUTION INTRAVENOUS at 12:26

## 2021-05-03 RX ADMIN — LIDOCAINE HYDROCHLORIDE 0.5 ML: 10 INJECTION, SOLUTION EPIDURAL; INFILTRATION; INTRACAUDAL; PERINEURAL at 08:43

## 2021-05-03 RX ADMIN — FAMOTIDINE 20 MG: 20 TABLET, FILM COATED ORAL at 08:42

## 2021-05-03 RX ADMIN — FENTANYL CITRATE 50 MCG: 50 INJECTION, SOLUTION INTRAMUSCULAR; INTRAVENOUS at 10:57

## 2021-05-03 RX ADMIN — EPHEDRINE SULFATE 5 MG: 50 INJECTION, SOLUTION INTRAVENOUS at 11:22

## 2021-05-03 RX ADMIN — PROPOFOL 200 MCG/KG/MIN: 10 INJECTION, EMULSION INTRAVENOUS at 10:58

## 2021-05-03 RX ADMIN — SODIUM CHLORIDE, POTASSIUM CHLORIDE, SODIUM LACTATE AND CALCIUM CHLORIDE 9 ML/HR: 600; 310; 30; 20 INJECTION, SOLUTION INTRAVENOUS at 08:42

## 2021-05-03 RX ADMIN — EPHEDRINE SULFATE 10 MG: 50 INJECTION, SOLUTION INTRAVENOUS at 11:32

## 2021-05-03 RX ADMIN — HEPARIN SODIUM 9000 UNITS: 1000 INJECTION, SOLUTION INTRAVENOUS; SUBCUTANEOUS at 11:29

## 2021-05-03 RX ADMIN — EPHEDRINE SULFATE 5 MG: 50 INJECTION, SOLUTION INTRAVENOUS at 11:20

## 2021-05-03 RX ADMIN — FENTANYL CITRATE 25 MCG: 50 INJECTION, SOLUTION INTRAMUSCULAR; INTRAVENOUS at 11:00

## 2021-05-03 RX ADMIN — LIDOCAINE HYDROCHLORIDE 50 MG: 10 INJECTION, SOLUTION EPIDURAL; INFILTRATION; INTRACAUDAL; PERINEURAL at 10:58

## 2021-05-03 NOTE — ANESTHESIA PREPROCEDURE EVALUATION
Anesthesia Evaluation     Patient summary reviewed and Nursing notes reviewed   NPO Solid Status: > 8 hours  NPO Liquid Status: > 2 hours           Airway   Mallampati: I  TM distance: >3 FB  Neck ROM: full  No difficulty expected  Dental      Pulmonary     breath sounds clear to auscultation  Cardiovascular     ECG reviewed  Rhythm: regular  Rate: normal    (+) hypertension, dysrhythmias Atrial Fib, PVD, hyperlipidemia,       Neuro/Psych  GI/Hepatic/Renal/Endo    (+) obesity,       Musculoskeletal     Abdominal    Substance History      OB/GYN          Other   arthritis,                    Anesthesia Plan    ASA 3     general     intravenous induction     Anesthetic plan, all risks, benefits, and alternatives have been provided, discussed and informed consent has been obtained with: patient.    Plan discussed with CRNA.

## 2021-05-03 NOTE — ANESTHESIA POSTPROCEDURE EVALUATION
Patient: Orlando Wylie    Procedure Summary     Date: 05/03/21 Room / Location: [facility] OR 02 / [facility] HYBRID DEMETRIUS    Anesthesia Start: 1055 Anesthesia Stop:     Procedure: LEFT COMMON FEMORAL, RIGHT COMMON FEMORAL, AND RIGHT POSTERIOR TIBIAL ACCESS WITH AORTAGRAM, RIGHT LEG ANGIOGRAM, RIGHT POPLITEAL ARTERY ANGIOPLASTY AND STENT, IVUS OF RIGHT SUPERFICIAL FEMORAL AND POPLITEAL ARTERIES (N/A Abdomen) Diagnosis:     Surgeons: Paulo Mcduffie MD Provider: Yoni Figueroa Jr., MD    Anesthesia Type: general ASA Status: 3          Anesthesia Type: general    Vitals  No vitals data found for the desired time range.          Post Anesthesia Care and Evaluation    Patient location during evaluation: PACU  Patient participation: complete - patient participated  Level of consciousness: awake and alert  Pain management: adequate  Airway patency: patent  Anesthetic complications: No anesthetic complications  PONV Status: none  Cardiovascular status: hemodynamically stable and acceptable  Respiratory status: nonlabored ventilation, acceptable and nasal cannula  Hydration status: acceptable

## 2021-05-04 RX ORDER — LOSARTAN POTASSIUM AND HYDROCHLOROTHIAZIDE 25; 100 MG/1; MG/1
TABLET ORAL
Qty: 90 TABLET | Refills: 0 | Status: SHIPPED | OUTPATIENT
Start: 2021-05-04 | End: 2022-01-25

## 2021-05-17 DIAGNOSIS — I48.0 PAROXYSMAL ATRIAL FIBRILLATION (HCC): ICD-10-CM

## 2021-06-09 RX ORDER — DRONEDARONE 400 MG/1
TABLET, FILM COATED ORAL
Qty: 60 TABLET | Refills: 4 | Status: SHIPPED | OUTPATIENT
Start: 2021-06-09 | End: 2021-10-14

## 2021-09-09 ENCOUNTER — OFFICE VISIT (OUTPATIENT)
Dept: CARDIOLOGY | Facility: CLINIC | Age: 71
End: 2021-09-09

## 2021-09-09 VITALS
TEMPERATURE: 97.4 F | HEIGHT: 71 IN | SYSTOLIC BLOOD PRESSURE: 120 MMHG | WEIGHT: 240 LBS | HEART RATE: 52 BPM | BODY MASS INDEX: 33.6 KG/M2 | DIASTOLIC BLOOD PRESSURE: 72 MMHG

## 2021-09-09 DIAGNOSIS — Z79.899 LONG TERM CURRENT USE OF ANTIARRHYTHMIC MEDICAL THERAPY: ICD-10-CM

## 2021-09-09 DIAGNOSIS — E78.00 HYPERCHOLESTEREMIA: ICD-10-CM

## 2021-09-09 DIAGNOSIS — Z79.01 CURRENT USE OF LONG TERM ANTICOAGULATION: ICD-10-CM

## 2021-09-09 DIAGNOSIS — R00.1 BRADYCARDIA, SINUS: ICD-10-CM

## 2021-09-09 DIAGNOSIS — I48.0 PAROXYSMAL ATRIAL FIBRILLATION (HCC): Primary | ICD-10-CM

## 2021-09-09 DIAGNOSIS — I10 ESSENTIAL HYPERTENSION: ICD-10-CM

## 2021-09-09 PROCEDURE — 93000 ELECTROCARDIOGRAM COMPLETE: CPT | Performed by: NURSE PRACTITIONER

## 2021-09-09 PROCEDURE — 99213 OFFICE O/P EST LOW 20 MIN: CPT | Performed by: NURSE PRACTITIONER

## 2021-09-09 RX ORDER — ACETAZOLAMIDE 250 MG/1
250 TABLET ORAL DAILY
COMMUNITY

## 2021-09-09 RX ORDER — TRIPROLIDINE/PSEUDOEPHEDRINE 2.5MG-60MG
1 TABLET ORAL 2 TIMES DAILY
COMMUNITY

## 2021-09-09 NOTE — PROGRESS NOTES
Chief Complaint   Patient presents with   • Follow-up     Cardiac management . Had cataract removed from both eyes and had stenting to right leg . Dr. Bates wants patient to start taking baby Aspirin daily   • Atrial Fibrillation     Reports he has noticed any  episodes of AFIB .   • LABS     Current labs June 2021 results on chart   • Med Refill     No refills needed today. Had med list today.       Subjective       Orlando Wylie is a 70 y.o. male with HTN, hyperlipidemia, and PAF diagnosed in 2012 when he underwent cardioversion. In September 2016, he saw Dr. Law to establish cardiac care. He had seen another cardiologist prior, had workup and was told everything was normal. His main concern was being on amiodarone for over 3 years. Since his LV function was normal and no ischemia was found, it was decided to stop amiodarone and flecainide was started. EKG has remained sinus.  Later he had more atrial fibrillation and Xarelto therapy started.  Repeat stress and echo shows mildly diminished LV function but no ischemia.  On 12/4/2020 he underwent successful cardioversion and later experienced bradycardia for which Lanoxin was stopped and Lopressor decreased.  Flecainide had been changed to Multaq therapy.  Follow-up EKG was stable showing sinus bradycardia with heart rate of 53 bpm.  In January 2021 cardiac clearance was given for total knee revision.  He had postoperative complications and underwent physical therapy at [facility].    Today comes the office for follow-up visit.  He underwent stenting of his right leg and low-dose baby aspirin added. Xarelto continued.  No signs of bleeding noted.  He is tolerating well.  Plan is to start physical therapy to strengthen right leg muscles and he asked about exercise information.  From a heart standpoint he denies chest pain or recent palpitations.  Resting heart rate most often in the 50s without symptoms.  Blood pressure at home has been stable but has noticed  blood pressure monitor will fluctuate about 10 points.  No recent change in medications noted.        Cardiac History:    Past Surgical History:   Procedure Laterality Date   • AORTAGRAM N/A 5/3/2021    Procedure: COMMON FEMORAL LEFT, RIGHT COMMON FEMORAL, AND RIGHT POSTERIOR TIBIAL ACCESS WITH AORTAGRAM, RIGHT LEG ANGIOGRAM, RIGHT POPLITEAL ARTERY ANGIOPLASTY AND STENT, IVUS OF RIGHT SUPERFICIAL FEMORAL AND POPLITEAL ARTERIES;  Surgeon: Paulo Mcduffie MD;  Location: [facility];  Service: Vascular;  Laterality: N/A;  FLOURO- 11 min, 30 sec  DOSE- 35 mL visi  MGY- 34     • CARDIOVASCULAR STRESS TEST  09/01/2015    @[email or web address].L. Myoview- Neagtive.   • CARDIOVASCULAR STRESS TEST  08/31/2020    3 Min. 15 Secs. 4.6 METS. A.Fib RVR. 113% THR. BP- 153/95. EF 45%. Negative.   • CARDIOVERSION  12/04/2020    Cardioverted to Sinus   • DISTAL POPLITEAL ARTERY BYPASS  10/13/2020    below the knee   • ECHO - CONVERTED  09/01/2015    @[facility]. Dr. Parker- EF 65%. Mild MR and AI   • ECHO - CONVERTED  08/31/2020    EF 55%. LA- 5.1 Cm.Mod MR. Mild AI. RVSP- 33 mmHG. (A.Fib)   • KNEE ARTHROPLASTY Bilateral    • OTHER SURGICAL HISTORY  12/10/2020    Pulse O2- 84 Min of Hyoxia.   • TONSILLECTOMY     • TOTAL KNEE ARTHROPLASTY REVISION Bilateral    • TRANSESOPHAGEAL ECHOCARDIOGRAM (SANDRITA) AND CARDIOVERSION  11/05/2012    Dr. MEDELLIN       Current Outpatient Medications   Medication Sig Dispense Refill   • acetaZOLAMIDE (DIAMOX) 250 MG tablet Take 250 mg by mouth Daily.     • amLODIPine (NORVASC) 5 MG tablet Take 5 mg by mouth Every Night.     • aspirin 81 MG chewable tablet Chew 81 mg Daily.     • difluprednate (Durezol) 0.05 % ophthalmic emulsion Administer 1 drop into the left eye 2 (two) times a day.     • doxazosin (CARDURA) 4 MG tablet Take 4 mg by mouth Every Night.     • losartan-hydrochlorothiazide (HYZAAR) 100-25 MG per tablet TAKE ONE TABLET BY MOUTH DAILY 90 tablet 0   • metoprolol tartrate (LOPRESSOR) 50 MG tablet Take 50 mg by  mouth 2 (Two) Times a Day.     • Multaq 400 MG tablet TAKE ONE TABLET BY MOUTH TWICE A DAY 60 tablet 4   • rivaroxaban (XARELTO) 20 MG tablet Take 1 tablet by mouth Daily. 30 tablet 8   • rosuvastatin (CRESTOR) 5 MG tablet Take 5 mg by mouth Daily. Takes 1 tablet every other day       No current facility-administered medications for this visit.       Patient has no known allergies.    Past Medical History:   Diagnosis Date   • Arthritis    • BPH (benign prostatic hyperplasia)    • Cataract    • Diverticulosis    • Exogenous obesity    • H/O prostate biopsy 06/29/2010    negative findings   • History of colon polyps    • History of knee replacement    • History of left knee replacement 06/25/2019   • [place] (hard of hearing)     hearing aids   • Hyperlipidemia    • Hypertension    • PAF (paroxysmal atrial fibrillation) (CMS/HCC)    • PVD (peripheral vascular disease) (CMS/HCC)    • Sleep apnea     no cpap   • Varicose veins        Social History     Socioeconomic History   • Marital status:      Spouse name: Not on file   • Number of children: Not on file   • Years of education: Not on file   • Highest education level: Not on file   Tobacco Use   • Smoking status: Never Smoker   • Smokeless tobacco: Never Used   Vaping Use   • Vaping Use: Never used   Substance and Sexual Activity   • Alcohol use: Yes     Comment: occasional beer   • Drug use: No   • Sexual activity: Defer       Family History   Problem Relation Age of Onset   • Atrial fibrillation Mother    • No Known Problems Father    • Hypertension Brother    • No Known Problems Daughter    • No Known Problems Son        Review of Systems   Constitutional: Negative for decreased appetite, diaphoresis and malaise/fatigue.   HENT: Negative for nosebleeds.    Eyes: Negative for blurred vision.   Cardiovascular: Negative for chest pain, claudication, cyanosis, dyspnea on exertion, irregular heartbeat, leg swelling, near-syncope, orthopnea, palpitations,  "paroxysmal nocturnal dyspnea and syncope.   Respiratory: Negative for shortness of breath and snoring.    Endocrine: Negative for cold intolerance and heat intolerance.   Hematologic/Lymphatic: Does not bruise/bleed easily.   Skin: Negative for rash.   Musculoskeletal: Positive for muscle weakness (right leg) and stiffness. Negative for falls and myalgias.   Gastrointestinal: Negative for heartburn, melena and nausea.   Genitourinary: Negative for dysuria and hematuria.   Neurological: Positive for loss of balance. Negative for dizziness and light-headedness.   Psychiatric/Behavioral: The patient does not have insomnia and is not nervous/anxious.         BP Readings from Last 5 Encounters:   09/09/21 120/72   05/03/21 152/86   03/04/21 120/62   11/19/20 140/90   08/18/20 138/78       Wt Readings from Last 5 Encounters:   09/09/21 109 kg (240 lb)   05/03/21 112 kg (246 lb)   04/30/21 112 kg (246 lb)   03/04/21 111 kg (244 lb 6.4 oz)   11/19/20 111 kg (244 lb)       Objective      Labs 6/2/21 per PCP: Glucose 82, BUN 12, creatinine 0.8, sodium 140, potassium 4.4, chloride 101, CO2 33, calcium 9.2, total protein 6.7, albumin 3.9, AST 25, ALT 12, ALP 72, total bili 0.7, , total cholesterol 139, triglycerides 61, HDL 46, LDL 81, TSH 2.32    Labs 11/25/2020: WBC 5, RBC 4.75, hemoglobin 13.9, hematocrit 44.6, platelets 243, glucose 101, BUN 30, creatinine 0.8, sodium 141, potassium 4.9, chloride 102, calcium 9.6, GFR 96, AST 16, ALT 15, ALP 87, total cholesterol 163, triglyceride 77, HDL 45, , A1c 4.6    /72   Pulse 52   Temp 97.4 °F (36.3 °C)   Ht 180.3 cm (70.98\")   Wt 109 kg (240 lb)   BMI 33.49 kg/m²     Vitals and nursing note reviewed.   Eyes:      Pupils: Pupils are equal, round, and reactive to light.   HENT:      Head: Normocephalic.   Pulmonary:      Breath sounds: Normal breath sounds.   Cardiovascular:      Bradycardia present. Regular rhythm.   Edema:     Peripheral edema absent. "   Abdominal:      General: Bowel sounds are normal.      Palpations: Abdomen is soft.   Musculoskeletal: Normal range of motion.      Cervical back: Normal range of motion. Skin:     General: Skin is warm.   Neurological:      Mental Status: Alert and oriented to person, place, and time.            ECG 12 Lead    Date/Time: 9/9/2021 5:06 PM  Performed by: Madonna Horner APRN  Authorized by: Madonna Horner APRN   Comparison: compared with previous ECG from 3/4/2021  Similar to previous ECG  Rhythm: sinus bradycardia  BPM: 52  Comments:  ms,  ms                 Assessment/Plan   Diagnoses and all orders for this visit:    1. Paroxysmal atrial fibrillation (CMS/HCC) (Primary)  -     ECG 12 Lead    2. Bradycardia, sinus  -     ECG 12 Lead    3. Long term current use of antiarrhythmic medical therapy  -     ECG 12 Lead    4. Current use of long term anticoagulation    5. Essential hypertension    6. Hypercholesteremia      PAF-EKG today shows sinus bradycardia with heart rate of 52 bpm.  Patient is asymptomatic.  He denies recent palpitations.  He denies signs of bleeding.  Continue Multaq 400 mg twice a day and Xarelto 20 mg daily.  Since he had recent stent placement and right leg low-dose daily 81 mg aspirin added.  Agree to continue along with Xarelto therapy and continue to monitor for side effects.    Hypertension-blood pressure normal today.  Patient admits fluctuation in blood pressure readings per home monitor.  I advised him to take blood pressure cuff with him next visit with you to check for accuracy.    Hypercholesterolemia-thank you for sending copy of recent labs.  Lipids appear well controlled.  Continue Crestor 5 mg daily.      Patient's Body mass index is 33.49 kg/m². indicating that he is obese (BMI >30). Obesity-related health conditions include the following: hypertension and dyslipidemias. Obesity is improving with treatment. BMI is is above average; BMI management plan is  completed. We discussed portion control and increasing exercise..     Patient appears stable from a cardiac standpoint.  No cardiac work-up advised at this time.  He will continue to monitor vital signs and call for any concerns.  A 6-month follow-up visit scheduled.             Electronically signed by FARRAH Ryan,  September 9, 2021 17:17 EDT

## 2021-09-13 RX ORDER — LOSARTAN POTASSIUM 25 MG/1
TABLET ORAL
Qty: 90 TABLET | Refills: 2 | OUTPATIENT
Start: 2021-09-13

## 2021-10-13 ENCOUNTER — TELEPHONE (OUTPATIENT)
Dept: CARDIOLOGY | Facility: CLINIC | Age: 71
End: 2021-10-13

## 2021-10-13 NOTE — TELEPHONE ENCOUNTER
Okay. We will go over medication management tomorrow. He maybe having increased heart rate if he is not taking Lopressor.

## 2021-10-13 NOTE — TELEPHONE ENCOUNTER
Patient called said he has been having  Low BP , he has been holding BP meds for about 2 days before needing again. His HR has been elevated around 90.  He is coming in tomorrow for EKG.  Medication list is current

## 2021-10-14 ENCOUNTER — CLINICAL SUPPORT (OUTPATIENT)
Dept: CARDIOLOGY | Facility: CLINIC | Age: 71
End: 2021-10-14

## 2021-10-14 ENCOUNTER — TELEPHONE (OUTPATIENT)
Dept: CARDIOLOGY | Facility: CLINIC | Age: 71
End: 2021-10-14

## 2021-10-14 DIAGNOSIS — I48.0 PAROXYSMAL ATRIAL FIBRILLATION (HCC): Primary | ICD-10-CM

## 2021-10-14 DIAGNOSIS — Z79.899 LONG TERM CURRENT USE OF ANTIARRHYTHMIC MEDICAL THERAPY: ICD-10-CM

## 2021-10-14 PROCEDURE — 93000 ELECTROCARDIOGRAM COMPLETE: CPT | Performed by: NURSE PRACTITIONER

## 2021-10-14 NOTE — PROGRESS NOTES
Procedure     ECG 12 Lead    Date/Time: 10/14/2021 4:45 PM  Performed by: Madonna Horner APRN  Authorized by: Madonna Horner APRN   Comparison: compared with previous ECG from 3/4/2021  Comparison to previous ECG: Previous sinus lea  Rhythm: atrial fibrillation  BPM: 83

## 2021-10-14 NOTE — TELEPHONE ENCOUNTER
Patient is aware of medication adjustments . He will start Sotalol on Saturday and come in on Tuesday 10- for an EKG . He is aware to stop Metoprolol and Multaq when he starts  Sotalol and to continue Amlodipine and Hyzaar.

## 2021-10-14 NOTE — TELEPHONE ENCOUNTER
Please inform patient his EKG shows he is back in atrial fib. Continue Xarelto. Stop Multaq. Stop Metoprolol. Start Sotalol 80 mg bid. Continue amlodipine and hyzaar for BP management. EKG Monday. If patient agrees please send in prescription for Sotalol.   (in the past he had taken amiodarone but stopped due to potential side effects, then Flecainide but went into atrial fib)

## 2021-10-19 ENCOUNTER — TELEPHONE (OUTPATIENT)
Dept: CARDIOLOGY | Facility: CLINIC | Age: 71
End: 2021-10-19

## 2021-10-19 ENCOUNTER — CLINICAL SUPPORT (OUTPATIENT)
Dept: CARDIOLOGY | Facility: CLINIC | Age: 71
End: 2021-10-19

## 2021-10-19 DIAGNOSIS — I48.0 PAROXYSMAL ATRIAL FIBRILLATION (HCC): Primary | ICD-10-CM

## 2021-10-19 DIAGNOSIS — Z79.899 LONG TERM CURRENT USE OF ANTIARRHYTHMIC MEDICAL THERAPY: ICD-10-CM

## 2021-10-19 DIAGNOSIS — R00.1 BRADYCARDIA, SINUS: ICD-10-CM

## 2021-10-19 PROCEDURE — 93000 ELECTROCARDIOGRAM COMPLETE: CPT | Performed by: NURSE PRACTITIONER

## 2021-10-19 NOTE — TELEPHONE ENCOUNTER
Pt was aware when here for EKG to continue current dose of sotalol at 80 mg BID, resume full dose of Hyzaar, monitor heart rate and BP, call with any concerns.

## 2021-10-19 NOTE — TELEPHONE ENCOUNTER
Please inform patient EKG shows conversion to sinus rhythm. BP stable at 150/70. He had decreased dose of hyzaar. Advise to resume full dose tablet. Continue Sotalol at 80 mg bid. Monitor heart rate and call for any concerns.

## 2021-10-19 NOTE — PROGRESS NOTES
Procedure     ECG 12 Lead    Date/Time: 10/19/2021 9:46 AM  Performed by: Madonna Horner APRN  Authorized by: Madonna Horner APRN   Comparison: compared with previous ECG from 10/14/2021  Comparison to previous ECG: Atrial fib previous EKG, now sinus with normal OK and QTc intervals.   Rhythm: sinus bradycardia  BPM: 49

## 2021-11-24 RX ORDER — LOSARTAN POTASSIUM 25 MG/1
TABLET ORAL
Qty: 90 TABLET | Refills: 2 | Status: SHIPPED | OUTPATIENT
Start: 2021-11-24 | End: 2022-01-25

## 2022-01-10 RX ORDER — METOPROLOL TARTRATE 100 MG/1
TABLET ORAL
Qty: 90 TABLET | OUTPATIENT
Start: 2022-01-10

## 2022-01-25 RX ORDER — LOSARTAN POTASSIUM AND HYDROCHLOROTHIAZIDE 25; 100 MG/1; MG/1
TABLET ORAL
Qty: 90 TABLET | Refills: 0 | Status: SHIPPED | OUTPATIENT
Start: 2022-01-25 | End: 2022-05-04 | Stop reason: SDUPTHER

## 2022-02-01 DIAGNOSIS — I48.0 PAROXYSMAL ATRIAL FIBRILLATION: ICD-10-CM

## 2022-02-01 RX ORDER — RIVAROXABAN 20 MG/1
TABLET, FILM COATED ORAL
Qty: 30 TABLET | Refills: 8 | Status: SHIPPED | OUTPATIENT
Start: 2022-02-01 | End: 2022-11-15 | Stop reason: SDUPTHER

## 2022-03-18 NOTE — TELEPHONE ENCOUNTER
Advise to change norvasc to 1/2 tablet BID.
Clarification- Patient advised to change Norvasc to 1/2 tablet BID  
Patient advised to decrease Norvasc to 1/2 tablet BID  
Patient in office  For Blood pressure  Right -168/100  Left-164/98   Rechecked down to 158/98   Checks Blood pressure twice daily  at home.  
bowel surgery

## 2022-03-29 ENCOUNTER — TELEPHONE (OUTPATIENT)
Dept: CARDIOLOGY | Facility: CLINIC | Age: 72
End: 2022-03-29

## 2022-03-29 NOTE — TELEPHONE ENCOUNTER
Received fax from Dr. Fisher for cardiac clearance for patient to have a colonoscopy. Patient is on Xarelto and they are requesting to hold. According to our records, I do not see where patient has had any stenting. Patient has a history of PAF.       Fax [phone]

## 2022-04-20 ENCOUNTER — TELEPHONE (OUTPATIENT)
Dept: CARDIOLOGY | Facility: CLINIC | Age: 72
End: 2022-04-20

## 2022-04-20 ENCOUNTER — CLINICAL SUPPORT (OUTPATIENT)
Dept: CARDIOLOGY | Facility: CLINIC | Age: 72
End: 2022-04-20

## 2022-04-20 DIAGNOSIS — I48.0 PAROXYSMAL ATRIAL FIBRILLATION: Primary | ICD-10-CM

## 2022-04-20 DIAGNOSIS — Z79.899 LONG TERM CURRENT USE OF ANTIARRHYTHMIC MEDICAL THERAPY: ICD-10-CM

## 2022-04-20 PROCEDURE — 93000 ELECTROCARDIOGRAM COMPLETE: CPT | Performed by: NURSE PRACTITIONER

## 2022-04-20 NOTE — TELEPHONE ENCOUNTER
Patient was made aware to increase sotalol to 120 mg just for this evening. Patient states that he has already taken 80 mg of sotalol around 2 this afternoon. Patient was made aware to just take 1/2 tablet of 80 mg tonight. He was made aware to monitor his heart rate and rhythm and if he continues to have irregular heart rhythm to let us know.

## 2022-04-20 NOTE — PROGRESS NOTES
Procedure     ECG 12 Lead    Date/Time: 4/20/2022 4:35 PM  Performed by: Madonna Horner APRN  Authorized by: Madonna Horner APRN   Comparison: compared with previous ECG from 10/19/2021  Comparison to previous ECG: Previous sinus lea with heart rate 49 bpm  Rhythm: atrial fibrillation  BPM: 91

## 2022-04-20 NOTE — TELEPHONE ENCOUNTER
Please inform patient EKG shows atrial fib. Advise to take 120 mg sotalol this evening. Monitor heart rate and rhythm. I am hesitant to continue increased dose due to previous EKG showing sinus lea with heart rate 49 bpm. Call if he continues to have irregular heart rhythm.

## 2022-05-02 ENCOUNTER — OFFICE VISIT (OUTPATIENT)
Dept: CARDIOLOGY | Facility: CLINIC | Age: 72
End: 2022-05-02

## 2022-05-02 VITALS
SYSTOLIC BLOOD PRESSURE: 136 MMHG | BODY MASS INDEX: 35.7 KG/M2 | DIASTOLIC BLOOD PRESSURE: 70 MMHG | HEIGHT: 71 IN | HEART RATE: 76 BPM | WEIGHT: 255 LBS

## 2022-05-02 DIAGNOSIS — Z79.01 CURRENT USE OF LONG TERM ANTICOAGULATION: ICD-10-CM

## 2022-05-02 DIAGNOSIS — Z79.899 LONG TERM CURRENT USE OF ANTIARRHYTHMIC MEDICAL THERAPY: ICD-10-CM

## 2022-05-02 DIAGNOSIS — I10 ESSENTIAL HYPERTENSION: ICD-10-CM

## 2022-05-02 DIAGNOSIS — I48.0 PAROXYSMAL ATRIAL FIBRILLATION: Primary | ICD-10-CM

## 2022-05-02 DIAGNOSIS — E78.00 HYPERCHOLESTEREMIA: ICD-10-CM

## 2022-05-02 DIAGNOSIS — I34.0 MITRAL VALVE INSUFFICIENCY, UNSPECIFIED ETIOLOGY: ICD-10-CM

## 2022-05-02 PROCEDURE — 93000 ELECTROCARDIOGRAM COMPLETE: CPT | Performed by: NURSE PRACTITIONER

## 2022-05-02 PROCEDURE — 99214 OFFICE O/P EST MOD 30 MIN: CPT | Performed by: NURSE PRACTITIONER

## 2022-05-02 RX ORDER — DOXAZOSIN MESYLATE 4 MG/1
4 TABLET ORAL NIGHTLY
Qty: 90 TABLET | Refills: 3 | Status: SHIPPED | OUTPATIENT
Start: 2022-05-02

## 2022-05-02 RX ORDER — AMLODIPINE BESYLATE 5 MG/1
5 TABLET ORAL NIGHTLY
Qty: 90 TABLET | Refills: 3 | Status: SHIPPED | OUTPATIENT
Start: 2022-05-02 | End: 2022-11-15 | Stop reason: SDUPTHER

## 2022-05-02 NOTE — PROGRESS NOTES
Chief Complaint   Patient presents with   • Follow-up     Cardiac management   • Atrial Fibrillation     Reports he had 1 episode of AFIB last week, was the first he had recently   • LABS     Is due to have labs in June 2022 per PCP.   • Med Refill     Needs refills on Amlodipine ,Doxazosin  and Sotalol 90 day supply to [facility]. Had med list today       Subjective       Orlando Wylie is a 71 y.o. male with HTN, hyperlipidemia, and PAF diagnosed in 2012 when he underwent cardioversion. In September 2016, he saw Dr. Law to establish cardiac care. He had seen another cardiologist prior, had workup and was told everything was normal. His main concern was being on amiodarone for over 3 years. Since his LV function was normal and no ischemia was found, it was decided to stop amiodarone and flecainide was started. EKG has remained sinus.  Later he had more atrial fibrillation and Xarelto therapy started.  Repeat stress and echo shows mildly diminished LV function but no ischemia.  On 12/4/2020 he underwent successful cardioversion and later experienced bradycardia for which Lanoxin was stopped and Lopressor decreased.  Flecainide had been changed to Multaq therapy.  Follow-up EKG was stable showing sinus bradycardia with heart rate of 53 bpm.  In January 2021 cardiac clearance was given for total knee revision.  He had postoperative complications and underwent physical therapy at [facility].    Today he comes to the office for a follow up visit.  Last week he had an episode of atrial fibrillation and took an extra sotalol tablet after which his heart rate returned to normal.  He has noticed his blood pressures been slightly increased with systolic most often above 130.  He denies chest pain, increase shortness of breath, or fatigue.  At the end of the day he has some mild swelling in his ankles that resolves by morning.  He admits he has been taking sotalol 1/2 tablet twice daily.  Otherwise, no changes in  medication noted.    Cardiac History:    Past Surgical History:   Procedure Laterality Date   • AORTAGRAM N/A 5/3/2021    Procedure: COMMON FEMORAL LEFT, RIGHT COMMON FEMORAL, AND RIGHT POSTERIOR TIBIAL ACCESS WITH AORTAGRAM, RIGHT LEG ANGIOGRAM, RIGHT POPLITEAL ARTERY ANGIOPLASTY AND STENT, IVUS OF RIGHT SUPERFICIAL FEMORAL AND POPLITEAL ARTERIES;  Surgeon: Paulo Mcduffie MD;  Location: [facility];  Service: Vascular;  Laterality: N/A;  FLOURO- 11 min, 30 sec  DOSE- 35 mL visi  MGY- 34     • CARDIOVASCULAR STRESS TEST  09/01/2015    [email or web address]- Neagtive.   • CARDIOVASCULAR STRESS TEST  08/31/2020    3 Min. 15 Secs. 4.6 METS. A.Fib RVR. 113% THR. BP- 153/95. EF 45%. Negative.   • CARDIOVERSION  12/04/2020    Cardioverted to Sinus   • DISTAL POPLITEAL ARTERY BYPASS  10/13/2020    below the knee   • ECHO - CONVERTED  09/01/2015    @[email or web address]. Dr. Parker- EF 65%. Mild MR and AI   • ECHO - CONVERTED  08/31/2020    EF 55%. LA- 5.1 Cm.Mod MR. Mild AI. RVSP- 33 mmHG. (A.Fib)   • KNEE ARTHROPLASTY Bilateral    • OTHER SURGICAL HISTORY  12/10/2020    Pulse O2- 84 Min of Hyoxia.   • TONSILLECTOMY     • TOTAL KNEE ARTHROPLASTY REVISION Bilateral    • TRANSESOPHAGEAL ECHOCARDIOGRAM (SANDRITA) AND CARDIOVERSION  11/05/2012    Dr. MEDELLIN       Current Outpatient Medications   Medication Sig Dispense Refill   • acetaZOLAMIDE (DIAMOX) 250 MG tablet Take 250 mg by mouth Daily.     • amLODIPine (NORVASC) 5 MG tablet Take 1 tablet by mouth Every Night. 90 tablet 3   • aspirin 81 MG chewable tablet Chew 81 mg Daily.     • difluprednate (Durezol) 0.05 % ophthalmic emulsion Administer 1 drop into the left eye 2 (two) times a day.     • doxazosin (CARDURA) 4 MG tablet Take 1 tablet by mouth Every Night. 90 tablet 3   • losartan-hydrochlorothiazide (HYZAAR) 100-25 MG per tablet TAKE ONE TABLET BY MOUTH DAILY 90 tablet 0   • rosuvastatin (CRESTOR) 5 MG tablet Take 5 mg by mouth Daily. Takes 1 tablet every other day     • Xarelto 20 MG  tablet TAKE ONE TABLET BY MOUTH DAILY 30 tablet 8   • Sotalol HCl AF 80 MG tablet Take 1/2 tablet twice a day and take and extra 1/2 to 1 tablet if needed for episode of atrial fib 180 tablet 3     No current facility-administered medications for this visit.       Patient has no known allergies.    Past Medical History:   Diagnosis Date   • Arthritis    • BPH (benign prostatic hyperplasia)    • Cataract    • Diverticulosis    • Exogenous obesity    • H/O prostate biopsy 06/29/2010    negative findings   • History of colon polyps    • History of knee replacement    • History of left knee replacement 06/25/2019   • [place] (hard of hearing)     hearing aids   • Hyperlipidemia    • Hypertension    • PAF (paroxysmal atrial fibrillation) ([facility])    • PVD (peripheral vascular disease) ([facility])    • Sleep apnea     no cpap   • Varicose veins        Social History     Socioeconomic History   • Marital status:    Tobacco Use   • Smoking status: Never Smoker   • Smokeless tobacco: Never Used   Vaping Use   • Vaping Use: Never used   Substance and Sexual Activity   • Alcohol use: Yes     Comment: occasional beer   • Drug use: No   • Sexual activity: Defer       Family History   Problem Relation Age of Onset   • Atrial fibrillation Mother    • No Known Problems Father    • Hypertension Brother    • No Known Problems Daughter    • No Known Problems Son        Review of Systems   Constitutional: Negative for decreased appetite, diaphoresis and malaise/fatigue.   HENT: Negative for nosebleeds.    Eyes: Negative for blurred vision.   Cardiovascular: Positive for leg swelling and palpitations. Negative for chest pain, claudication, cyanosis, dyspnea on exertion, irregular heartbeat, near-syncope, orthopnea, paroxysmal nocturnal dyspnea and syncope.   Respiratory: Negative for shortness of breath.    Endocrine: Negative for cold intolerance and heat intolerance.   Hematologic/Lymphatic: Does not bruise/bleed easily.   Skin: Negative  "for rash.   Musculoskeletal: Negative for myalgias.   Gastrointestinal: Negative for heartburn, melena and nausea.   Genitourinary: Negative for dysuria and hematuria.   Neurological: Positive for loss of balance (relates to knee issues, uses cane for safety) and numbness (below right knee, r/t neuropathy). Negative for dizziness and light-headedness.   Psychiatric/Behavioral: The patient does not have insomnia and is not nervous/anxious.         BP Readings from Last 5 Encounters:   05/02/22 136/70   09/09/21 120/72   05/03/21 152/86   03/04/21 120/62   11/19/20 140/90       Wt Readings from Last 5 Encounters:   05/02/22 116 kg (255 lb)   09/09/21 109 kg (240 lb)   05/03/21 112 kg (246 lb)   04/30/21 112 kg (246 lb)   03/04/21 111 kg (244 lb 6.4 oz)       Objective      Labs 11/22/2021 per PCP: WBC 4.1, RBC 4.7, hemoglobin 14, hematocrit 42.8, platelets 201, glucose 98, BUN 21, creatinine 1.2, sodium 136, potassium 4.8, chloride 95, CO2 34, calcium 9.8, total protein 7.2, albumin 4.1, AST 22, ALT 12, ALP 85, total bili 1, GFR 62, total cholesterol 155, triglycerides 90, HDL 57, LDL 80    Labs 6/2/21 per PCP: Glucose 82, BUN 12, creatinine 0.8, sodium 140, potassium 4.4, chloride 101, CO2 33, calcium 9.2, total protein 6.7, albumin 3.9, AST 25, ALT 12, ALP 72, total bili 0.7, , total cholesterol 139, triglycerides 61, HDL 46, LDL 81, TSH 2.32     Labs 11/25/2020: WBC 5, RBC 4.75, hemoglobin 13.9, hematocrit 44.6, platelets 243, glucose 101, BUN 30, creatinine 0.8, sodium 141, potassium 4.9, chloride 102, calcium 9.6, GFR 96, AST 16, ALT 15, ALP 87, total cholesterol 163, triglyceride 77, HDL 45, , A1c 4.6    /70 (BP Location: Left arm)   Pulse 76   Ht 180.3 cm (70.98\")   Wt 116 kg (255 lb)   BMI 35.59 kg/m²     Vitals and nursing note reviewed.   Eyes:      Pupils: Pupils are equal, round, and reactive to light.   HENT:      Head: Normocephalic.   Neck:      Vascular: No carotid bruit. "   Pulmonary:      Breath sounds: Normal breath sounds.   Cardiovascular:      Normal rate. Regular rhythm.      Murmurs: There is a grade 2/6 mid frequency systolic murmur.   Pulses:     Intact distal pulses.   Edema:     Peripheral edema absent.   Abdominal:      General: Bowel sounds are normal.      Palpations: Abdomen is soft.   Musculoskeletal: Normal range of motion.      Cervical back: Normal range of motion. Skin:     General: Skin is warm.   Neurological:      Mental Status: Alert and oriented to person, place, and time.            ECG 12 Lead    Date/Time: 5/2/2022 8:13 AM  Performed by: Madonna Horner APRN  Authorized by: Madonna Horner APRN   Comparison: compared with previous ECG from 9/9/2021  Comparison to previous ECG: Sinus lea  Rhythm: sinus rhythm  BPM: 81  Comments: VA interval 162 ms, QT/QTc interval 400/437 ms                 Assessment/Plan   Diagnoses and all orders for this visit:    1. Paroxysmal atrial fibrillation (HCC) (Primary)  -     ECG 12 Lead  -     Sotalol HCl AF 80 MG tablet; Take 1/2 tablet twice a day and take and extra 1/2 to 1 tablet if needed for episode of atrial fib  Dispense: 180 tablet; Refill: 3    2. Long term current use of antiarrhythmic medical therapy    3. Essential hypertension  -     amLODIPine (NORVASC) 5 MG tablet; Take 1 tablet by mouth Every Night.  Dispense: 90 tablet; Refill: 3  -     doxazosin (CARDURA) 4 MG tablet; Take 1 tablet by mouth Every Night.  Dispense: 90 tablet; Refill: 3    4. Hypercholesteremia    5. Current use of long term anticoagulation    6. Mitral valve insufficiency, unspecified etiology      PAF/medication management  - EKG today shows sinus rhythm with normal VA and QTc intervals.  He is currently taking sotalol 40 mg twice a day we will continue at current dose.  He understands to take extra 1/2 to 1 tablet if he has episode of irregular heart rhythm.  -For stroke prophylaxis he is on Xarelto therapy and bleeding denied.   Continue same.    Hypertension  - BP stable today  -Continue current dose amlodipine, Hyzaar, and doxazosin.  -DASH diet information provided  -Continue to monitor BP and call for concerns.    Hypercholesterolemia  -Thank you for sending copy of lab results.  Lipids well controlled.  Continue Crestor 5 mg daily.    Mild regurgitation  -Clinically stable.  No repeat cardiac testing ordered at this time.    A 6-month follow-up visit scheduled.  Please call sooner for cardiac concerns.

## 2022-05-04 RX ORDER — LOSARTAN POTASSIUM AND HYDROCHLOROTHIAZIDE 25; 100 MG/1; MG/1
1 TABLET ORAL DAILY
Qty: 90 TABLET | Refills: 3 | Status: SHIPPED | OUTPATIENT
Start: 2022-05-04

## 2022-11-07 RX ORDER — LOSARTAN POTASSIUM 25 MG/1
TABLET ORAL
Qty: 90 TABLET | OUTPATIENT
Start: 2022-11-07

## 2022-11-15 ENCOUNTER — OFFICE VISIT (OUTPATIENT)
Dept: CARDIOLOGY | Facility: CLINIC | Age: 72
End: 2022-11-15

## 2022-11-15 VITALS
HEART RATE: 62 BPM | WEIGHT: 254.4 LBS | BODY MASS INDEX: 35.61 KG/M2 | HEIGHT: 71 IN | SYSTOLIC BLOOD PRESSURE: 160 MMHG | DIASTOLIC BLOOD PRESSURE: 74 MMHG

## 2022-11-15 DIAGNOSIS — Z79.01 LONG TERM CURRENT USE OF ANTICOAGULANT: ICD-10-CM

## 2022-11-15 DIAGNOSIS — R09.89 ABDOMINAL BRUIT: ICD-10-CM

## 2022-11-15 DIAGNOSIS — G47.30 SLEEP APNEA, UNSPECIFIED TYPE: ICD-10-CM

## 2022-11-15 DIAGNOSIS — G89.29 CHRONIC BILATERAL BACK PAIN, UNSPECIFIED BACK LOCATION: ICD-10-CM

## 2022-11-15 DIAGNOSIS — R51.9 MORNING HEADACHE: ICD-10-CM

## 2022-11-15 DIAGNOSIS — Z79.899 LONG TERM CURRENT USE OF ANTIARRHYTHMIC MEDICAL THERAPY: ICD-10-CM

## 2022-11-15 DIAGNOSIS — E78.00 HYPERCHOLESTEREMIA: ICD-10-CM

## 2022-11-15 DIAGNOSIS — I10 ESSENTIAL HYPERTENSION: ICD-10-CM

## 2022-11-15 DIAGNOSIS — I48.0 PAROXYSMAL ATRIAL FIBRILLATION: Primary | ICD-10-CM

## 2022-11-15 DIAGNOSIS — M54.9 CHRONIC BILATERAL BACK PAIN, UNSPECIFIED BACK LOCATION: ICD-10-CM

## 2022-11-15 PROCEDURE — 99214 OFFICE O/P EST MOD 30 MIN: CPT | Performed by: NURSE PRACTITIONER

## 2022-11-15 PROCEDURE — 93000 ELECTROCARDIOGRAM COMPLETE: CPT | Performed by: NURSE PRACTITIONER

## 2022-11-15 RX ORDER — AMLODIPINE BESYLATE 5 MG/1
TABLET ORAL
Qty: 120 TABLET | Refills: 3 | Status: SHIPPED | OUTPATIENT
Start: 2022-11-15

## 2022-11-15 RX ORDER — FINASTERIDE 5 MG/1
5 TABLET, FILM COATED ORAL DAILY
COMMUNITY

## 2022-11-15 NOTE — PROGRESS NOTES
Chief Complaint   Patient presents with   • Follow-up     Cardiac management   • Atrial Fibrillation     Had 1 episode of AFIB in last couple months ago   • LABS     Results May 2022 on chart   • Med Refill     Needs refills on xarelto 90 day supply to [facility]   • Hypertension     Reports BP has been running high in morning hours and fluctuates in afternoon       Subjective       Orlando Wylie is a 71 y.o. male with HTN, hyperlipidemia, and PAF diagnosed in 2012 when he underwent cardioversion. In September 2016, he saw Dr. Law to establish cardiac care. He had seen another cardiologist prior, had workup and was told everything was normal. His main concern was being on amiodarone for over 3 years. Since his LV function was normal and no ischemia was found, it was decided to stop amiodarone and flecainide was started. EKG has remained sinus.  Later he had more atrial fibrillation and Xarelto therapy started.  Repeat stress and echo shows mildly diminished LV function but no ischemia.  On 12/4/2020 he underwent successful cardioversion and later experienced bradycardia for which Lanoxin was stopped and Lopressor decreased.  Flecainide had been changed to Multaq therapy.  Follow-up EKG was stable showing sinus bradycardia with heart rate of 53 bpm.  In January 2021 cardiac clearance was given for total knee revision.  He had postoperative complications and underwent physical therapy at [facility]. Later antiarrhythmic medication management was changed to sotalol.    Today he returns to the office for a follow-up visit.  His main concern is increase in blood pressure in the mornings.  He did have extra losartan tablets for which she took and blood pressure would improve.  In the past he was diagnosed with nocturnal desaturation but did not proceed with sleep study as he was unsure if he could tolerate CPAP therapy.  Chest pain denied.  He admits to 1 episode of palpitations that were nonconcerning.   Shortness of breath is no worse.  No recent change in cardiac medications noted.    Cardiac History:    Past Surgical History:   Procedure Laterality Date   • AORTAGRAM N/A 5/3/2021    Procedure: COMMON FEMORAL LEFT, RIGHT COMMON FEMORAL, AND RIGHT POSTERIOR TIBIAL ACCESS WITH AORTAGRAM, RIGHT LEG ANGIOGRAM, RIGHT POPLITEAL ARTERY ANGIOPLASTY AND STENT, IVUS OF RIGHT SUPERFICIAL FEMORAL AND POPLITEAL ARTERIES;  Surgeon: Paulo Mcduffie MD;  Location: [facility];  Service: Vascular;  Laterality: N/A;  FLOURO- 11 min, 30 sec  DOSE- 35 mL visi  MGY- 34     • CARDIOVASCULAR STRESS TEST  09/01/2015    [email or web address]- Neagtive.   • CARDIOVASCULAR STRESS TEST  08/31/2020    3 Min. 15 Secs. 4.6 METS. A.Fib RVR. 113% THR. BP- 153/95. EF 45%. Negative.   • CARDIOVERSION  12/04/2020    Cardioverted to Sinus   • DISTAL POPLITEAL ARTERY BYPASS  10/13/2020    below the knee   • ECHO - CONVERTED  09/01/2015    @[email or web address]. Dr. Parker- EF 65%. Mild MR and AI   • ECHO - CONVERTED  08/31/2020    EF 55%. LA- 5.1 Cm.Mod MR. Mild AI. RVSP- 33 mmHG. (A.Fib)   • KNEE ARTHROPLASTY Bilateral    • OTHER SURGICAL HISTORY  12/10/2020    Pulse O2- 84 Min of Hyoxia.   • TONSILLECTOMY     • TOTAL KNEE ARTHROPLASTY REVISION Bilateral    • TRANSESOPHAGEAL ECHOCARDIOGRAM (SANDRITA) AND CARDIOVERSION  11/05/2012    Dr. MEDELLIN       Current Outpatient Medications   Medication Sig Dispense Refill   • acetaZOLAMIDE (DIAMOX) 250 MG tablet Take 250 mg by mouth Daily.     • amLODIPine (NORVASC) 5 MG tablet Take 1 tablet daily and extra 1/2 to 1 tablet if needed for increased /90 120 tablet 3   • aspirin 81 MG chewable tablet Chew 81 mg Daily.     • difluprednate (Durezol) 0.05 % ophthalmic emulsion Administer 1 drop into the left eye 2 (two) times a day.     • doxazosin (CARDURA) 4 MG tablet Take 1 tablet by mouth Every Night. 90 tablet 3   • finasteride (PROSCAR) 5 MG tablet Take 1 tablet by mouth Daily.     • losartan-hydrochlorothiazide (HYZAAR)  100-25 MG per tablet Take 1 tablet by mouth Daily. 90 tablet 3   • rivaroxaban (Xarelto) 20 MG tablet Take 1 tablet by mouth Daily. 30 tablet 8   • rosuvastatin (CRESTOR) 5 MG tablet Take 5 mg by mouth Daily. Takes 1 tablet every other day     • Sotalol HCl AF 80 MG tablet Take 1/2 tablet twice a day and take and extra 1/2 to 1 tablet if needed for episode of atrial fib 180 tablet 3     No current facility-administered medications for this visit.       Lisinopril    Past Medical History:   Diagnosis Date   • Arthritis    • BPH (benign prostatic hyperplasia)    • Cataract    • Diverticulosis    • Exogenous obesity    • H/O prostate biopsy 06/29/2010    negative findings   • History of colon polyps    • History of knee replacement    • History of left knee replacement 06/25/2019   • [place] (hard of hearing)     hearing aids   • Hyperlipidemia    • Hypertension    • PAF (paroxysmal atrial fibrillation) (HCC)    • PVD (peripheral vascular disease) (HCC)    • Sleep apnea     no cpap   • Varicose veins        Social History     Socioeconomic History   • Marital status:    Tobacco Use   • Smoking status: Never   • Smokeless tobacco: Never   Vaping Use   • Vaping Use: Never used   Substance and Sexual Activity   • Alcohol use: Yes     Comment: occasional beer   • Drug use: No   • Sexual activity: Defer       Family History   Problem Relation Age of Onset   • Atrial fibrillation Mother    • No Known Problems Father    • Hypertension Brother    • No Known Problems Daughter    • No Known Problems Son        Review of Systems   Constitutional: Negative for decreased appetite, diaphoresis and malaise/fatigue.   HENT: Negative for nosebleeds.    Eyes: Negative for blurred vision.   Cardiovascular: Positive for leg swelling (no worse). Negative for chest pain, claudication, cyanosis, dyspnea on exertion, irregular heartbeat, near-syncope, orthopnea, palpitations, paroxysmal nocturnal dyspnea and syncope.   Respiratory:  Positive for sleep disturbances due to breathing (admits to being restless at night) and snoring. Negative for shortness of breath.    Endocrine: Negative for cold intolerance and heat intolerance.   Hematologic/Lymphatic: Negative for bleeding problem. Bruises/bleeds easily.   Skin: Negative for rash.   Musculoskeletal: Positive for arthritis, back pain, joint pain and stiffness. Negative for falls and myalgias.   Gastrointestinal: Negative for heartburn, melena and nausea.   Genitourinary: Positive for nocturia. Negative for dysuria and hematuria.   Neurological: Negative for dizziness and light-headedness.   Psychiatric/Behavioral: The patient does not have insomnia and is not nervous/anxious.         BP Readings from Last 5 Encounters:   11/15/22 160/74   05/02/22 136/70   09/09/21 120/72   05/03/21 152/86   03/04/21 120/62       Wt Readings from Last 5 Encounters:   11/15/22 115 kg (254 lb 6.4 oz)   05/02/22 116 kg (255 lb)   09/09/21 109 kg (240 lb)   05/03/21 112 kg (246 lb)   04/30/21 112 kg (246 lb)       Objective        Labs 11/22/2021 per PCP: WBC 4.1, RBC 4.7, hemoglobin 14, hematocrit 42.8, platelets 201, glucose 98, BUN 21, creatinine 1.2, sodium 136, potassium 4.8, chloride 95, CO2 34, calcium 9.8, total protein 7.2, albumin 4.1, AST 22, ALT 12, ALP 85, total bili 1, GFR 62, total cholesterol 155, triglycerides 90, HDL 57, LDL 80     Labs 6/2/21 per PCP: Glucose 82, BUN 12, creatinine 0.8, sodium 140, potassium 4.4, chloride 101, CO2 33, calcium 9.2, total protein 6.7, albumin 3.9, AST 25, ALT 12, ALP 72, total bili 0.7, , total cholesterol 139, triglycerides 61, HDL 46, LDL 81, TSH 2.32     Labs 11/25/2020: WBC 5, RBC 4.75, hemoglobin 13.9, hematocrit 44.6, platelets 243, glucose 101, BUN 30, creatinine 0.8, sodium 141, potassium 4.9, chloride 102, calcium 9.6, GFR 96, AST 16, ALT 15, ALP 87, total cholesterol 163, triglyceride 77, HDL 45, , A1c 4.6    /74 (BP Location: Left arm,  "Patient Position: Sitting)   Pulse 62   Ht 180.3 cm (70.98\")   Wt 115 kg (254 lb 6.4 oz)   BMI 35.50 kg/m²     Vitals and nursing note reviewed.   Constitutional:       Appearance: Not in distress.   Eyes:      Conjunctiva/sclera: Conjunctivae normal.      Pupils: Pupils are equal, round, and reactive to light.   HENT:      Head: Normocephalic.   Neck:      Vascular: No carotid bruit.   Pulmonary:      Effort: Pulmonary effort is normal.      Breath sounds: Normal breath sounds.   Cardiovascular:      PMI at left midclavicular line. Normal rate. Regular rhythm.      Murmurs: There is a grade 2/6 mid frequency holosystolic murmur.   Edema:     Ankle: bilateral 1+ edema of the ankle.     Feet: bilateral trace edema of the feet.  Abdominal:      General: Bowel sounds are normal. There is abdominal bruit (? ). There is no distension.      Palpations: Abdomen is soft.      Tenderness: There is no abdominal tenderness.   Musculoskeletal: Normal range of motion.      Cervical back: Normal range of motion and neck supple. Skin:     General: Skin is warm and dry.   Neurological:      Mental Status: Alert, oriented to person, place, and time and oriented to person, place and time.            ECG 12 Lead    Date/Time: 11/15/2022 7:57 AM  Performed by: Madonna Horner APRN  Authorized by: Madonna Horner APRN   Comparison: compared with previous ECG from 5/2/2022  Comparison to previous ECG: Sinus with normal CO and QTc                   Assessment & Plan   Diagnoses and all orders for this visit:    1. Paroxysmal atrial fibrillation (HCC) (Primary)  -     ECG 12 Lead  -     rivaroxaban (Xarelto) 20 MG tablet; Take 1 tablet by mouth Daily.  Dispense: 30 tablet; Refill: 8    2. Long term current use of antiarrhythmic medical therapy  -     ECG 12 Lead    3. Long term current use of anticoagulant    4. Essential hypertension  -     amLODIPine (NORVASC) 5 MG tablet; Take 1 tablet daily and extra 1/2 to 1 tablet if needed for " increased /90  Dispense: 120 tablet; Refill: 3  -     Overnight Sleep Oximetry Study; Future  -     US Aorta Limited; Future    5. Hypercholesteremia    6. Morning headache  -     Overnight Sleep Oximetry Study; Future    7. Sleep apnea, unspecified type  -     Overnight Sleep Oximetry Study; Future    8. Abdominal bruit  -     US Aorta Limited; Future    9. Chronic bilateral back pain, unspecified back location      Patient has had some issues with morning hypertension.  He agrees to repeat overnight oxygen monitor to see if continues to have oxygen desaturation.  If significant then will advise referral back to you or to pulmonologist.  Patient feels he could undergo a home sleep study if warranted but would not tolerate a lab setting very well.    For blood pressure management we will continue current medications.  He can take an extra 1/2 to 1 tablet of Norvasc if needed for blood pressure greater than 150/90.  DASH diet encouraged.    EKG today shows sinus rhythm with normal QTC interval.  Continue sotalol 40 mg twice a day with extra 40 mg if needed.  For stroke prevention continue Xarelto therapy.  Signs of bleeding denied.    Patient has chronic back pain.  Questionable abdominal bruit noted.  And limited ultrasound of aorta ordered to further evaluate.    Currently patient appears stable from a cardiac standpoint.  No repeat stress test or echocardiogram warranted at this time.  A 6-month follow-up visit scheduled.  Please call sooner for cardiac concerns.

## 2022-11-22 ENCOUNTER — HOSPITAL ENCOUNTER (OUTPATIENT)
Dept: CARDIOLOGY | Facility: HOSPITAL | Age: 72
Discharge: HOME | End: 2022-11-22
Admitting: NURSE PRACTITIONER

## 2022-11-22 DIAGNOSIS — R09.89 ABDOMINAL BRUIT: ICD-10-CM

## 2022-11-22 DIAGNOSIS — I10 ESSENTIAL HYPERTENSION: ICD-10-CM

## 2022-11-22 PROCEDURE — 76775 US EXAM ABDO BACK WALL LIM: CPT | Performed by: RADIOLOGY

## 2022-11-22 PROCEDURE — 76775 US EXAM ABDO BACK WALL LIM: CPT

## 2023-04-28 RX ORDER — LOSARTAN POTASSIUM AND HYDROCHLOROTHIAZIDE 25; 100 MG/1; MG/1
1 TABLET ORAL DAILY
Qty: 90 TABLET | Refills: 0 | Status: SHIPPED | OUTPATIENT
Start: 2023-04-28

## 2023-06-12 ENCOUNTER — OFFICE VISIT (OUTPATIENT)
Dept: CARDIOLOGY | Facility: CLINIC | Age: 73
End: 2023-06-12
Payer: COMMERCIAL

## 2023-06-12 VITALS
DIASTOLIC BLOOD PRESSURE: 60 MMHG | BODY MASS INDEX: 34.86 KG/M2 | SYSTOLIC BLOOD PRESSURE: 114 MMHG | WEIGHT: 249 LBS | HEART RATE: 63 BPM | HEIGHT: 71 IN

## 2023-06-12 DIAGNOSIS — Z79.899 LONG TERM CURRENT USE OF ANTIARRHYTHMIC MEDICAL THERAPY: ICD-10-CM

## 2023-06-12 DIAGNOSIS — E78.00 HYPERCHOLESTEREMIA: Primary | ICD-10-CM

## 2023-06-12 DIAGNOSIS — Z79.01 CURRENT USE OF LONG TERM ANTICOAGULATION: ICD-10-CM

## 2023-06-12 DIAGNOSIS — E66.9 OBESITY (BMI 30.0-34.9): ICD-10-CM

## 2023-06-12 DIAGNOSIS — I10 ESSENTIAL HYPERTENSION: ICD-10-CM

## 2023-06-12 DIAGNOSIS — I48.0 PAROXYSMAL ATRIAL FIBRILLATION: ICD-10-CM

## 2023-06-12 RX ORDER — AMLODIPINE BESYLATE 5 MG/1
TABLET ORAL
Qty: 120 TABLET | Refills: 3 | Status: SHIPPED | OUTPATIENT
Start: 2023-06-12

## 2023-06-12 RX ORDER — ROSUVASTATIN CALCIUM 5 MG/1
5 TABLET, COATED ORAL DAILY
Qty: 90 TABLET | Refills: 2 | Status: SHIPPED | OUTPATIENT
Start: 2023-06-12

## 2023-06-12 NOTE — PROGRESS NOTES
Chief Complaint   Patient presents with    Follow-up     Pt is here for cardiac follow up. Pt has PAF. Pt had some palpitations but denies SOA, dizziness, chest pain. BP dropped lowest to low 80's, when that low pt states they stop taking medication. HR also went high when BP dropped. The occurrence happened a few weeks ago and now is fine. Pt is taking medication again.       Med Refill     Pt will need med refills today. [facility], 90 day supply.    Labs Only     05/25/2022 last in chart. Has had recent with PCP, will call to obtained labs.        Cardiac Complaints  palpitations      Subjective   Orlando Wylie is a 72 y.o. male with HTN, hyperlipidemia, and PAF diagnosed in 2012 when he underwent cardioversion. In September 2016, he saw Dr. Law to establish cardiac care. He had seen another cardiologist prior, had workup and was told everything was normal. His main concern was being on amiodarone for over 3 years. Since his LV function was normal and no ischemia was found, it was decided to stop amiodarone and flecainide was started. EKG has remained sinus.  Later he had more atrial fibrillation and Xarelto therapy started.  Repeat stress and echo shows mildly diminished LV function but no ischemia.  On 12/4/2020 he underwent successful cardioversion and later experienced bradycardia for which Lanoxin was stopped and Lopressor decreased.  Flecainide had been changed to Multaq therapy.  Follow-up EKG was stable showing sinus bradycardia with heart rate of 53 bpm.  In January 2021 cardiac clearance was given for total knee revision.  He had postoperative complications and underwent physical therapy at [facility]. Later antiarrhythmic medication management was changed to sotalol. No mention of aortic aneurysm on US of aorta 2022.    He comes today for follow up and admits to palpitations several weeks ago. He states he thinks he was in afib at that time. His BP dropped and his HR was high, he had stopped  taking meds at that time, this happened several weeks ago, but he has not had any instances since. Refills requested. Labs done with PCP, no current available. Refills requested.              Cardiac History  Past Surgical History:   Procedure Laterality Date    AORTAGRAM N/A 5/3/2021    Procedure: COMMON FEMORAL LEFT, RIGHT COMMON FEMORAL, AND RIGHT POSTERIOR TIBIAL ACCESS WITH AORTAGRAM, RIGHT LEG ANGIOGRAM, RIGHT POPLITEAL ARTERY ANGIOPLASTY AND STENT, IVUS OF RIGHT SUPERFICIAL FEMORAL AND POPLITEAL ARTERIES;  Surgeon: Paulo Mcduffie MD;  Location: [facility];  Service: Vascular;  Laterality: N/A;  FLOURO- 11 min, 30 sec  DOSE- 35 mL visi  MGY- 34      CARDIOVASCULAR STRESS TEST  09/01/2015    [email or web address] Myoview- Neagtive.    CARDIOVASCULAR STRESS TEST  08/31/2020    3 Min. 15 Secs. 4.6 METS. A.Fib RVR. 113% THR. BP- 153/95. EF 45%. Negative.    CARDIOVERSION  12/04/2020    Cardioverted to Sinus    DISTAL POPLITEAL ARTERY BYPASS  10/13/2020    below the knee    ECHO - CONVERTED  09/01/2015    @[email or web address]. Dr. Parker- EF 65%. Mild MR and AI    ECHO - CONVERTED  08/31/2020    EF 55%. LA- 5.1 Cm.Mod MR. Mild AI. RVSP- 33 mmHG. (A.Fib)    KNEE ARTHROPLASTY Bilateral     OTHER SURGICAL HISTORY  12/10/2020    Pulse O2- 84 Min of Hyoxia.    TONSILLECTOMY      TOTAL KNEE ARTHROPLASTY REVISION Bilateral     TRANSESOPHAGEAL ECHOCARDIOGRAM (SANDRITA) AND CARDIOVERSION  11/05/2012    Dr. MEDELLIN       Current Outpatient Medications   Medication Sig Dispense Refill    acetaZOLAMIDE (DIAMOX) 250 MG tablet Take 1 tablet by mouth Daily.      amLODIPine (NORVASC) 5 MG tablet Take 1 tablet daily and extra 1/2 to 1 tablet if needed for increased /90 120 tablet 3    aspirin 81 MG chewable tablet Chew 1 tablet Daily.      difluprednate (Durezol) 0.05 % ophthalmic emulsion Administer 1 drop into the left eye 2 (two) times a day.      doxazosin (CARDURA) 4 MG tablet Take 1 tablet by mouth Every Night. 90 tablet 3    finasteride  (PROSCAR) 5 MG tablet Take 1 tablet by mouth Daily.      losartan-hydrochlorothiazide (HYZAAR) 100-25 MG per tablet Take 1 tablet by mouth Daily. 90 tablet 0    rivaroxaban (Xarelto) 20 MG tablet Take 1 tablet by mouth Daily. 90 tablet 2    rosuvastatin (CRESTOR) 5 MG tablet Take 1 tablet by mouth Daily. Takes 1 tablet every other day 90 tablet 2    Sotalol HCl AF 80 MG tablet Take 1/2 tablet twice a day and take and extra 1/2 to 1 tablet if needed for episode of atrial fib 180 tablet 3     No current facility-administered medications for this visit.       Lisinopril    Past Medical History:   Diagnosis Date    Arthritis     BPH (benign prostatic hyperplasia)     Cataract     Diverticulosis     Exogenous obesity     H/O prostate biopsy 06/29/2010    negative findings    History of colon polyps     History of knee replacement     History of left knee replacement 06/25/2019    [place] (hard of hearing)     hearing aids    Hyperlipidemia     Hypertension     PAF (paroxysmal atrial fibrillation)     PVD (peripheral vascular disease)     Sleep apnea     no cpap    Varicose veins        Social History     Socioeconomic History    Marital status:    Tobacco Use    Smoking status: Never    Smokeless tobacco: Never   Vaping Use    Vaping Use: Never used   Substance and Sexual Activity    Alcohol use: Yes     Comment: occasional beer    Drug use: No    Sexual activity: Defer       Family History   Problem Relation Age of Onset    Atrial fibrillation Mother     No Known Problems Father     Hypertension Brother     No Known Problems Daughter     No Known Problems Son        Review of Systems   Constitutional: Negative for malaise/fatigue and night sweats.   Cardiovascular:  Positive for palpitations. Negative for chest pain, claudication, dyspnea on exertion, irregular heartbeat, leg swelling, near-syncope, orthopnea and syncope.   Respiratory:  Negative for cough, shortness of breath and wheezing.    Musculoskeletal:   "Negative for back pain, joint pain and stiffness.   Gastrointestinal:  Negative for anorexia, heartburn, nausea and vomiting.   Genitourinary:  Negative for dysuria, hematuria, hesitancy and nocturia.   Neurological:  Negative for dizziness, headaches and light-headedness.   Psychiatric/Behavioral:  Negative for depression and memory loss. The patient is not nervous/anxious.          Objective     /60 (BP Location: Left arm, Patient Position: Sitting, Cuff Size: Large Adult)   Pulse 63   Ht 180.3 cm (70.98\")   Wt 113 kg (249 lb)   BMI 34.75 kg/m²     Constitutional:       Appearance: Not in distress.   Eyes:      Pupils: Pupils are equal, round, and reactive to light.   HENT:      Nose: Nose normal.   Pulmonary:      Effort: Pulmonary effort is normal.      Breath sounds: Normal breath sounds.   Cardiovascular:      PMI at left midclavicular line. Normal rate. Regular rhythm.      Murmurs: There is a systolic murmur.   Abdominal:      Palpations: Abdomen is soft.   Musculoskeletal: Normal range of motion.      Cervical back: Normal range of motion and neck supple. Skin:     General: Skin is warm and dry.   Neurological:      Mental Status: Alert.         ECG 12 Lead    Date/Time: 6/12/2023 8:29 AM  Performed by: Lolita Joshua APRN  Authorized by: Lolita Joshua APRN   Comparison: compared with previous ECG from 11/15/2022  Rhythm: sinus rhythm  BPM: 63    Clinical impression: abnormal EKG  Comments: Normal QT             Diagnoses and all orders for this visit:    1. Hypercholesteremia (Primary)    2. Essential hypertension  -     amLODIPine (NORVASC) 5 MG tablet; Take 1 tablet daily and extra 1/2 to 1 tablet if needed for increased /90  Dispense: 120 tablet; Refill: 3    3. Paroxysmal atrial fibrillation  -     ECG 12 Lead  -     rivaroxaban (Xarelto) 20 MG tablet; Take 1 tablet by mouth Daily.  Dispense: 90 tablet; Refill: 2  -     Sotalol HCl AF 80 MG tablet; Take 1/2 tablet twice a day and " take and extra 1/2 to 1 tablet if needed for episode of atrial fib  Dispense: 180 tablet; Refill: 3    4. Long term current use of antiarrhythmic medical therapy    5. Current use of long term anticoagulation    6. Obesity (BMI 30.0-34.9)    Other orders  -     rosuvastatin (CRESTOR) 5 MG tablet; Take 1 tablet by mouth Daily. Takes 1 tablet every other day  Dispense: 90 tablet; Refill: 2             PAF: EKG done today for sotalol and xarelto therapy showed .  He will continue same. Limited caffeine diet urged. Same continued as rate stable and bruise/bleed denied.    HTN: BP is stable. No adjustment to current hyzaar or norvasc therapy urged. Limited sodium intake encouraged.    Hyperlipidemia: On statin therapy with crestor. Patient tolerates well. Limited carb, calories, and fried/fatty food intake encouraged. Can we have labs for review?    Cardiac status: stable. Most recent workup 2020 neg for concerns, no repeat advised. ASA continued.    PVD: Claudication/ulceration denied. Continue ASA and xarelto.    Refills per request.    BMI noted at 34.75, good cardiac diet encouraged.     6 month follow up recommended, or sooner if needed.            Problems Addressed this Visit          Cardiac and Vasculature    Paroxysmal atrial fibrillation    Relevant Medications    amLODIPine (NORVASC) 5 MG tablet    rivaroxaban (Xarelto) 20 MG tablet    Sotalol HCl AF 80 MG tablet    Other Relevant Orders    ECG 12 Lead    Essential hypertension    Relevant Medications    amLODIPine (NORVASC) 5 MG tablet    Sotalol HCl AF 80 MG tablet    Hypercholesteremia - Primary    Relevant Medications    rosuvastatin (CRESTOR) 5 MG tablet    Long term current use of antiarrhythmic medical therapy       Coag and Thromboembolic    Current use of long term anticoagulation     Other Visit Diagnoses       Obesity (BMI 30.0-34.9)              Diagnoses         Codes Comments    Hypercholesteremia    -  Primary ICD-10-CM: E78.00  ICD-9-CM: 272.0      Essential hypertension     ICD-10-CM: I10  ICD-9-CM: 401.9     Paroxysmal atrial fibrillation     ICD-10-CM: I48.0  ICD-9-CM: 427.31     Long term current use of antiarrhythmic medical therapy     ICD-10-CM: Z79.899  ICD-9-CM: V58.69     Current use of long term anticoagulation     ICD-10-CM: Z79.01  ICD-9-CM: V58.61     Obesity (BMI 30.0-34.9)     ICD-10-CM: E66.9  ICD-9-CM: 278.00                     Electronically signed by Lolita Joshua, APRN June 12, 2023 09:38 EDT

## 2023-08-16 ENCOUNTER — TELEPHONE (OUTPATIENT)
Dept: CARDIOLOGY | Facility: CLINIC | Age: 73
End: 2023-08-16
Payer: COMMERCIAL

## 2023-08-16 NOTE — TELEPHONE ENCOUNTER
Received fax from Dr. Salas for cardiac clearance for patient to have a holmium laser enucleation of the prostate. Patient is on Xarelto and they are requesting to hold for 3 days prior to procedure AND for 2 weeks after. According to our records, I do not see where patient has had any stenting. Patient has a history of PAF.           Fax [phone]  Attn: Sarina

## 2023-08-16 NOTE — TELEPHONE ENCOUNTER
Okay for 3 days.  Regarding the 2 weeks, it can be stopped as long patient is aware there will be increased risk of stroke

## 2023-08-18 RX ORDER — LOSARTAN POTASSIUM AND HYDROCHLOROTHIAZIDE 25; 100 MG/1; MG/1
TABLET ORAL
Qty: 90 TABLET | Refills: 0 | Status: SHIPPED | OUTPATIENT
Start: 2023-08-18

## 2023-11-03 ENCOUNTER — TELEPHONE (OUTPATIENT)
Dept: CARDIOLOGY | Facility: CLINIC | Age: 73
End: 2023-11-03
Payer: COMMERCIAL

## 2023-11-03 NOTE — TELEPHONE ENCOUNTER
Caller: LEFTY NURSE    Relationship: Provider    Best call back number: [phone]    What form or medical record are you requesting: MOST RECENT EKG    Who is requesting this form or medical record from you: DR. IVERSON AT [facility]    How would you like to receive the form or medical records (pick-up, mail, fax): FAX  If fax, what is the fax number: [phone]      Timeframe paperwork needed: ASAP BUT PRIOR TO SURGERY THAT IS SCHEDULED 11.11.23    Additional notes:

## 2023-12-06 NOTE — TELEPHONE ENCOUNTER
[facility] pharmacy called for prescription for Losartan /HCTZ . The last order on chart is Losartan /HCTZ 100/25 mg . Patient had told pharmacist he is supposed to be on Losartan/HCTZ 100/50 mg.   He had been restarted on medication at last office visit 11- .  Which dose is he to be on ?  
There is no 100/50mg.  And I advised in November on losartan 25mg but no losartan with HCTZ that I see in my note. That must have come from Dr. Rubio. Patient did not tell us he was taking it.
Patent

## 2023-12-19 ENCOUNTER — OFFICE VISIT (OUTPATIENT)
Dept: CARDIOLOGY | Facility: CLINIC | Age: 73
End: 2023-12-19
Payer: COMMERCIAL

## 2023-12-19 VITALS
DIASTOLIC BLOOD PRESSURE: 88 MMHG | WEIGHT: 243.6 LBS | HEART RATE: 80 BPM | BODY MASS INDEX: 34.88 KG/M2 | SYSTOLIC BLOOD PRESSURE: 152 MMHG | HEIGHT: 70 IN

## 2023-12-19 DIAGNOSIS — E78.00 HYPERCHOLESTEREMIA: ICD-10-CM

## 2023-12-19 DIAGNOSIS — E66.9 OBESITY (BMI 30.0-34.9): ICD-10-CM

## 2023-12-19 DIAGNOSIS — I10 ESSENTIAL HYPERTENSION: ICD-10-CM

## 2023-12-19 DIAGNOSIS — I48.0 PAROXYSMAL ATRIAL FIBRILLATION: Primary | ICD-10-CM

## 2023-12-19 DIAGNOSIS — I73.9 ASYMPTOMATIC PVD (PERIPHERAL VASCULAR DISEASE): ICD-10-CM

## 2023-12-19 PROCEDURE — 93000 ELECTROCARDIOGRAM COMPLETE: CPT | Performed by: NURSE PRACTITIONER

## 2023-12-19 PROCEDURE — 99214 OFFICE O/P EST MOD 30 MIN: CPT | Performed by: NURSE PRACTITIONER

## 2023-12-19 RX ORDER — ROSUVASTATIN CALCIUM 5 MG/1
5 TABLET, COATED ORAL DAILY
Qty: 90 TABLET | Refills: 2 | Status: SHIPPED | OUTPATIENT
Start: 2023-12-19 | End: 2023-12-19 | Stop reason: SDUPTHER

## 2023-12-19 RX ORDER — DILTIAZEM HYDROCHLORIDE 120 MG/1
120 CAPSULE, COATED, EXTENDED RELEASE ORAL DAILY
Qty: 30 CAPSULE | Refills: 1 | Status: SHIPPED | OUTPATIENT
Start: 2023-12-19

## 2023-12-19 RX ORDER — AMLODIPINE BESYLATE 5 MG/1
TABLET ORAL
Qty: 120 TABLET | Refills: 3 | Status: SHIPPED | OUTPATIENT
Start: 2023-12-19

## 2023-12-19 RX ORDER — SOTALOL HYDROCHLORIDE 120 MG/1
120 TABLET ORAL 2 TIMES DAILY
Qty: 180 TABLET | Refills: 2 | Status: SHIPPED | OUTPATIENT
Start: 2023-12-19

## 2023-12-19 RX ORDER — LOSARTAN POTASSIUM AND HYDROCHLOROTHIAZIDE 25; 100 MG/1; MG/1
1 TABLET ORAL DAILY
Qty: 90 TABLET | Refills: 2 | Status: SHIPPED | OUTPATIENT
Start: 2023-12-19

## 2023-12-19 RX ORDER — ROSUVASTATIN CALCIUM 5 MG/1
5 TABLET, COATED ORAL DAILY
Qty: 90 TABLET | Refills: 2 | Status: SHIPPED | OUTPATIENT
Start: 2023-12-19

## 2023-12-19 NOTE — PROGRESS NOTES
Chief Complaint   Patient presents with    Follow-up     Pt is here for cardiac follow up.  Pt denies CP, SOB, dizziness or palpitations.  Pt does take  a daily ASA.      Med Refill     Pt request 90 day refills to be sent to [facility].  Medications were verified by the pt.      Lab Work     Pt states their last labs were about 3 weeks ago with his PCP.         Cardiac Complaints  none      Subjective   Orlando Wylie is a 73 y.o. male with HTN, hyperlipidemia, and PAF diagnosed in 2012 when he underwent cardioversion. In September 2016, he saw Dr. Law to establish cardiac care. He had seen another cardiologist prior, had workup and was told everything was normal. His main concern was being on amiodarone for over 3 years. Since his LV function was normal and no ischemia was found, it was decided to stop amiodarone and flecainide was started. EKG has remained sinus.  Later he had more atrial fibrillation and Xarelto therapy started.  Repeat stress and echo shows mildly diminished LV function but no ischemia.  On 12/4/2020 he underwent successful cardioversion and later experienced bradycardia for which Lanoxin was stopped and Lopressor decreased.  Flecainide had been changed to Multaq therapy.  Follow-up EKG was stable showing sinus bradycardia with heart rate of 53 bpm.  In January 2021 cardiac clearance was given for total knee revision.  He had postoperative complications and underwent physical therapy at [facility]. Later antiarrhythmic medication management was changed to sotalol. No mention of aortic aneurysm on US of aorta 2022.     Patient comes today for follow up and denies any new concerns. No CP, SOA, dizziness, palpitations, or syncope noted. He does admit to a prostate procedure in November and feels his heart has been out of rhythm since. Labs done with PCP, checked about 6 weeks ago, no current available. Refills requested.          Cardiac History  Past Surgical History:   Procedure  Laterality Date    AORTAGRAM N/A 05/03/2021    Procedure: COMMON FEMORAL LEFT, RIGHT COMMON FEMORAL, AND RIGHT POSTERIOR TIBIAL ACCESS WITH AORTAGRAM, RIGHT LEG ANGIOGRAM, RIGHT POPLITEAL ARTERY ANGIOPLASTY AND STENT, IVUS OF RIGHT SUPERFICIAL FEMORAL AND POPLITEAL ARTERIES;  Surgeon: Paulo Mcduffie MD;  Location: [facility];  Service: Vascular;  Laterality: N/A;  FLOURO- 11 min, 30 sec  DOSE- 35 mL visi  MGY- 34      CARDIOVASCULAR STRESS TEST  09/01/2015    [email or web address] Myoview- Neagtive.    CARDIOVASCULAR STRESS TEST  08/31/2020    3 Min. 15 Secs. 4.6 METS. A.Fib RVR. 113% THR. BP- 153/95. EF 45%. Negative.    CARDIOVERSION  12/04/2020    Cardioverted to Sinus    DISTAL POPLITEAL ARTERY BYPASS  10/13/2020    below the knee    ECHO - CONVERTED  09/01/2015    @[facility]. Dr. Parker- EF 65%. Mild MR and AI    ECHO - CONVERTED  08/31/2020    EF 55%. LA- 5.1 Cm.Mod MR. Mild AI. RVSP- 33 mmHG. (A.Fib)    KNEE ARTHROPLASTY Bilateral     OTHER SURGICAL HISTORY  12/10/2020    Pulse O2- 84 Min of Hyoxia.    TONSILLECTOMY      TOTAL KNEE ARTHROPLASTY REVISION Bilateral     TRANSESOPHAGEAL ECHOCARDIOGRAM (SANDRITA) AND CARDIOVERSION  11/05/2012    Dr. VIGNESH ANDERSON / TRANSURETHRAL INCISION / DRAINAGE PROSTATE  2023    with laser       Current Outpatient Medications   Medication Sig Dispense Refill    amLODIPine (NORVASC) 5 MG tablet Take 1 tablet daily and extra 1/2 to 1 tablet if needed for increased /90 120 tablet 3    aspirin 81 MG chewable tablet Chew 1 tablet Daily.      difluprednate (Durezol) 0.05 % ophthalmic emulsion Administer 1 drop into the left eye 2 (two) times a day.      losartan-hydrochlorothiazide (HYZAAR) 100-25 MG per tablet Take 1 tablet by mouth Daily. 90 tablet 2    rivaroxaban (Xarelto) 20 MG tablet Take 1 tablet by mouth Daily. 90 tablet 2    rosuvastatin (CRESTOR) 5 MG tablet Take 1 tablet by mouth Daily. Takes 1 tablet every other day 90 tablet 2    dilTIAZem CD (Cardizem CD) 120 MG 24 hr capsule  Take 1 capsule by mouth Daily. 30 capsule 1    finasteride (PROSCAR) 5 MG tablet Take 6 tablets by mouth Daily.      sotalol (Betapace) 120 MG tablet Take 1 tablet by mouth 2 (Two) Times a Day. 180 tablet 2     No current facility-administered medications for this visit.       Lisinopril    Past Medical History:   Diagnosis Date    Arthritis     BPH (benign prostatic hyperplasia)     Cataract     Diverticulosis     Exogenous obesity     H/O prostate biopsy 06/29/2010    negative findings    History of colon polyps     History of knee replacement     History of left knee replacement 06/25/2019    [place] (hard of hearing)     hearing aids    Hyperlipidemia     Hypertension     PAF (paroxysmal atrial fibrillation)     PVD (peripheral vascular disease)     Sleep apnea     no cpap    Varicose veins        Social History     Socioeconomic History    Marital status:    Tobacco Use    Smoking status: Never    Smokeless tobacco: Never   Vaping Use    Vaping Use: Never used   Substance and Sexual Activity    Alcohol use: Yes     Comment: occasional beer    Drug use: No    Sexual activity: Defer       Family History   Problem Relation Age of Onset    Atrial fibrillation Mother     No Known Problems Father     Hypertension Brother     No Known Problems Daughter     No Known Problems Son        Review of Systems   Constitutional: Negative for malaise/fatigue and night sweats.   Cardiovascular:  Negative for chest pain, claudication, dyspnea on exertion, irregular heartbeat, leg swelling, near-syncope, orthopnea, palpitations and syncope.   Respiratory:  Negative for cough, shortness of breath and wheezing.    Musculoskeletal:  Positive for stiffness. Negative for back pain and joint pain.   Gastrointestinal:  Negative for anorexia, heartburn, nausea and vomiting.   Genitourinary:  Negative for dysuria, hematuria, hesitancy and nocturia.   Neurological:  Negative for dizziness, headaches and light-headedness.  "  Psychiatric/Behavioral:  Negative for depression and memory loss. The patient is not nervous/anxious.            Objective     /88 (BP Location: Left arm, Patient Position: Sitting)   Pulse 80   Ht 177.8 cm (70\")   Wt 110 kg (243 lb 9.6 oz)   BMI 34.95 kg/m²     Constitutional:       Appearance: Not in distress.   Eyes:      Pupils: Pupils are equal, round, and reactive to light.   HENT:      Nose: Nose normal.   Pulmonary:      Effort: Pulmonary effort is normal.      Breath sounds: Normal breath sounds.   Cardiovascular:      PMI at left midclavicular line. Normal rate. Irregular rhythm.      Murmurs: There is a systolic murmur.   Abdominal:      Palpations: Abdomen is soft.   Musculoskeletal: Normal range of motion.      Cervical back: Normal range of motion and neck supple. Skin:     General: Skin is warm and dry.   Neurological:      Mental Status: Alert.           ECG 12 Lead    Date/Time: 12/19/2023 9:45 AM  Performed by: Lolita Joshua APRN    Authorized by: Lolita Joshua APRN  Comparison: compared with previous ECG from 6/12/2023  Rhythm: atrial fibrillation  BPM: 80    Clinical impression: abnormal EKG  Comments: Normal QT               Diagnoses and all orders for this visit:    1. Paroxysmal atrial fibrillation (Primary)  -     rivaroxaban (Xarelto) 20 MG tablet; Take 1 tablet by mouth Daily.  Dispense: 90 tablet; Refill: 2  -     ECG 12 Lead    2. Essential hypertension  -     amLODIPine (NORVASC) 5 MG tablet; Take 1 tablet daily and extra 1/2 to 1 tablet if needed for increased /90  Dispense: 120 tablet; Refill: 3    3. Hypercholesteremia    4. Asymptomatic PVD (peripheral vascular disease)    5. Obesity (BMI 30.0-34.9)    Other orders  -     dilTIAZem CD (Cardizem CD) 120 MG 24 hr capsule; Take 1 capsule by mouth Daily.  Dispense: 30 capsule; Refill: 1  -     losartan-hydrochlorothiazide (HYZAAR) 100-25 MG per tablet; Take 1 tablet by mouth Daily.  Dispense: 90 tablet; Refill: " 2  -     sotalol (Betapace) 120 MG tablet; Take 1 tablet by mouth 2 (Two) Times a Day.  Dispense: 180 tablet; Refill: 2  -     rosuvastatin (CRESTOR) 5 MG tablet; Take 1 tablet by mouth Daily. Takes 1 tablet every other day  Dispense: 90 tablet; Refill: 2             PAF: EKG done today for sotalol and xarelto therapy showed afib with controlled rate, patient reports he had to increase sotalol to 120mg BID for rate control.  He will continue same. We will add cardizem therapy at 120mg daily to aid in rhythm management and BP control. Limited caffeine diet urged. Xarelto continued as bleed and bruise denied. Patient to return on TH afternoon for repeat EKG and BP check.     HTN: BP is elevated. No adjustment to current hyzaar or norvasc therapy urged. If BP should drop with cardizem addition, patient encouraged to decrease norvasc to 1/2 tablet. Limited sodium intake encouraged.     Hyperlipidemia: On statin therapy with crestor. Patient tolerates well. Limited carb, calories, and fried/fatty food intake encouraged. Can we have labs for review?     Cardiac status: stable. Most recent workup 2020 neg for concerns, no repeat advised. ASA continued.     PVD: Claudication/ulceration denied. Continue ASA and xarelto.     Refills per request.     BMI noted at 34.95, good cardiac diet encouraged.      6 month follow up recommended, or sooner if needed              Problems Addressed this Visit          Cardiac and Vasculature    Paroxysmal atrial fibrillation - Primary    Relevant Medications    dilTIAZem CD (Cardizem CD) 120 MG 24 hr capsule    amLODIPine (NORVASC) 5 MG tablet    sotalol (Betapace) 120 MG tablet    rivaroxaban (Xarelto) 20 MG tablet    Other Relevant Orders    ECG 12 Lead    Essential hypertension    Relevant Medications    dilTIAZem CD (Cardizem CD) 120 MG 24 hr capsule    amLODIPine (NORVASC) 5 MG tablet    losartan-hydrochlorothiazide (HYZAAR) 100-25 MG per tablet    sotalol (Betapace) 120 MG tablet     Hypercholesteremia    Relevant Medications    rosuvastatin (CRESTOR) 5 MG tablet     Other Visit Diagnoses       Asymptomatic PVD (peripheral vascular disease)        Obesity (BMI 30.0-34.9)              Diagnoses         Codes Comments    Paroxysmal atrial fibrillation    -  Primary ICD-10-CM: I48.0  ICD-9-CM: 427.31     Essential hypertension     ICD-10-CM: I10  ICD-9-CM: 401.9     Hypercholesteremia     ICD-10-CM: E78.00  ICD-9-CM: 272.0     Asymptomatic PVD (peripheral vascular disease)     ICD-10-CM: I73.9  ICD-9-CM: 443.9     Obesity (BMI 30.0-34.9)     ICD-10-CM: E66.9  ICD-9-CM: 278.00                        Electronically signed by Lolita Joshua, FARRAH December 19, 2023 11:08 EST

## 2023-12-21 ENCOUNTER — TELEPHONE (OUTPATIENT)
Dept: CARDIOLOGY | Facility: CLINIC | Age: 73
End: 2023-12-21

## 2023-12-21 ENCOUNTER — CLINICAL SUPPORT (OUTPATIENT)
Dept: CARDIOLOGY | Facility: CLINIC | Age: 73
End: 2023-12-21
Payer: COMMERCIAL

## 2023-12-21 DIAGNOSIS — I48.0 PAF (PAROXYSMAL ATRIAL FIBRILLATION): Primary | ICD-10-CM

## 2023-12-21 PROCEDURE — 93000 ELECTROCARDIOGRAM COMPLETE: CPT | Performed by: NURSE PRACTITIONER

## 2023-12-21 NOTE — TELEPHONE ENCOUNTER
Patient still in afib. Please decrease amlodipine to 1/2 tablet. Continue cardizem at same. I think he is going out of town? If asymptomatic, continue same. Come after break for repeat EKG.

## 2023-12-21 NOTE — PROGRESS NOTES
Procedure     ECG 12 Lead    Date/Time: 12/21/2023 3:55 PM  Performed by: Lolita Joshua APRN    Authorized by: Lolita Joshua APRN  Comparison: compared with previous ECG from 12/19/2023  Similar to previous ECG  Rhythm: atrial fibrillation  BPM: 83    Clinical impression: abnormal EKG  Comments: Normal QT

## 2023-12-22 NOTE — TELEPHONE ENCOUNTER
Spoke with pt, he is good except for BP is running a little low.  Pt is going to lower norvasc and see if helps BP.  Pt scheduled for 1/2/24 @ 3pm for repeat EKG.

## 2024-01-02 ENCOUNTER — CLINICAL SUPPORT (OUTPATIENT)
Dept: CARDIOLOGY | Facility: CLINIC | Age: 74
End: 2024-01-02
Payer: COMMERCIAL

## 2024-01-02 ENCOUNTER — TELEPHONE (OUTPATIENT)
Dept: CARDIOLOGY | Facility: CLINIC | Age: 74
End: 2024-01-02

## 2024-01-02 DIAGNOSIS — I48.19 PERSISTENT ATRIAL FIBRILLATION: Primary | ICD-10-CM

## 2024-01-02 PROCEDURE — 93000 ELECTROCARDIOGRAM COMPLETE: CPT | Performed by: NURSE PRACTITIONER

## 2024-01-02 NOTE — TELEPHONE ENCOUNTER
Please let him know, still in afib. We can try to cardiovert him to get him back into rhythm. Would he like to be scheduled?

## 2024-01-02 NOTE — PROGRESS NOTES
Procedure     ECG 12 Lead    Date/Time: 1/2/2024 3:22 PM  Performed by: Lolita Joshua APRN    Authorized by: Lolita Joshua APRN  Comparison: compared with previous ECG from 12/22/2023  Similar to previous ECG  Rhythm: atrial fibrillation  BPM: 71    Clinical impression: abnormal EKG  Comments: Normal QT

## 2024-01-03 ENCOUNTER — TELEPHONE (OUTPATIENT)
Dept: CARDIOLOGY | Facility: CLINIC | Age: 74
End: 2024-01-03
Payer: COMMERCIAL

## 2024-01-03 DIAGNOSIS — I48.19 PERSISTENT ATRIAL FIBRILLATION: Primary | ICD-10-CM

## 2024-01-12 ENCOUNTER — OUTSIDE FACILITY SERVICE (OUTPATIENT)
Dept: CARDIOLOGY | Facility: CLINIC | Age: 74
End: 2024-01-12
Payer: COMMERCIAL

## 2024-01-12 PROCEDURE — 92960 CARDIOVERSION ELECTRIC EXT: CPT | Performed by: INTERNAL MEDICINE

## 2024-01-15 ENCOUNTER — CLINICAL SUPPORT (OUTPATIENT)
Dept: CARDIOLOGY | Facility: CLINIC | Age: 74
End: 2024-01-15
Payer: COMMERCIAL

## 2024-01-15 ENCOUNTER — TELEPHONE (OUTPATIENT)
Dept: CARDIOLOGY | Facility: CLINIC | Age: 74
End: 2024-01-15

## 2024-01-15 DIAGNOSIS — I48.0 PAROXYSMAL ATRIAL FIBRILLATION: ICD-10-CM

## 2024-01-15 DIAGNOSIS — R00.1 BRADYCARDIA, SINUS: Primary | ICD-10-CM

## 2024-01-15 PROCEDURE — 93000 ELECTROCARDIOGRAM COMPLETE: CPT | Performed by: INTERNAL MEDICINE

## 2024-01-15 NOTE — PROGRESS NOTES
Procedure     ECG 12 Lead    Date/Time: 1/15/2024 12:50 PM  Performed by: Jorden Law MD    Authorized by: Jorden Law MD  Comparison: compared with previous ECG from 1/2/2024  Comparison to previous ECG: Sinus bradycardia at this time  Rhythm: sinus bradycardia  Rate: bradycardic  Conduction: left anterior fascicular block  QRS axis: left  Other findings: left atrial abnormality    Clinical impression: abnormal EKG

## 2024-01-23 ENCOUNTER — TELEPHONE (OUTPATIENT)
Dept: CARDIOLOGY | Facility: CLINIC | Age: 74
End: 2024-01-23

## 2024-01-23 ENCOUNTER — CLINICAL SUPPORT (OUTPATIENT)
Dept: CARDIOLOGY | Facility: CLINIC | Age: 74
End: 2024-01-23
Payer: COMMERCIAL

## 2024-01-23 DIAGNOSIS — I48.0 PAF (PAROXYSMAL ATRIAL FIBRILLATION): Primary | ICD-10-CM

## 2024-01-23 PROCEDURE — 93000 ELECTROCARDIOGRAM COMPLETE: CPT | Performed by: NURSE PRACTITIONER

## 2024-01-23 NOTE — TELEPHONE ENCOUNTER
He is back in afib. Increase sotalol back to 40mg BID. Come back on TH afternoon for repeat EKG. If still out of rhythm, will refer to EP.

## 2024-01-23 NOTE — PROGRESS NOTES
Procedure     ECG 12 Lead    Date/Time: 1/23/2024 3:24 PM  Performed by: Lolita Joshua APRN    Authorized by: Lolita Joshua APRN  Comparison: compared with previous ECG from 1/15/2024  Comparison to previous ECG: Sinus lea  Rhythm: atrial fibrillation  BPM: 97    Clinical impression: abnormal EKG  Comments: Normal QT

## 2024-01-25 ENCOUNTER — CLINICAL SUPPORT (OUTPATIENT)
Dept: CARDIOLOGY | Facility: CLINIC | Age: 74
End: 2024-01-25
Payer: COMMERCIAL

## 2024-01-25 DIAGNOSIS — I48.0 PAF (PAROXYSMAL ATRIAL FIBRILLATION): Primary | ICD-10-CM

## 2024-01-25 PROCEDURE — 93000 ELECTROCARDIOGRAM COMPLETE: CPT | Performed by: NURSE PRACTITIONER

## 2024-01-25 RX ORDER — MAGNESIUM OXIDE 400 MG/1
400 TABLET ORAL DAILY
Qty: 30 TABLET | Refills: 11 | Status: SHIPPED | OUTPATIENT
Start: 2024-01-25

## 2024-01-25 NOTE — PROGRESS NOTES
Procedure     ECG 12 Lead    Date/Time: 1/25/2024 4:36 PM  Performed by: Lolita Joshua APRN    Authorized by: Lolita Joshua APRN  Comparison: compared with previous ECG from 1/23/2023  Similar to previous ECG  Rhythm: atrial fibrillation  BPM: 96    Clinical impression: abnormal EKG  Comments: Prolonged QT

## 2024-01-25 NOTE — TELEPHONE ENCOUNTER
Patient was made aware to keep sotalol at 40 mg BID and add magnesium 400 mg daily. Patient asked for script to be sent to pharmacy. Script is pended.

## 2024-01-25 NOTE — TELEPHONE ENCOUNTER
Please let him know, add Mag ox 400mg daily, QT slightly long. Let's give it through the weekend on the 40mg BID of sotalol. Come in Monday for repeat EKG. Then we will see what POC needs to be after.

## 2024-01-29 ENCOUNTER — TELEPHONE (OUTPATIENT)
Dept: CARDIOLOGY | Facility: CLINIC | Age: 74
End: 2024-01-29

## 2024-01-29 ENCOUNTER — CLINICAL SUPPORT (OUTPATIENT)
Dept: CARDIOLOGY | Facility: CLINIC | Age: 74
End: 2024-01-29
Payer: COMMERCIAL

## 2024-01-29 DIAGNOSIS — I48.19 PERSISTENT ATRIAL FIBRILLATION: Primary | ICD-10-CM

## 2024-01-29 DIAGNOSIS — I48.0 PAF (PAROXYSMAL ATRIAL FIBRILLATION): Primary | ICD-10-CM

## 2024-01-29 PROCEDURE — 93000 ELECTROCARDIOGRAM COMPLETE: CPT | Performed by: NURSE PRACTITIONER

## 2024-01-29 NOTE — TELEPHONE ENCOUNTER
Discuss with him being cardioverted again. Order will be in. May need to change sotalol to different antiarrythmic, possible tikosyn therapy. Continue current.

## 2024-01-29 NOTE — PROGRESS NOTES
Procedure     ECG 12 Lead    Date/Time: 1/29/2024 4:51 PM  Performed by: Lolita Joshua APRN    Authorized by: Lolita Joshua APRN  Comparison: compared with previous ECG from 1/25/2024  Similar to previous ECG  Rhythm: atrial fibrillation  BPM: 97    Clinical impression: abnormal EKG  Comments: Normal QT

## 2024-01-30 DIAGNOSIS — I48.19 PERSISTENT ATRIAL FIBRILLATION: Primary | ICD-10-CM

## 2024-01-30 NOTE — TELEPHONE ENCOUNTER
Jorden Law MD Noritis, April S, CMA  EP or Tikosyn before Cardioversion    I discussed recommendations with patient.  He prefers to proceed with EP referral.    Patient aware scheduling will be calling him to schedule EP consult.

## 2024-02-01 NOTE — PROGRESS NOTES
Cardiac Electrophysiology Outpatient Consult Note            [facility]    Consult Note     Orlando Wylie  [id number]  02/02/2024  [phone]     Primary Care Physician: Abbe Rubio MD    Referred By: Jorden Law MD    Subjective     Chief Complaint:   Diagnoses and all orders for this visit:    1. Atrial fibrillation, persistent (Primary)    2. Bradycardia, sinus    3. Long term current use of antiarrhythmic medical therapy    4. Essential hypertension    5. Long term current use of anticoagulant      Chief Complaint   Patient presents with    PAF       History of Present Illness:   Orlando Wylie is a 73 y.o. male who presents to my electrophysiology clinic for evaluation of above complaints.  Mr. Wylie moved from [place].  He had A-fib for about 10 years.  He spent much of the time in atrial fibrillation.    He was cardioverted 10 years ago once and thinks he may have felt better.    More recently he was cardioverted into sinus rhythm and thinks that perhaps he may be felt 5 or 10% better probably not more than this is also possible however that it may be mental and he did not feel any better at all he states.    He gets around reasonably well he is in A-fib today.  He is not quite aware of it but his blood pressure cuff tells him this he takes his blood thinner and tolerates it well he is retired from the [employer].    Past Medical History:   Patient Active Problem List    Diagnosis Date Noted    Atrial fibrillation, persistent 02/02/2024    Long term current use of anticoagulant 11/15/2022    Current use of long term anticoagulation 05/02/2022    Mitral valve insufficiency 05/02/2022    Bradycardia, sinus 12/08/2020    Class 2 obesity due to excess calories without serious comorbidity with body mass index (BMI) of 37.0 to 37.9 in adult 05/01/2018    Essential hypertension 04/27/2017    Hypercholesteremia 04/27/2017    Long term current use of  antiarrhythmic medical therapy 2017       Past Surgical History:   Past Surgical History:   Procedure Laterality Date    AORTAGRAM N/A 2021    Procedure: COMMON FEMORAL LEFT, RIGHT COMMON FEMORAL, AND RIGHT POSTERIOR TIBIAL ACCESS WITH AORTAGRAM, RIGHT LEG ANGIOGRAM, RIGHT POPLITEAL ARTERY ANGIOPLASTY AND STENT, IVUS OF RIGHT SUPERFICIAL FEMORAL AND POPLITEAL ARTERIES;  Surgeon: Paulo Mcduffie MD;  Location: [facility];  Service: Vascular;  Laterality: N/A;  FLOURO- 11 min, 30 sec  DOSE- 35 mL visi  MGY- 34      CARDIOVASCULAR STRESS TEST  2015    [email or web address]- [email or web address].    CARDIOVASCULAR STRESS TEST  2020    3 Min. 15 Secs. 4.6 METS. A.Fib RVR. 113% THR. BP- 153/95. EF 45%. Negative.    CARDIOVERSION  2020    Cardioverted to Sinus    CARDIOVERSION  2024    Converted with 200 J    DISTAL POPLITEAL ARTERY BYPASS  10/13/2020    below the knee    ECHO - CONVERTED  2015    @[email or web address]. Dr. Parker- EF 65%. Mild MR and AI    ECHO - CONVERTED  2020    EF 55%. LA- 5.1 Cm.Mod MR. Mild AI. RVSP- 33 mmHG. (A.Fib)    KNEE ARTHROPLASTY Bilateral     OTHER SURGICAL HISTORY  12/10/2020    Pulse O2- 84 Min of Hyoxia.    TONSILLECTOMY      TOTAL KNEE ARTHROPLASTY REVISION Bilateral     TRANSESOPHAGEAL ECHOCARDIOGRAM (SANDRITA) AND CARDIOVERSION  2012    Dr. VIGNESH ANDERSON / TRANSURETHRAL INCISION / DRAINAGE PROSTATE      with laser       Social History:   Social History     Socioeconomic History    Marital status:    Tobacco Use    Smoking status: Former     Types: Cigarettes     Quit date:      Years since quittin.1     Passive exposure: Never    Smokeless tobacco: Never   Vaping Use    Vaping Use: Never used   Substance and Sexual Activity    Alcohol use: Yes     Comment: occasional beer    Drug use: No    Sexual activity: Defer       Medications:     Current Outpatient Medications:     Acetaminophen (Tylenol) 325 MG capsule, Take  by mouth As Needed.,  "Disp: , Rfl:     amLODIPine (NORVASC) 5 MG tablet, Take 1 tablet daily and extra 1/2 to 1 tablet if needed for increased /90, Disp: 120 tablet, Rfl: 3    aspirin 81 MG chewable tablet, Chew 1 tablet Daily., Disp: , Rfl:     difluprednate (Durezol) 0.05 % ophthalmic emulsion, Administer 1 drop into the left eye 2 (two) times a day., Disp: , Rfl:     losartan-hydrochlorothiazide (HYZAAR) 100-25 MG per tablet, Take 1 tablet by mouth Daily., Disp: 90 tablet, Rfl: 2    magnesium oxide (MAG-OX) 400 MG tablet, Take 1 tablet by mouth Daily., Disp: 30 tablet, Rfl: 11    rivaroxaban (Xarelto) 20 MG tablet, Take 1 tablet by mouth Daily., Disp: 90 tablet, Rfl: 2    rosuvastatin (CRESTOR) 5 MG tablet, Take 1 tablet by mouth Daily., Disp: 90 tablet, Rfl: 2    sotalol (Betapace) 120 MG tablet, Take 1 tablet by mouth 2 (Two) Times a Day., Disp: 180 tablet, Rfl: 2    Allergies:   Allergies   Allergen Reactions    Codeine Nausea And Vomiting    Lisinopril Cough       Objective   Vital Signs:   Vitals:    02/02/24 0955   BP: 122/72   BP Location: Left arm   Patient Position: Sitting   Pulse: 94   SpO2: 96%   Weight: 111 kg (245 lb)   Height: 177.8 cm (70\")       PHYSICAL EXAM    Neck: no adenopathy, no carotid bruit, no JVD, and thyroid: not enlarged  Lungs: clear to auscultation bilaterally and no rhonchi or crackles\", ' symmetric  Heart: irregularly irregular rhythm  Abdomen: Soft, non-tender, bowel sounds normal,  no organomegaly  Extremities: extremities normal, atraumatic, no cyanosis or edema      Lab Results   Component Value Date    GLUCOSE 157 (H) 04/30/2021    CALCIUM 9.0 04/30/2021     04/30/2021    K 4.0 04/30/2021    CO2 29.0 04/30/2021     04/30/2021    BUN 15 04/30/2021    CREATININE 0.88 04/30/2021    EGFRIFNONA 85 04/30/2021    BCR 17.0 04/30/2021    ANIONGAP 9.0 04/30/2021     Lab Results   Component Value Date    WBC 8.0 11/11/2023    HGB 12.7 (L) 11/11/2023    HCT 39.2 (L) 11/11/2023    MCV 91.6 " "11/11/2023     11/11/2023     No results found for: \"INR\", \"PROTIME\"  Lab Results   Component Value Date    TSH 3.260 08/31/2020       Cardiac Testing:      I personally viewed and interpreted the patient's EKG/Telemetry/lab data    Procedures    Tobacco Cessation: N/A  Obstructive Sleep Apnea Screening: Completed    Assessment & Plan    Diagnoses and all orders for this visit:    1. Atrial fibrillation, persistent (Primary)    2. Bradycardia, sinus    3. Long term current use of antiarrhythmic medical therapy    4. Essential hypertension    5. Long term current use of anticoagulant         Diagnosis Plan   1. Atrial fibrillation, persistent  Longstanding persistent atrial fibrillation.    Questionably mildly symptomatic versus asymptomatic.    Significant left atrial enlargement.  10-year history of A-fib.  Failed amiodarone sotalol and flecainide.    This is a significant fork in the road.  1 option is to simply proceed with rate control.  Data would suggest that this is reasonable.  Especially given his few symptoms.    On the other hand rhythm control strategy would entail catheter ablation and likely 2 or 3 ablation procedures given the duration of his A-fib.  We are not encouraged by the fact that in spite of antiarrhythmic drug therapy sinus rhythm was achieved only for a few days.  His symptoms are quite slight.    At this time he is going to give some deep thought to whether he wishes to proceed with rhythm control.  The only thing to gain is symptom relief and he is really not sure he has any symptoms at all.  If he is however convinced that he will feel better in sinus rhythm he certainly would be a candidate for an ablation but again he would require probably several of these procedures to be completely clear and forthright.    He will call me in the next several days.    If we proceed with simple rate control stop sotalol reinstitute diltiazem for rate control.    If you wish to proceed with " catheter ablation we will schedule him for an A-fib ablation.      2. Bradycardia, sinus  In the context of sotalol and antiarrhythmic drug therapy.  See above discussion.      3. Long term current use of antiarrhythmic medical therapy  Sotalol for now.  QT stable.  No EKG today.      4. Essential hypertension  Blood pressure per cardiology.      5. Long term current use of anticoagulant  Long-term anticoagulation recommended.  Per cardiology.        Body mass index is 35.15 kg/m².    Obesity Counseling:    I discussed with the patient that they are obese.  We discussed that obesity is a significant risk factor for heart disease, hypertension, diabetes, other forms of health problems and indeed premature death.  We discussed that it is critical that the patient loose weight to minimize their risk of excess morbidity and mortality.        I spent 48 minutes in consultation with this patient which included more than 65% of this time in direct face-to-face counseling, physical examination and discussion of my assessment and findings and this shared decision making with the patient.  The remainder of the time not spent face-to-face was performing one, some or all of the following actions: preparing to see the patient (e.g. reviewing tests, prior clinicians' notes, etc), ordering medications, tests or procedures, coordination of care, discussion of the plan with other healthcare providers, documenting clinical information in epic as well as independently interpreting results and communication of these results to the patient family and/or caregiver(s).  Please note that this explicitly excludes time spent on other separate billable services such as performing procedures or test interpretation, when applicable.    Follow Up:       Thank you for allowing me to participate in the care of your patient. Please to not hesitate to contact me with additional questions or concerns.      Waldemar oPpe, DO, FACC, RS  Cardiac  Electrophysiologist  [facility]            Electronically signed by DUNG Latif, 02/01/24, 2:34 PM EST.

## 2024-02-02 ENCOUNTER — OFFICE VISIT (OUTPATIENT)
Dept: CARDIOLOGY | Facility: CLINIC | Age: 74
End: 2024-02-02
Payer: COMMERCIAL

## 2024-02-02 VITALS
HEART RATE: 94 BPM | OXYGEN SATURATION: 96 % | HEIGHT: 70 IN | SYSTOLIC BLOOD PRESSURE: 122 MMHG | BODY MASS INDEX: 35.07 KG/M2 | DIASTOLIC BLOOD PRESSURE: 72 MMHG | WEIGHT: 245 LBS

## 2024-02-02 DIAGNOSIS — I48.19 ATRIAL FIBRILLATION, PERSISTENT: Primary | ICD-10-CM

## 2024-02-02 DIAGNOSIS — Z79.01 LONG TERM CURRENT USE OF ANTICOAGULANT: ICD-10-CM

## 2024-02-02 DIAGNOSIS — R00.1 BRADYCARDIA, SINUS: ICD-10-CM

## 2024-02-02 DIAGNOSIS — Z79.899 LONG TERM CURRENT USE OF ANTIARRHYTHMIC MEDICAL THERAPY: ICD-10-CM

## 2024-02-02 DIAGNOSIS — I10 ESSENTIAL HYPERTENSION: ICD-10-CM

## 2024-02-02 PROBLEM — I45.4 BBB (BUNDLE BRANCH BLOCK): Status: RESOLVED | Noted: 2017-04-27 | Resolved: 2024-02-02

## 2024-02-02 PROBLEM — I48.0 PAROXYSMAL ATRIAL FIBRILLATION: Status: RESOLVED | Noted: 2017-04-27 | Resolved: 2024-02-02

## 2024-02-09 ENCOUNTER — TELEPHONE (OUTPATIENT)
Dept: CARDIOLOGY | Facility: CLINIC | Age: 74
End: 2024-02-09
Payer: COMMERCIAL

## 2024-02-13 ENCOUNTER — TELEPHONE (OUTPATIENT)
Dept: CARDIOLOGY | Facility: CLINIC | Age: 74
End: 2024-02-13
Payer: COMMERCIAL

## 2024-02-20 ENCOUNTER — TELEPHONE (OUTPATIENT)
Dept: CARDIOLOGY | Facility: CLINIC | Age: 74
End: 2024-02-20
Payer: COMMERCIAL

## 2024-02-20 DIAGNOSIS — I48.19 ATRIAL FIBRILLATION, PERSISTENT: Primary | ICD-10-CM

## 2024-02-20 NOTE — TELEPHONE ENCOUNTER
Waldemar Pope DO Stewart, Whitney, RegSched Rep  Cc: Oh Rodriguez RN; Barbara Ramirez RN  Rhythmia  General anesthesia  Preop CT  Do not hold anticoagulation  Hold antiarrhythmic drug for 3 days          Previous Messages       ----- Message -----  From: Darlin Zuniga RegSched Rep  Sent: 2/13/2024   3:48 PM EST  To: Barbara Ramirez RN; Waldemar Pope DO; *  Subject: PVA                                              This patient was seen on 2/2/24 and is wanting to proceed with a afib ablations. Any meds to hold? Please advise. He also expressed that he would like to stay the night for observation

## 2024-02-28 ENCOUNTER — PREP FOR SURGERY (OUTPATIENT)
Dept: OTHER | Facility: HOSPITAL | Age: 74
End: 2024-02-28
Payer: COMMERCIAL

## 2024-02-28 DIAGNOSIS — I48.19 ATRIAL FIBRILLATION, PERSISTENT: Primary | ICD-10-CM

## 2024-02-28 RX ORDER — SODIUM CHLORIDE 9 MG/ML
40 INJECTION, SOLUTION INTRAVENOUS AS NEEDED
OUTPATIENT
Start: 2024-02-28

## 2024-02-28 RX ORDER — NITROGLYCERIN 0.4 MG/1
0.4 TABLET SUBLINGUAL
OUTPATIENT
Start: 2024-02-28

## 2024-02-28 RX ORDER — ONDANSETRON 2 MG/ML
4 INJECTION INTRAMUSCULAR; INTRAVENOUS EVERY 6 HOURS PRN
OUTPATIENT
Start: 2024-02-28

## 2024-02-28 RX ORDER — ACETAMINOPHEN 325 MG/1
650 TABLET ORAL EVERY 4 HOURS PRN
OUTPATIENT
Start: 2024-02-28

## 2024-03-29 NOTE — NURSING NOTE
" PRE-PVA ASSESSMENT  Orlando Wylie 1950   [address] [facility] [address]   [phone]      Referral Source: Jorden Law MD   Information obtained from: [x] Medical record review  [x] Patient report  Scheduled for: PVA on 4/11/24 with Dr. Pope  Allergies   Allergen Reactions    Codeine Nausea And Vomiting    Lisinopril Cough       AFib Specific History:  AFIBTYPE: persistent    CHADS-VASc Risk Assessment               3 Total Score    1 Hypertension    1 Vascular Disease    1 Age 65-74    Criteria that do not apply:    CHF    Age >/= 75    DM    PRIOR STROKE/TIA/THROMBO    Sex: Female            Anticoagulation: Xarelto 20 mg daily and 81 mg ASA NO MISSED DOSES   Cardioversion x 3  Failed AAD(s): sotalol  Prior Ablation: No     Is Mr. Wylie aware of his AFib? Yes   Onset: ~10 years       Symptoms:   [] Palpitations:    [] Chest Discomfort:    [] Dizziness:    [] Presyncope:    [] Lightheadedness:   [] Syncope:    [x] Fatigue:    [] Other:     [] Short of Breath:     Last Echo(s):  [x] 8/31/20  The left ventricular cavity is mildly dilated.  Left ventricular wall thickness is consistent with mild-to-moderate concentric hypertrophy.  Estimated EF appears to be in the range of 51 - 55%.  Left ventricular diastolic dysfunction (grade I) consistent with impaired relaxation.  Left atrial cavity size is moderately dilated. 5.1 Cm  No aortic valve stenosis is present.  Mild aortic valve regurgitation is present.  Mild-to-moderate mitral valve regurgitation is present  Mild tricuspid valve regurgitation is present. RVSP- 33 mmHg     Past medical History:   [] Diabetes   No              No results found for: \"HGBA1C\"       [] HYPOthyroidism No   [] HYPERthyroidism No          TSH   Date Value Ref Range Status   08/31/2020 3.260 0.270 - 4.200 uIU/mL Final     [x] HTN        [x] Tx norvasc      [] Heart Failure    No   [] CVA    No                            [] TIA   No       [] Ischemic         [] " Hemorrhagic         [] Nonischemic         [] Embolic        [] Diastolic    [] CAD  No        [] MI  No           [x] Dyslipidemia  [] Statin indicated    [x] Ischemic Evaluation       [x] Stress Test: 8/31/20   Baseline atrial fibrillation with rapid ventricular response  Mild to moderate impairment of exercise tolerance  Increased chronotropic response  Mild increase in blood pressure response  Diaphragmatic attenuation artifact is present.  Left ventricular ejection fraction is mildly reduced (Calculated EF = 45%).  SPECT images shows hypoperfusion of the inferior wall seen more during rest rather than stress. (Reverse redistribution.)  No definite evidence of ischemia seen.       [] Heart Cath: No     [x] Sleep Apnea Diagnosed, possible. Patient will monitor and call for a reval after monitoring     [x] Obesity       [x] BMI 35.15        [x] Weight reduction discussed with Mr. Wylie         [] Nutrition Consult            [x] Declined by Mr. Wylie     Summary of Patient Contact:    I spoke with Mr. Wylie about his upcoming PVA.   He was well informed about the procedure from prior discussion with Dr. Pope and from reading the provided literature.  We discussed the procedure at length including risks, anesthesia, intra-op procedures, recovery, bedrest, sheath removal, discharge criteria, normal post-procedure expectations, and success rates.  I answered a few remaining questions. Mr. Wylie verbalized understanding and he is ready to proceed.       Amanda Betancourt RN

## 2024-04-04 ENCOUNTER — HOSPITAL ENCOUNTER (OUTPATIENT)
Dept: CT IMAGING | Facility: HOSPITAL | Age: 74
Discharge: HOME | End: 2024-04-04
Payer: COMMERCIAL

## 2024-04-04 ENCOUNTER — PRE-ADMISSION TESTING (OUTPATIENT)
Dept: PREADMISSION TESTING | Facility: HOSPITAL | Age: 74
End: 2024-04-04
Payer: COMMERCIAL

## 2024-04-04 DIAGNOSIS — I48.19 ATRIAL FIBRILLATION, PERSISTENT: ICD-10-CM

## 2024-04-04 DIAGNOSIS — I48.19 ATRIAL FIBRILLATION, PERSISTENT: Primary | ICD-10-CM

## 2024-04-04 LAB
ANION GAP SERPL CALCULATED.3IONS-SCNC: 6 MMOL/L (ref 5–15)
BUN SERPL-MCNC: 16 MG/DL (ref 8–23)
BUN/CREAT SERPL: 15.2 (ref 7–25)
CALCIUM SPEC-SCNC: 9.7 MG/DL (ref 8.6–10.5)
CHLORIDE SERPL-SCNC: 97 MMOL/L (ref 98–107)
CO2 SERPL-SCNC: 32 MMOL/L (ref 22–29)
CREAT SERPL-MCNC: 1.05 MG/DL (ref 0.76–1.27)
DEPRECATED RDW RBC AUTO: 42.1 FL (ref 37–54)
EGFRCR SERPLBLD CKD-EPI 2021: 74.5 ML/MIN/1.73
ERYTHROCYTE [DISTWIDTH] IN BLOOD BY AUTOMATED COUNT: 12.8 % (ref 12.3–15.4)
GLUCOSE SERPL-MCNC: 74 MG/DL (ref 65–99)
HCT VFR BLD AUTO: 46.6 % (ref 37.5–51)
HGB BLD-MCNC: 15.4 G/DL (ref 13–17.7)
MCH RBC QN AUTO: 29.6 PG (ref 26.6–33)
MCHC RBC AUTO-ENTMCNC: 33 G/DL (ref 31.5–35.7)
MCV RBC AUTO: 89.4 FL (ref 79–97)
PLATELET # BLD AUTO: 278 10*3/MM3 (ref 140–450)
PMV BLD AUTO: 10.3 FL (ref 6–12)
POTASSIUM SERPL-SCNC: 3.7 MMOL/L (ref 3.5–5.2)
RBC # BLD AUTO: 5.21 10*6/MM3 (ref 4.14–5.8)
SODIUM SERPL-SCNC: 135 MMOL/L (ref 136–145)
TSH SERPL DL<=0.05 MIU/L-ACNC: 2.45 UIU/ML (ref 0.27–4.2)
WBC NRBC COR # BLD AUTO: 5.27 10*3/MM3 (ref 3.4–10.8)

## 2024-04-04 PROCEDURE — 71275 CT ANGIOGRAPHY CHEST: CPT

## 2024-04-04 PROCEDURE — 36415 COLL VENOUS BLD VENIPUNCTURE: CPT

## 2024-04-04 PROCEDURE — 80048 BASIC METABOLIC PNL TOTAL CA: CPT

## 2024-04-04 PROCEDURE — 85027 COMPLETE CBC AUTOMATED: CPT

## 2024-04-04 PROCEDURE — 84443 ASSAY THYROID STIM HORMONE: CPT

## 2024-04-04 PROCEDURE — 25510000001 IOPAMIDOL PER 1 ML: Performed by: INTERNAL MEDICINE

## 2024-04-04 RX ORDER — THIAMINE HCL 100 MG
1 TABLET ORAL DAILY
COMMUNITY

## 2024-04-04 RX ADMIN — IOPAMIDOL 80 ML: 755 INJECTION, SOLUTION INTRAVENOUS at 13:49

## 2024-04-10 ENCOUNTER — ANESTHESIA EVENT (OUTPATIENT)
Dept: CARDIOLOGY | Facility: HOSPITAL | Age: 74
End: 2024-04-10
Payer: COMMERCIAL

## 2024-04-11 ENCOUNTER — ANESTHESIA (OUTPATIENT)
Dept: CARDIOLOGY | Facility: HOSPITAL | Age: 74
End: 2024-04-11
Payer: COMMERCIAL

## 2024-04-11 ENCOUNTER — HOSPITAL ENCOUNTER (OUTPATIENT)
Facility: HOSPITAL | Age: 74
Discharge: HOME | End: 2024-04-11
Attending: INTERNAL MEDICINE | Admitting: INTERNAL MEDICINE
Payer: COMMERCIAL

## 2024-04-11 VITALS
OXYGEN SATURATION: 91 % | HEIGHT: 71 IN | RESPIRATION RATE: 10 BRPM | SYSTOLIC BLOOD PRESSURE: 97 MMHG | HEART RATE: 79 BPM | WEIGHT: 242.6 LBS | TEMPERATURE: 97.1 F | BODY MASS INDEX: 33.96 KG/M2 | DIASTOLIC BLOOD PRESSURE: 76 MMHG

## 2024-04-11 DIAGNOSIS — I48.19 ATRIAL FIBRILLATION, PERSISTENT: ICD-10-CM

## 2024-04-11 PROBLEM — I48.0 PAROXYSMAL ATRIAL FIBRILLATION: Status: ACTIVE | Noted: 2024-04-11

## 2024-04-11 LAB
ACT BLD: 336 SECONDS (ref 82–152)
ACT BLD: 347 SECONDS (ref 82–152)
ACT BLD: 390 SECONDS (ref 82–152)
ACT BLD: 401 SECONDS (ref 82–152)
BUN BLDA-MCNC: 16 MG/DL (ref 8–26)
CA-I BLDA-SCNC: 1.28 MMOL/L (ref 1.2–1.32)
CHLORIDE BLDA-SCNC: 98 MMOL/L (ref 98–109)
CO2 BLDA-SCNC: 29 MMOL/L (ref 24–29)
CREAT BLDA-MCNC: 1.2 MG/DL (ref 0.6–1.3)
EGFRCR SERPLBLD CKD-EPI 2021: 63.5 ML/MIN/1.73
GLUCOSE BLDC GLUCOMTR-MCNC: 98 MG/DL (ref 70–130)
HCT VFR BLDA CALC: 45 % (ref 38–51)
HGB BLDA-MCNC: 15.3 G/DL (ref 12–17)
POTASSIUM BLDA-SCNC: 3.9 MMOL/L (ref 3.5–4.9)
SODIUM BLD-SCNC: 140 MMOL/L (ref 138–146)

## 2024-04-11 PROCEDURE — 93623 PRGRMD STIMJ&PACG IV RX NFS: CPT | Performed by: INTERNAL MEDICINE

## 2024-04-11 PROCEDURE — 25010000002 SUGAMMADEX 500 MG/5ML SOLUTION: Performed by: NURSE ANESTHETIST, CERTIFIED REGISTERED

## 2024-04-11 PROCEDURE — C1766 INTRO/SHEATH,STRBLE,NON-PEEL: HCPCS | Performed by: INTERNAL MEDICINE

## 2024-04-11 PROCEDURE — 25810000003 SODIUM CHLORIDE 0.9 % SOLUTION: Performed by: INTERNAL MEDICINE

## 2024-04-11 PROCEDURE — 25010000002 HEPARIN (PORCINE) PER 1000 UNITS: Performed by: INTERNAL MEDICINE

## 2024-04-11 PROCEDURE — 25010000002 FUROSEMIDE PER 20 MG: Performed by: INTERNAL MEDICINE

## 2024-04-11 PROCEDURE — 93656 COMPRE EP EVAL ABLTJ ATR FIB: CPT | Performed by: INTERNAL MEDICINE

## 2024-04-11 PROCEDURE — 93657 TX L/R ATRIAL FIB ADDL: CPT | Performed by: INTERNAL MEDICINE

## 2024-04-11 PROCEDURE — 25010000002 DEXAMETHASONE PER 1 MG: Performed by: NURSE ANESTHETIST, CERTIFIED REGISTERED

## 2024-04-11 PROCEDURE — C1769 GUIDE WIRE: HCPCS | Performed by: INTERNAL MEDICINE

## 2024-04-11 PROCEDURE — C1733 CATH, EP, OTHR THAN COOL-TIP: HCPCS | Performed by: INTERNAL MEDICINE

## 2024-04-11 PROCEDURE — 25010000002 PROPOFOL 10 MG/ML EMULSION: Performed by: NURSE ANESTHETIST, CERTIFIED REGISTERED

## 2024-04-11 PROCEDURE — 85014 HEMATOCRIT: CPT

## 2024-04-11 PROCEDURE — 25010000002 LIDOCAINE 1 % SOLUTION: Performed by: INTERNAL MEDICINE

## 2024-04-11 PROCEDURE — C1760 CLOSURE DEV, VASC: HCPCS | Performed by: INTERNAL MEDICINE

## 2024-04-11 PROCEDURE — C1893 INTRO/SHEATH, FIXED,NON-PEEL: HCPCS | Performed by: INTERNAL MEDICINE

## 2024-04-11 PROCEDURE — C1894 INTRO/SHEATH, NON-LASER: HCPCS | Performed by: INTERNAL MEDICINE

## 2024-04-11 PROCEDURE — S0260 H&P FOR SURGERY: HCPCS | Performed by: PHYSICIAN ASSISTANT

## 2024-04-11 PROCEDURE — 25010000002 ONDANSETRON PER 1 MG: Performed by: NURSE ANESTHETIST, CERTIFIED REGISTERED

## 2024-04-11 PROCEDURE — 25810000003 SODIUM CHLORIDE 0.9 % SOLUTION 250 ML FLEX CONT: Performed by: NURSE ANESTHETIST, CERTIFIED REGISTERED

## 2024-04-11 PROCEDURE — 93622 COMP EP EVAL L VENTR PAC&REC: CPT | Performed by: INTERNAL MEDICINE

## 2024-04-11 PROCEDURE — 25010000002 PROTAMINE SULFATE PER 10 MG: Performed by: INTERNAL MEDICINE

## 2024-04-11 PROCEDURE — 80047 BASIC METABLC PNL IONIZED CA: CPT

## 2024-04-11 PROCEDURE — C1730 CATH, EP, 19 OR FEW ELECT: HCPCS | Performed by: INTERNAL MEDICINE

## 2024-04-11 PROCEDURE — 85347 COAGULATION TIME ACTIVATED: CPT

## 2024-04-11 PROCEDURE — 25010000002 ADENOSINE PER 6 MG: Performed by: INTERNAL MEDICINE

## 2024-04-11 PROCEDURE — 25010000002 PHENYLEPHRINE 10 MG/ML SOLUTION 1 ML VIAL: Performed by: NURSE ANESTHETIST, CERTIFIED REGISTERED

## 2024-04-11 PROCEDURE — 25810000003 SODIUM CHLORIDE 0.9 % SOLUTION: Performed by: NURSE ANESTHETIST, CERTIFIED REGISTERED

## 2024-04-11 PROCEDURE — C1759 CATH, INTRA ECHOCARDIOGRAPHY: HCPCS | Performed by: INTERNAL MEDICINE

## 2024-04-11 PROCEDURE — C1732 CATH, EP, DIAG/ABL, 3D/VECT: HCPCS | Performed by: INTERNAL MEDICINE

## 2024-04-11 RX ORDER — FAMOTIDINE 10 MG/ML
20 INJECTION, SOLUTION INTRAVENOUS ONCE
Status: DISCONTINUED | OUTPATIENT
Start: 2024-04-11 | End: 2024-04-11 | Stop reason: HOSPADM

## 2024-04-11 RX ORDER — HEPARIN SODIUM 10000 [USP'U]/100ML
INJECTION, SOLUTION INTRAVENOUS
Status: DISCONTINUED | OUTPATIENT
Start: 2024-04-11 | End: 2024-04-11 | Stop reason: HOSPADM

## 2024-04-11 RX ORDER — ROCURONIUM BROMIDE 10 MG/ML
INJECTION, SOLUTION INTRAVENOUS AS NEEDED
Status: DISCONTINUED | OUTPATIENT
Start: 2024-04-11 | End: 2024-04-11 | Stop reason: SURG

## 2024-04-11 RX ORDER — LIDOCAINE HYDROCHLORIDE 10 MG/ML
0.5 INJECTION, SOLUTION EPIDURAL; INFILTRATION; INTRACAUDAL; PERINEURAL ONCE AS NEEDED
Status: DISCONTINUED | OUTPATIENT
Start: 2024-04-11 | End: 2024-04-11 | Stop reason: HOSPADM

## 2024-04-11 RX ORDER — NITROGLYCERIN 0.4 MG/1
0.4 TABLET SUBLINGUAL
Status: DISCONTINUED | OUTPATIENT
Start: 2024-04-11 | End: 2024-04-11 | Stop reason: HOSPADM

## 2024-04-11 RX ORDER — HYDRALAZINE HYDROCHLORIDE 20 MG/ML
5 INJECTION INTRAMUSCULAR; INTRAVENOUS
Status: DISCONTINUED | OUTPATIENT
Start: 2024-04-11 | End: 2024-04-11 | Stop reason: HOSPADM

## 2024-04-11 RX ORDER — PROTAMINE SULFATE 10 MG/ML
INJECTION, SOLUTION INTRAVENOUS
Status: DISCONTINUED | OUTPATIENT
Start: 2024-04-11 | End: 2024-04-11 | Stop reason: HOSPADM

## 2024-04-11 RX ORDER — SODIUM CHLORIDE 9 MG/ML
40 INJECTION, SOLUTION INTRAVENOUS AS NEEDED
Status: DISCONTINUED | OUTPATIENT
Start: 2024-04-11 | End: 2024-04-11 | Stop reason: HOSPADM

## 2024-04-11 RX ORDER — ADENOSINE 3 MG/ML
INJECTION, SOLUTION INTRAVENOUS
Status: DISCONTINUED | OUTPATIENT
Start: 2024-04-11 | End: 2024-04-11 | Stop reason: HOSPADM

## 2024-04-11 RX ORDER — IPRATROPIUM BROMIDE AND ALBUTEROL SULFATE 2.5; .5 MG/3ML; MG/3ML
3 SOLUTION RESPIRATORY (INHALATION) ONCE AS NEEDED
Status: DISCONTINUED | OUTPATIENT
Start: 2024-04-11 | End: 2024-04-11 | Stop reason: HOSPADM

## 2024-04-11 RX ORDER — BUPIVACAINE HCL/0.9 % NACL/PF 0.125 %
PLASTIC BAG, INJECTION (ML) EPIDURAL AS NEEDED
Status: DISCONTINUED | OUTPATIENT
Start: 2024-04-11 | End: 2024-04-11 | Stop reason: SURG

## 2024-04-11 RX ORDER — SODIUM CHLORIDE 9 MG/ML
INJECTION, SOLUTION INTRAVENOUS CONTINUOUS PRN
Status: DISCONTINUED | OUTPATIENT
Start: 2024-04-11 | End: 2024-04-11 | Stop reason: SURG

## 2024-04-11 RX ORDER — SODIUM CHLORIDE 0.9 % (FLUSH) 0.9 %
10 SYRINGE (ML) INJECTION AS NEEDED
Status: DISCONTINUED | OUTPATIENT
Start: 2024-04-11 | End: 2024-04-11 | Stop reason: HOSPADM

## 2024-04-11 RX ORDER — HYDROCODONE BITARTRATE AND ACETAMINOPHEN 5; 325 MG/1; MG/1
1 TABLET ORAL ONCE AS NEEDED
Status: DISCONTINUED | OUTPATIENT
Start: 2024-04-11 | End: 2024-04-11 | Stop reason: HOSPADM

## 2024-04-11 RX ORDER — LABETALOL HYDROCHLORIDE 5 MG/ML
5 INJECTION, SOLUTION INTRAVENOUS
Status: DISCONTINUED | OUTPATIENT
Start: 2024-04-11 | End: 2024-04-11 | Stop reason: HOSPADM

## 2024-04-11 RX ORDER — MIDAZOLAM HYDROCHLORIDE 1 MG/ML
0.5 INJECTION INTRAMUSCULAR; INTRAVENOUS
Status: DISCONTINUED | OUTPATIENT
Start: 2024-04-11 | End: 2024-04-11 | Stop reason: HOSPADM

## 2024-04-11 RX ORDER — PROPOFOL 10 MG/ML
VIAL (ML) INTRAVENOUS AS NEEDED
Status: DISCONTINUED | OUTPATIENT
Start: 2024-04-11 | End: 2024-04-11 | Stop reason: SURG

## 2024-04-11 RX ORDER — FENTANYL CITRATE 50 UG/ML
50 INJECTION, SOLUTION INTRAMUSCULAR; INTRAVENOUS
Status: DISCONTINUED | OUTPATIENT
Start: 2024-04-11 | End: 2024-04-11 | Stop reason: HOSPADM

## 2024-04-11 RX ORDER — SODIUM CHLORIDE 0.9 % (FLUSH) 0.9 %
3 SYRINGE (ML) INJECTION EVERY 12 HOURS SCHEDULED
Status: DISCONTINUED | OUTPATIENT
Start: 2024-04-11 | End: 2024-04-11 | Stop reason: HOSPADM

## 2024-04-11 RX ORDER — LIDOCAINE HYDROCHLORIDE 10 MG/ML
INJECTION, SOLUTION INFILTRATION; PERINEURAL
Status: DISCONTINUED | OUTPATIENT
Start: 2024-04-11 | End: 2024-04-11 | Stop reason: HOSPADM

## 2024-04-11 RX ORDER — PROMETHAZINE HYDROCHLORIDE 25 MG/1
25 SUPPOSITORY RECTAL ONCE AS NEEDED
Status: DISCONTINUED | OUTPATIENT
Start: 2024-04-11 | End: 2024-04-11 | Stop reason: HOSPADM

## 2024-04-11 RX ORDER — SODIUM CHLORIDE 9 MG/ML
INJECTION, SOLUTION INTRAVENOUS
Status: COMPLETED | OUTPATIENT
Start: 2024-04-11 | End: 2024-04-11

## 2024-04-11 RX ORDER — ONDANSETRON 2 MG/ML
INJECTION INTRAMUSCULAR; INTRAVENOUS AS NEEDED
Status: DISCONTINUED | OUTPATIENT
Start: 2024-04-11 | End: 2024-04-11 | Stop reason: SURG

## 2024-04-11 RX ORDER — SODIUM CHLORIDE 0.9 % (FLUSH) 0.9 %
10 SYRINGE (ML) INJECTION EVERY 12 HOURS SCHEDULED
Status: DISCONTINUED | OUTPATIENT
Start: 2024-04-11 | End: 2024-04-11 | Stop reason: HOSPADM

## 2024-04-11 RX ORDER — LIDOCAINE HYDROCHLORIDE 10 MG/ML
INJECTION, SOLUTION EPIDURAL; INFILTRATION; INTRACAUDAL; PERINEURAL AS NEEDED
Status: DISCONTINUED | OUTPATIENT
Start: 2024-04-11 | End: 2024-04-11 | Stop reason: SURG

## 2024-04-11 RX ORDER — DROPERIDOL 2.5 MG/ML
0.62 INJECTION, SOLUTION INTRAMUSCULAR; INTRAVENOUS ONCE AS NEEDED
Status: DISCONTINUED | OUTPATIENT
Start: 2024-04-11 | End: 2024-04-11 | Stop reason: HOSPADM

## 2024-04-11 RX ORDER — SODIUM CHLORIDE 0.9 % (FLUSH) 0.9 %
3-10 SYRINGE (ML) INJECTION AS NEEDED
Status: DISCONTINUED | OUTPATIENT
Start: 2024-04-11 | End: 2024-04-11 | Stop reason: HOSPADM

## 2024-04-11 RX ORDER — DROPERIDOL 2.5 MG/ML
0.62 INJECTION, SOLUTION INTRAMUSCULAR; INTRAVENOUS
Status: DISCONTINUED | OUTPATIENT
Start: 2024-04-11 | End: 2024-04-11 | Stop reason: HOSPADM

## 2024-04-11 RX ORDER — ONDANSETRON 2 MG/ML
4 INJECTION INTRAMUSCULAR; INTRAVENOUS ONCE AS NEEDED
Status: DISCONTINUED | OUTPATIENT
Start: 2024-04-11 | End: 2024-04-11 | Stop reason: HOSPADM

## 2024-04-11 RX ORDER — NALOXONE HCL 0.4 MG/ML
0.4 VIAL (ML) INJECTION AS NEEDED
Status: DISCONTINUED | OUTPATIENT
Start: 2024-04-11 | End: 2024-04-11 | Stop reason: HOSPADM

## 2024-04-11 RX ORDER — SODIUM CHLORIDE, SODIUM LACTATE, POTASSIUM CHLORIDE, CALCIUM CHLORIDE 600; 310; 30; 20 MG/100ML; MG/100ML; MG/100ML; MG/100ML
9 INJECTION, SOLUTION INTRAVENOUS CONTINUOUS
Status: DISCONTINUED | OUTPATIENT
Start: 2024-04-11 | End: 2024-04-11 | Stop reason: HOSPADM

## 2024-04-11 RX ORDER — ONDANSETRON 2 MG/ML
4 INJECTION INTRAMUSCULAR; INTRAVENOUS EVERY 6 HOURS PRN
Status: DISCONTINUED | OUTPATIENT
Start: 2024-04-11 | End: 2024-04-11 | Stop reason: HOSPADM

## 2024-04-11 RX ORDER — PROMETHAZINE HYDROCHLORIDE 25 MG/1
25 TABLET ORAL ONCE AS NEEDED
Status: DISCONTINUED | OUTPATIENT
Start: 2024-04-11 | End: 2024-04-11 | Stop reason: HOSPADM

## 2024-04-11 RX ORDER — HYDROMORPHONE HYDROCHLORIDE 1 MG/ML
0.5 INJECTION, SOLUTION INTRAMUSCULAR; INTRAVENOUS; SUBCUTANEOUS
Status: DISCONTINUED | OUTPATIENT
Start: 2024-04-11 | End: 2024-04-11 | Stop reason: HOSPADM

## 2024-04-11 RX ORDER — HEPARIN SODIUM 1000 [USP'U]/ML
INJECTION, SOLUTION INTRAVENOUS; SUBCUTANEOUS
Status: DISCONTINUED | OUTPATIENT
Start: 2024-04-11 | End: 2024-04-11 | Stop reason: HOSPADM

## 2024-04-11 RX ORDER — ACETAMINOPHEN 325 MG/1
650 TABLET ORAL EVERY 4 HOURS PRN
Status: DISCONTINUED | OUTPATIENT
Start: 2024-04-11 | End: 2024-04-11 | Stop reason: HOSPADM

## 2024-04-11 RX ORDER — FAMOTIDINE 20 MG/1
20 TABLET, FILM COATED ORAL ONCE
Status: COMPLETED | OUTPATIENT
Start: 2024-04-11 | End: 2024-04-11

## 2024-04-11 RX ORDER — FUROSEMIDE 10 MG/ML
INJECTION INTRAMUSCULAR; INTRAVENOUS
Status: DISCONTINUED | OUTPATIENT
Start: 2024-04-11 | End: 2024-04-11 | Stop reason: HOSPADM

## 2024-04-11 RX ORDER — DEXAMETHASONE SODIUM PHOSPHATE 4 MG/ML
INJECTION, SOLUTION INTRA-ARTICULAR; INTRALESIONAL; INTRAMUSCULAR; INTRAVENOUS; SOFT TISSUE AS NEEDED
Status: DISCONTINUED | OUTPATIENT
Start: 2024-04-11 | End: 2024-04-11 | Stop reason: SURG

## 2024-04-11 RX ADMIN — SUGAMMADEX 300 MG: 100 INJECTION, SOLUTION INTRAVENOUS at 11:12

## 2024-04-11 RX ADMIN — ONDANSETRON 4 MG: 2 INJECTION INTRAMUSCULAR; INTRAVENOUS at 11:12

## 2024-04-11 RX ADMIN — PHENYLEPHRINE HYDROCHLORIDE 0.2 MCG/KG/MIN: 10 INJECTION INTRAVENOUS at 09:34

## 2024-04-11 RX ADMIN — ROCURONIUM BROMIDE 50 MG: 10 SOLUTION INTRAVENOUS at 08:31

## 2024-04-11 RX ADMIN — Medication 100 MCG: at 08:39

## 2024-04-11 RX ADMIN — Medication 100 MCG: at 08:36

## 2024-04-11 RX ADMIN — ROCURONIUM BROMIDE 30 MG: 10 SOLUTION INTRAVENOUS at 09:05

## 2024-04-11 RX ADMIN — SODIUM CHLORIDE: 9 INJECTION, SOLUTION INTRAVENOUS at 08:25

## 2024-04-11 RX ADMIN — DEXAMETHASONE SODIUM PHOSPHATE 4 MG: 4 INJECTION, SOLUTION INTRAMUSCULAR; INTRAVENOUS at 08:34

## 2024-04-11 RX ADMIN — Medication 100 MCG: at 09:06

## 2024-04-11 RX ADMIN — PROPOFOL 150 MG: 10 INJECTION, EMULSION INTRAVENOUS at 08:31

## 2024-04-11 RX ADMIN — ROCURONIUM BROMIDE 20 MG: 10 SOLUTION INTRAVENOUS at 09:34

## 2024-04-11 RX ADMIN — LIDOCAINE HYDROCHLORIDE 100 MG: 10 INJECTION, SOLUTION EPIDURAL; INFILTRATION; INTRACAUDAL; PERINEURAL at 08:31

## 2024-04-11 RX ADMIN — FAMOTIDINE 20 MG: 20 TABLET, FILM COATED ORAL at 07:52

## 2024-04-11 RX ADMIN — ROCURONIUM BROMIDE 10 MG: 10 SOLUTION INTRAVENOUS at 10:30

## 2024-04-11 NOTE — ANESTHESIA PREPROCEDURE EVALUATION
Anesthesia Evaluation     Patient summary reviewed and Nursing notes reviewed   NPO Solid Status: > 2 hours  NPO Liquid Status: > 8 hours           Airway   Mallampati: I  TM distance: >3 FB  Neck ROM: full  No difficulty expected  Dental      Pulmonary    (+) ,sleep apnea  (-) asthma, shortness of breath, recent URI, not a smoker  Cardiovascular     ECG reviewed    (+) hypertension, valvular problems/murmurs MR, dysrhythmias Atrial Fib, Paroxysmal Atrial Fib, PVD (sp pangioplasty stent R leg), hyperlipidemia    ROS comment: ECG A fib LAD     ECHO 2020  EF >51% moderate concentric LVH LVDD . (grade I) consistent with impaired relaxation.  LA moderately dilated. 5.1 Cm  MOD MR   AV No stenosis Mild AI   RVSP 33 mild TI     MPS 2020 · Diaphragmatic attenuation artifact  EF = 45%.  SPECT -hypoperfusion of the inferior wall ( rest rather than stress. Reverse redistribution.) No definite evidence of ischemia seen.        ·       Neuro/Psych  (-) seizures, CVA  GI/Hepatic/Renal/Endo    (+) obesity  (-) no renal disease, diabetes, no thyroid disorder    Musculoskeletal     Abdominal    Substance History      OB/GYN          Other   arthritis,         Phys Exam Other: Many  crowns etc                   Anesthesia Plan    ASA 3     general     intravenous induction     Anesthetic plan, risks, benefits, and alternatives have been provided, discussed and informed consent has been obtained with: patient.    Plan discussed with CRNA.        CODE STATUS:

## 2024-04-11 NOTE — ANESTHESIA POSTPROCEDURE EVALUATION
Patient: Orlando Wylie    Procedure Summary       Date: 04/11/24 Room / Location: [facility] CATH/EP LAB F /  [facility] EP INVASIVE LOCATION    Anesthesia Start: 0821 Anesthesia Stop: 1128    Procedure: Ablation atrial fibrillation--rhythmia, general anesthesia, preop CTA, DO NOT STOP XARELTO, hold sotolol 3 days Diagnosis:       Atrial fibrillation, persistent      (atrial fibrillation)    Providers: Waldemar Pope DO Provider: Herman Cuevas MD    Anesthesia Type: general ASA Status: 3            Anesthesia Type: general    Vitals  Vitals Value Taken Time   /68 04/11/24 1127   Temp 97.1 °F (36.2 °C) 04/11/24 1127   Pulse 65 04/11/24 1127   Resp 10 04/11/24 1127   SpO2 92 % 04/11/24 1127           Post Anesthesia Care and Evaluation    Patient location during evaluation: PACU  Patient participation: complete - patient participated  Level of consciousness: awake and alert  Pain score: 0  Pain management: adequate    Airway patency: patent  Anesthetic complications: No anesthetic complications  PONV Status: none  Cardiovascular status: hemodynamically stable and acceptable  Respiratory status: nonlabored ventilation, acceptable and nasal cannula  Hydration status: acceptable    Comments: Pt awake, A&O x3, dentition and lips intact, pt arms at sides, no touching face noted; pt awake and talking, denies pain, report to RN at bedside in EP lab F room-care accepted

## 2024-04-11 NOTE — OP NOTE
"          Cardiac Electrophysiology Procedure Note           [facility]         CATHETER ABLATION FOR ATRIAL FIBRILLATION (PVI)    PROCEDURES PERFORMED:    Catheter ablation of persistent atrial fibrillation  Adjunctive ablation of the left atrial posterior wall for persistent atrial fibrillation  Adjunctive ablation of the base of the left atrial appendage for persistent atrial fibrillation  Full diagnostic EP study  3D electroanatomic mapping  drug infusion / programmed pacing  Intracadiac Echocardiography  transeptal puncture  Left ventricular pacing and recording    PREPROCEDURAL DIAGNOSES:    1. Persistent Atrial fibrillation    POST PROCEDURE DIANGOSES:  As above.    INDICATION FOR PROCEDURE:  Briefly, Orlando Wylie is a 73 y.o. year old male with a history of persistent atrial fibrillation.    ANTICOAGULATION STRATEGY PRIOR TO AND POST PROCEDURE:  DOAC.  The last dose of anticoagulant was confirmed to have been taken this last PM.      PT/INR:  No results found for: \"LABPROT\", \"INR\"  PTT:  No results found for: \"APTT\"    CBC:   Hemoglobin   Date Value Ref Range Status   04/11/2024 15.3 12.0 - 17.0 g/dL Final     Comment:     Serial Number: [id number]:  [id number]     Hematocrit   Date Value Ref Range Status   04/11/2024 45 38 - 51 % Final     BMP:     Creatinine   Date Value Ref Range Status   04/11/2024 1.20 0.60 - 1.30 mg/dL Final     eGFR   Date Value Ref Range Status   04/11/2024 63.5 >60.0 mL/min/1.73 Final       Vital Signs: /72   Pulse 67   Temp 97.1 °F (36.2 °C) (Temporal)   Resp 10   Ht 180.3 cm (71\")   Wt 110 kg (242 lb 9.6 oz)   SpO2 91%   BMI 33.84 kg/m²      Admit Weight:  110 kg (242 lb 9.6 oz)  BMI: Body mass index is 33.84 kg/m².    PROCEDURE NARRATIVE:    The patient was able to give written informed consent after revisiting the key portions of the risk versus benefit profile of the procedure.  This discussion was framed by our lengthy " conversations  (please see our detailed notes).  Patient verbalized strong understanding of this discussion and a strong desire to proceed with the procedure.  Please note that this detailed informed consent process utilized mutual and shared decision making process between all parties involved, principally the physician and patient, but also potentially with input from the patient's selected family and friends.    The patient was brought to the EP laboratory in the post absorptive state.  The patient was electively intubated for the procedure and given a general anesthetic by colleagues from anesthesia.  Please see the detailed anesthesia records.    The patient was then prepared and draped in a routing sterile fashion.  Seldinger access was obtained at the bilateral common femoral veins with 3 venipunctures.  J tip wires were advanced into the vascular space.  Short 11, 8 and a long  sheath were placed into the bilateral common femoral veins and the inferior vena cava / right atrium in an over the wire fashion.    The patient was anticoagulated with intravenous heparin (initial bolus and then continuous infusion) with a goal ACT of between 350 and 400 seconds.  A phased array ICE catheter was placed into the right atrium and right ventricle.  This was used for transeptal puncture, monitoring of the pericardial space, monitoring of the ablation catheter position within the heart and monitoring of other cardiac structures such as the left atrial appendage (to document the absence of thrombus), pulmonary veins, esophagus, mitral valve annulus and other cardiac structures.    Morgan-septal puncture was performed after heparin administration with a combination of echocardiographic and fluoroscopic guidance.  This was performed with the long sheath, a BRK needle, and a SafeSept transeptal wire.  Catheters and sheaths were advanced safely into the left atrium.  A multielectrode electrophysiology catheter was used for  pacing and recording in the left atrium, left atrial appendage, pulmonary veins and left ventricle at various times throughout the procedure.  We used the [employer] [place] 3D electroanatomic mapping system in conjunction with these catheters to construct an electroanatomic shell of the left atrium, left atrial appendage, mitral valve annulus, interatrial septum and pulmonary veins.  Left ventricular pacing and recording were performed by placing catheters safely and carefully across the mitral valve annulus to the left ventricle from the left atrium.  Adequate sensing and pacing thresholds were obtained from the left ventricle.  AV conduction ( antegrade ) as well as VA conduction ( retrograde ) were studied.  This was performed as a distinct addition to the diagnostic EP study.  Findings were conclusive for no accessory pathway and VA dissociation.    An EP study was performed.  Data obtained from this is listed in the below table:    Initial Study    Isuprel Washout Study           drive train / burst extra    QRS 88    Rhythm          Atrial CL      R-R 959    Ventricular CL      AH 94    AVBCL      HV 54    AVNERP       drive train / burst extrastim   Slow Pway ERP      Rhythm     Fast Pway ERP      Atrial CL     VABCL conc? /  Dec?      Ventricular CL     VAERP      AVBCL 550    AERP      AVNERP 600 380   VERP      Slow Pway ERP     AP antegrade ERP      Fast Pway ERP     AP retrograde ERP      VABCL conc? /  Dec?           VAERP    Final Study      AERP      drive train / burst extrastim    VERP     Rhythm      AP antegrade ERP     Atrial CL      AP retrograde ERP     Ventricular CL           AVBCL     Isuprel Dose =      AVNERP       drive train / burst extrastim   Slow Pway ERP      Rhythm     Fast Pway ERP      Atrial CL     VABCL conc? /  Dec?      Ventricular CL     VAERP      AVBCL     AERP      AVNERP     VERP      Slow Pway ERP     AP antegrade ERP      Fast Pway ERP     AP retrograde ERP       VABCL conc? /  Dec?           VAERP           AERP           VERP           AP antegrade ERP           AP retrograde ERP                       Catheter ablation was performed to achieve pulmonary vein isolation.  A wide antral circumferential ablation approach was used.  Additional lesions were given within the antral lesion set at the hua between superior and inferior veins to achieve total isolation, when and where necessary.      The patient remained in atrial fibrillation.  Adjunctive ablation was performed to achieve isolation of the entire left atrial posterior wall.     The patient remained in atrial fibrillation.  Adjunctive ablation was performed to achieve isolation of the base of the left atrial appendage.  The MARILIN was not isolated however.     After completing the antral ablation lesion set, I interrogated the pulmonary veins using and documented pulmonary vein isolation.    Adenosine 12 mg was administered intravenously following ablation to test for other atrial and or ventricular arrhythmias.   Programmed stimulation was performed in an attempt to induce other and additional arrhythmias.  No arrhythmias were induced during administration and washout.    The ICE catheter revealed that there was no pericardial effusion.    Catheters and sheaths were then removed from the body.    Hemostasis was achieved with Vascade closure devices.    The patient was extubated in routine fashion and transferred to recovery in stable condition.    COMPLICATIONS: none    EBL: minimal    KEY PROCEDURAL FINDINGS:   Successful wide antral circumferential pulmonary vein isolation, posterior wall isolation and ablation of the base of the left atrial appendage.   No left atrial myopathy   Normal LA pressure   Normal AV node and His [place] function.    POST PROCEDURAL PLAN:    Report was called the the recovery nurse responsible for the patients care.  Uninterrupted anticoagulation for not less than 90 days unless  specially instructed otherwise by myself or another member of our EP physician team.  Please note that the patient and the patient’s family have been extensively counseled about this critical requirement and have agreed to comply.  Anticipate discharge this day.  Medications were reconciled, and key changes in medications include: stop sotalol  Patient and family instructed to call immediately for fever greater than 101.5 degrees F, hematemesis, increasing or severe chest pain, increasing shortness of breath, bleeding or other concerns.  Patient and family instructed that some chest discomfort is normal and is to be expected and that this should be expected to decrease over the first 3-4 days after the ablation procedure.  The patient will be seen at our office per routine follow up.      Waldemar Pope DO, FACC, [facility]  Cardiac Electrophysiologist  [facility]

## 2024-04-11 NOTE — ANESTHESIA PROCEDURE NOTES
Airway  Urgency: elective    Date/Time: 4/11/2024 8:33 AM  Airway not difficult    General Information and Staff    Patient location: EP lab F.  CRNA/CAA: Nela Hardin CRNA    Indications and Patient Condition  Indications for airway management: airway protection    Preoxygenated: yes  MILS not maintained throughout  Mask difficulty assessment: 1 - vent by mask    Final Airway Details  Final airway type: endotracheal airway      Successful airway: ETT  Cuffed: yes   Successful intubation technique: direct laryngoscopy  Endotracheal tube insertion site: oral  Blade: Glo  Blade size: 4  ETT size (mm): 7.5  Cormack-Lehane Classification: grade I - full view of glottis  Placement verified by: chest auscultation and capnometry   Measured from: lips  ETT/EBT  to lips (cm): 22  Number of attempts at approach: 1  Assessment: lips, teeth, and gum same as pre-op and atraumatic intubation    Additional Comments  Negative epigastric sounds, Breath sound equal bilaterally with symmetric chest rise and fall

## 2024-04-11 NOTE — H&P
[facility] Electrophysiology    Date of Hospital Visit: 24    Place of Service: [facility]    Patient Name: Orlando Wylie  :1950      Primary Care Provider: Abbe Rubio MD    Chief complaint: Atrial fibrillation      Problem List:  Active Hospital Problems    Diagnosis  POA    **Atrial fibrillation, persistent [I48.19]  Unknown       History of Present Illness:  This is a 73 y.o. patient recently evaluated in the electrophysiology clinic for persistent atrial fibrillation.  Symptoms include awareness of palpitations, dizziness, shortness of breath.          Allergies   Allergen Reactions    Codeine Nausea And Vomiting    Lisinopril Cough       Current Outpatient Medications   Medication Instructions    Acetaminophen (Tylenol) 325 MG capsule Oral, As Needed    amLODIPine (NORVASC) 5 MG tablet Take 1 tablet daily and extra 1/2 to 1 tablet if needed for increased /90    aspirin 81 mg, Oral, Daily    Calcium Citrate-Vitamin D3 (CITRACAL) 315-6.25 MG-MCG tablet tablet 1 tablet, Oral, Daily    difluprednate (Durezol) 0.05 % ophthalmic emulsion 1 drop, Left Eye, 2 times daily    losartan-hydrochlorothiazide (HYZAAR) 100-25 MG per tablet 1 tablet, Oral, Daily    magnesium oxide (MAG-OX) 400 mg, Oral, Daily    multivitamin with minerals (MULTIVITAL PO) 1 tablet, Oral, Daily    rivaroxaban (XARELTO) 20 mg, Oral, Daily    rosuvastatin (CRESTOR) 5 mg, Oral, Daily    sotalol (BETAPACE) 120 mg, Oral, 2 Times Daily           Social History     Socioeconomic History    Marital status:    Tobacco Use    Smoking status: Former     Current packs/day: 0.00     Types: Cigarettes     Quit date:      Years since quittin.2     Passive exposure: Never    Smokeless tobacco: Never   Vaping Use    Vaping status: Never Used   Substance and Sexual Activity    Alcohol use: Yes     Comment: occasional beer    Drug use: No    Sexual activity: Defer       Family History   Problem  "Relation Age of Onset    Atrial fibrillation Mother     No Known Problems Father     Hypertension Brother     No Known Problems Daughter     No Known Problems Son        REVIEW OF SYSTEMS:   Constitutional: Positive for malaise/fatigue. Negative for diaphoresis and weight gain.   Cardiovascular:  Positive for palpitations. Negative for chest pain, claudication, dyspnea on exertion, irregular heartbeat, leg swelling, orthopnea, paroxysmal nocturnal dyspnea and syncope.   Respiratory:  Positive for shortness of breath. Negative for cough, sleep disturbances due to breathing and wheezing.    Hematologic/Lymphatic: Negative for bleeding problem.   Musculoskeletal:  Negative for muscle cramps, muscle weakness and myalgias.   Gastrointestinal:  Negative for heartburn.   Neurological:  Negative for weakness.            Objective:  140/102, HR 98, Sat 96%      EXAM:  Constitutional:       Appearance: Well-developed.   Pulmonary:      Effort: Pulmonary effort is normal. No respiratory distress.      Breath sounds: Normal breath sounds. No wheezing. No rales.      Comments: Bases clear  Chest:      Chest wall: Not tender to palpatation.   Cardiovascular:      Normal rate.  Irregular rhythm.      Murmurs: There is no murmur.      No gallop.  No click. No rub.   Pulses:     Intact distal pulses.   Edema:     Peripheral edema absent.   Musculoskeletal: Normal range of motion.     Lab Review:       Results from last 7 days   Lab Units 04/04/24  1210   SODIUM mmol/L 135*   POTASSIUM mmol/L 3.7   CHLORIDE mmol/L 97*   CO2 mmol/L 32.0*   BUN mg/dL 16   CREATININE mg/dL 1.05   GLUCOSE mg/dL 74   CALCIUM mg/dL 9.7     Results from last 7 days   Lab Units 04/04/24  1210   WBC 10*3/mm3 5.27   HEMOGLOBIN g/dL 15.4   HEMATOCRIT % 46.6   PLATELETS 10*3/mm3 278     CrCl cannot be calculated (Unknown ideal weight.).  No results found for: \"INR\", \"PROTIME\"     Assessment:   Persistent atrial fibrillation        Plan:   LOIS GREENBERG" DUNG Anglin

## 2024-04-12 ENCOUNTER — CALL CENTER PROGRAMS (OUTPATIENT)
Dept: CALL CENTER | Facility: HOSPITAL | Age: 74
End: 2024-04-12
Payer: COMMERCIAL

## 2024-04-12 NOTE — OUTREACH NOTE
PCI/Device Survey      Flowsheet Row Responses   Facility patient discharged from? [facility]   Procedure date 04/11/24   Procedure (if device, specify in description) Ablation   Performing MD Dr. Waldemar Pope   Attempt successful? Yes   Call start time 0949   Call end time 0955   Nursing interventions Patient education provided   Is the patient taking prescribed medications: ASA   Nursing intervention Reminded to continue to take prescribed medications, Nurse provided patient education   Medication comments pt is taking Xarelto 20mg daily pt reports   Does the patient have any of the following symptoms related to the cath/surgical site? --  [bilat groin sites with dressings in place, no issues per pt report.]   Nursing intervention Patient education provided   Does the patient have an appointment scheduled with the cardiologist? Yes   If the patient is a current smoker, are they able to teach back resources for cessation? Not a smoker   Did the patient feel prepared to go home on the same day as the procedure? Yes   Is the patient satisfied with the same day discharge process? Yes   PCI/Device call completed Yes            Caty EDOUARD - Registered Nurse

## 2024-04-15 ENCOUNTER — TELEPHONE (OUTPATIENT)
Dept: CARDIOLOGY | Facility: CLINIC | Age: 74
End: 2024-04-15
Payer: COMMERCIAL

## 2024-04-15 DIAGNOSIS — G47.34 NOCTURNAL OXYGEN DESATURATION: Primary | ICD-10-CM

## 2024-04-15 NOTE — TELEPHONE ENCOUNTER
Caller: Orlando Wylie     Relationship:SELF    Best call back number: [phone]    What is your medical concern? OXYGEN LEVEL WHILE LAYING DOWN FLAT    How long has this issue been going on? ON AND OFF SEVERAL YEARS    Is your provider already aware of this issue? NO     Have you been treated for this issue? NO    PATIENT STATES HE HAD AN ABLATION LAST WEEK, HIS OXYGEN WAS LOW AT NIGHT AND HAD OXYGEN IN THE RECOVERY ROOM. PATIENT STATES HIS OXYGEN GOES DOWN AT NIGHT WHILE LAYING FLAT, AND WOULD LIKE TO KNOW ABOUT POSSIBLY GETTING OXYGEN TO WEAR AT NIGHT TO HELP.

## 2024-04-23 ENCOUNTER — TELEPHONE (OUTPATIENT)
Dept: CARDIOLOGY | Facility: CLINIC | Age: 74
End: 2024-04-23
Payer: COMMERCIAL

## 2024-04-23 RX ORDER — AMLODIPINE BESYLATE 10 MG/1
10 TABLET ORAL DAILY
Start: 2024-04-23

## 2024-04-23 NOTE — TELEPHONE ENCOUNTER
Caller: Orlando Wylie    Relationship: Self    Best call back number: [phone]    What is the best time to reach you: ANY    What was the call regarding: DR. CARRASQUILLO PERFORMED AN ABLATION ON 4.11.2024 SINCE,  THE PATIENT'S BLOOD PRESSURE IS RUNNING HIGH IN THE MORNING HOURS. THIS MORNING READING /91 AFTER MEDICATION THE 2ND READING /96 AND HE STATES THE LAST FEW HAVE BEEN HIGH, THIS STARTED THE LAST FEW DAYS. . PLEASE CALL THE PATIENT TO ADVISE OR DISCUSS ASAP    Is it okay if the provider responds through MyChart: NO

## 2024-06-05 ENCOUNTER — OFFICE VISIT (OUTPATIENT)
Dept: PULMONOLOGY | Facility: CLINIC | Age: 74
End: 2024-06-05
Payer: COMMERCIAL

## 2024-06-05 VITALS
DIASTOLIC BLOOD PRESSURE: 77 MMHG | HEIGHT: 71 IN | WEIGHT: 242 LBS | SYSTOLIC BLOOD PRESSURE: 145 MMHG | BODY MASS INDEX: 33.88 KG/M2 | HEART RATE: 75 BPM | OXYGEN SATURATION: 96 %

## 2024-06-05 DIAGNOSIS — G47.33 OSA (OBSTRUCTIVE SLEEP APNEA): Primary | ICD-10-CM

## 2024-06-05 PROCEDURE — 99213 OFFICE O/P EST LOW 20 MIN: CPT | Performed by: NURSE PRACTITIONER

## 2024-06-05 RX ORDER — SODIUM FLUORIDE 6 MG/ML
PASTE, DENTIFRICE DENTAL
COMMUNITY
Start: 2024-06-03

## 2024-06-05 RX ORDER — PSEUDOEPHEDRINE HCL 30 MG
TABLET ORAL
COMMUNITY

## 2024-06-05 NOTE — PROGRESS NOTES
New Pulmonary Patient Office Visit      Patient Name: Orlando Wylie    Referring Physician: Lolita Joshua APRN    Chief Complaint:    Chief Complaint   Patient presents with    Sleeping Problem     New patient- Nocturnal oxygen desaturation       History of Present Illness: Orlando Wylie is a 73 y.o. male who is referred here today by cardiology to establish care with Pulmonary for O2 desaturations during sleep.  Most recent overnight pulse oximetry study was in 2022.  The patient does note that during hospitalizations, desaturations are always noted during recovery and during sleep.  Most recently, he had an atrial fibrillation ablation and his oxygen was low at night and in the recovery room during his hospital stay.  He reports having an in lab sleep study 15-20 years ago, which he reports was inconclusive.  He denies any known lung disease.  No exertional dyspnea and was able to bale hay yesterday without difficulty.  He does have BPH and awakens to void every couple of hours at night.  He has noted when he awakens to go to the bathroom, he had been feeling short of breath, though this has resolved since his ablation procedure. +snoring, fragmented sleep, nonrestorative sleep.    Supplemental Oxygen: No    Subjective      Review of Systems:   Review of Systems   Constitutional:  Positive for fatigue. Negative for fever and unexpected weight change.   Respiratory:  Negative for cough, shortness of breath and wheezing.    Cardiovascular:  Negative for chest pain.   Psychiatric/Behavioral:  Positive for sleep disturbance.         Past Medical History:   Past Medical History:   Diagnosis Date    Arthritis     BPH (benign prostatic hyperplasia)     Cataract     Diverticulosis     Exogenous obesity     H/O prostate biopsy 06/29/2010    negative findings    History of colon polyps     History of knee replacement     History of left knee replacement 06/25/2019    History of transfusion     [place] (hard of  hearing)     hearing aids    Hyperlipidemia     Hypertension     PAF (paroxysmal atrial fibrillation)     PVD (peripheral vascular disease)     Sleep apnea     no cpap    Varicose veins        Past Surgical History:   Past Surgical History:   Procedure Laterality Date    AORTAGRAM N/A 05/03/2021    Procedure: COMMON FEMORAL LEFT, RIGHT COMMON FEMORAL, AND RIGHT POSTERIOR TIBIAL ACCESS WITH AORTAGRAM, RIGHT LEG ANGIOGRAM, RIGHT POPLITEAL ARTERY ANGIOPLASTY AND STENT, IVUS OF RIGHT SUPERFICIAL FEMORAL AND POPLITEAL ARTERIES;  Surgeon: Paulo Mcduffie MD;  [facility];  Service: Vascular;  Laterality: N/A;  FLOURO- 11 min, 30 sec  DOSE- 35 mL visi  MGY- 34      CARDIAC ELECTROPHYSIOLOGY PROCEDURE N/A 4/11/2024    Procedure: Ablation atrial fibrillation--rhythmia, general anesthesia, preop CTA, DO NOT STOP XARELTO, hold sotolol 3 days;  Surgeon: Waldemar Pope DO;  [facility];  Service: Cardiovascular;  Laterality: N/A;    CARDIOVASCULAR STRESS TEST  09/01/2015    [email or web address] Myoview- Neagtive.    CARDIOVASCULAR STRESS TEST  08/31/2020    3 Min. 15 Secs. 4.6 METS. A.Fib RVR. 113% THR. BP- 153/95. EF 45%. Negative.    CARDIOVERSION  12/04/2020    Cardioverted to Sinus    CARDIOVERSION  01/12/2024    Converted with 200 J    DISTAL POPLITEAL ARTERY BYPASS  10/13/2020    below the knee    ECHO - CONVERTED  09/01/2015    @[facility]. Dr. Parker- EF 65%. Mild MR and AI    ECHO - CONVERTED  08/31/2020    EF 55%. LA- 5.1 Cm.Mod MR. Mild AI. RVSP- 33 mmHG. (A.Fib)    KNEE ARTHROPLASTY Bilateral     OTHER SURGICAL HISTORY  12/10/2020    Pulse O2- 84 Min of Hyoxia.    TONSILLECTOMY      TOTAL KNEE ARTHROPLASTY REVISION Bilateral     TRANSESOPHAGEAL ECHOCARDIOGRAM (SANDRITA) AND CARDIOVERSION  11/05/2012    Dr. VIGNESH ANDERSON / TRANSURETHRAL INCISION / DRAINAGE PROSTATE  2023    with laser       Family History:   Family History   Problem Relation Age of Onset    Atrial fibrillation Mother     No Known  Problems Father     Hypertension Brother     No Known Problems Daughter     No Known Problems Son        Social History:   Social History     Socioeconomic History    Marital status:    Tobacco Use    Smoking status: Former     Current packs/day: 0.00     Types: Cigarettes     Quit date:      Years since quittin.     Passive exposure: Never    Smokeless tobacco: Never   Vaping Use    Vaping status: Never Used   Substance and Sexual Activity    Alcohol use: Yes     Comment: occasional beer    Drug use: No    Sexual activity: Defer       Medications:     Current Outpatient Medications:     Acetaminophen (Tylenol) 325 MG capsule, Take  by mouth As Needed., Disp: , Rfl:     amLODIPine (NORVASC) 10 MG tablet, Take 1 tablet by mouth Daily., Disp: , Rfl:     aspirin 81 MG chewable tablet, Chew 1 tablet Daily., Disp: , Rfl:     Calcium Citrate-Vitamin D3 (CITRACAL) 315-6.25 MG-MCG tablet tablet, Take 1 tablet by mouth Daily., Disp: , Rfl:     difluprednate (Durezol) 0.05 % ophthalmic emulsion, Administer 1 drop into the left eye 2 (two) times a day., Disp: , Rfl:     losartan-hydrochlorothiazide (HYZAAR) 100-25 MG per tablet, Take 1 tablet by mouth Daily., Disp: 90 tablet, Rfl: 2    magnesium oxide (MAG-OX) 400 MG tablet, Take 1 tablet by mouth Daily., Disp: 30 tablet, Rfl: 11    multivitamin with minerals (MULTIVITAL PO), Take 1 tablet by mouth Daily., Disp: , Rfl:     PreviDent 5000 Booster Plus 1.1 % paste, , Disp: , Rfl:     rivaroxaban (Xarelto) 20 MG tablet, Take 1 tablet by mouth Daily., Disp: 90 tablet, Rfl: 2    rosuvastatin (CRESTOR) 5 MG tablet, Take 1 tablet by mouth Daily., Disp: 90 tablet, Rfl: 2    docusate sodium 100 MG capsule, Take two every day Indications: constipation, Disp: , Rfl:     Allergies:   Allergies   Allergen Reactions    Codeine Nausea And Vomiting    Lisinopril Cough    Amiodarone Myalgia       Objective     Physical Exam:  Vital Signs:   Vitals:    24 1002   BP:  "145/77   Pulse: 75   SpO2: 96%   Weight: 110 kg (242 lb)   Height: 180.3 cm (71\")     Body mass index is 33.75 kg/m².    Physical Exam  Vitals reviewed.   Constitutional:       General: He is not in acute distress.     Appearance: He is not toxic-appearing.   HENT:      Head: Normocephalic and atraumatic.      Mouth/Throat:      Mouth: Mucous membranes are moist.   Eyes:      Conjunctiva/sclera: Conjunctivae normal.   Cardiovascular:      Rate and Rhythm: Normal rate and regular rhythm.      Heart sounds: Murmur heard.   Pulmonary:      Effort: Pulmonary effort is normal.      Breath sounds: Normal breath sounds.   Abdominal:      General: There is no distension.      Palpations: Abdomen is soft.   Musculoskeletal:         General: No swelling.      Cervical back: Neck supple.   Skin:     General: Skin is warm and dry.      Findings: No rash.   Neurological:      General: No focal deficit present.      Mental Status: He is alert and oriented to person, place, and time.   Psychiatric:         Mood and Affect: Mood normal.         Behavior: Behavior normal.       Results Review:   April 2024 CTA chest showed cardiac enlargement without pericardial effusion.  Ascending aortic aneurysm measuring 4.3 cm.  No anomalous pulmonary venous return.  Left atrial appendage well-opacified without significant thrombus burden.  Esophagus transverses the chest posterior to the left atrium just left of midline.    2022 overnight pulse oximetry study showed O2 saturation less than or equal to 89% for 56 seconds, no desaturation less than 88%.    Assessment / Plan      Assessment/Plan:    Diagnoses and all orders for this visit:    1. LISA (obstructive sleep apnea) (Primary)  -     Home Sleep Study; Future     Patient with multiple symptoms suggestive of sleep apnea. Will check sleep study to evaluate. The patient was counseled on the risks of untreated sleep apnea and voiced understanding.       Follow Up:   Return in about 3 months " (around 9/5/2024) for Recheck.  The patient was counseled on diagnostic results, risks and benefits of treatment options, risk factor modifications and the importance of treatment compliance. The patient was advised to contact the clinic with concerns or worsening symptoms.     FARRAH Murcia  Pulmonary Medicine [place]    This document has been electronically signed by FARRAH Murcia  June 5, 2024 10:40 EDT

## 2024-06-07 ENCOUNTER — PATIENT ROUNDING (BHMG ONLY) (OUTPATIENT)
Dept: PULMONOLOGY | Facility: CLINIC | Age: 74
End: 2024-06-07
Payer: COMMERCIAL

## 2024-06-07 ENCOUNTER — OFFICE VISIT (OUTPATIENT)
Dept: CARDIOLOGY | Facility: CLINIC | Age: 74
End: 2024-06-07
Payer: COMMERCIAL

## 2024-06-07 VITALS
SYSTOLIC BLOOD PRESSURE: 118 MMHG | OXYGEN SATURATION: 96 % | BODY MASS INDEX: 33.32 KG/M2 | DIASTOLIC BLOOD PRESSURE: 72 MMHG | HEIGHT: 71 IN | WEIGHT: 238 LBS | HEART RATE: 88 BPM

## 2024-06-07 DIAGNOSIS — I48.19 ATRIAL FIBRILLATION, PERSISTENT: Primary | ICD-10-CM

## 2024-06-07 DIAGNOSIS — Z79.01 LONG TERM CURRENT USE OF ANTICOAGULANT: ICD-10-CM

## 2024-06-07 DIAGNOSIS — Z79.899 LONG TERM CURRENT USE OF ANTIARRHYTHMIC MEDICAL THERAPY: ICD-10-CM

## 2024-06-07 PROCEDURE — 99214 OFFICE O/P EST MOD 30 MIN: CPT | Performed by: INTERNAL MEDICINE

## 2024-06-07 RX ORDER — AMLODIPINE BESYLATE 5 MG/1
5 TABLET ORAL 2 TIMES DAILY
Qty: 180 TABLET | Refills: 1
Start: 2024-06-07

## 2024-06-07 NOTE — PROGRESS NOTES
Cardiac Electrophysiology Outpatient Follow Up Note            [facility]    Follow Up Office Visit      Orlando Wylie  [id number]  06/07/2024  [unfilled]  [unfilled]    Primary Care Physician: Abbe Rubio MD    Referred By: No ref. provider found    Subjective     Chief Complaint:   Diagnoses and all orders for this visit:    1. Atrial fibrillation, persistent (Primary)    2. Long term current use of antiarrhythmic medical therapy    3. Long term current use of anticoagulant      Chief Complaint   Patient presents with    Atrial fibrillation, persistent       History of Present Illness:   Orlando Wylie is a 73 y.o. male who presents to my electrophysiology clinic for follow up of above complaints.  Feels a lot better energy levels drastically improved able to sleep better as well now since his A-fib ablation..      Past Medical History:   Past Medical History:   Diagnosis Date    Arthritis     BPH (benign prostatic hyperplasia)     Cataract     Diverticulosis     Exogenous obesity     H/O prostate biopsy 06/29/2010    negative findings    History of colon polyps     History of knee replacement     History of left knee replacement 06/25/2019    History of transfusion     [place] (hard of hearing)     hearing aids    Hyperlipidemia     Hypertension     PAF (paroxysmal atrial fibrillation)     PVD (peripheral vascular disease)     Sleep apnea     no cpap    Varicose veins        Past Surgical History:   Past Surgical History:   Procedure Laterality Date    AORTAGRAM N/A 05/03/2021    Procedure: COMMON FEMORAL LEFT, RIGHT COMMON FEMORAL, AND RIGHT POSTERIOR TIBIAL ACCESS WITH AORTAGRAM, RIGHT LEG ANGIOGRAM, RIGHT POPLITEAL ARTERY ANGIOPLASTY AND STENT, IVUS OF RIGHT SUPERFICIAL FEMORAL AND POPLITEAL ARTERIES;  Surgeon: Paulo Mcduffie MD;  Location: [facility];  Service: Vascular;  Laterality: N/A;  FLOURO- 11 min, 30 sec  DOSE- 35 mL visi  MGY-  34      CARDIAC ELECTROPHYSIOLOGY PROCEDURE N/A 4/11/2024    Procedure: Ablation atrial fibrillation--rhythmia, general anesthesia, preop CTA, DO NOT STOP XARELTO, hold sotolol 3 days;  Surgeon: Waldemar Pope DO;  Location: [facility] INVASIVE LOCATION;  Service: Cardiovascular;  Laterality: N/A;    CARDIOVASCULAR STRESS TEST  09/01/2015    [email or web address] Myoview- Neagtive.    CARDIOVASCULAR STRESS TEST  08/31/2020    3 Min. 15 Secs. 4.6 METS. A.Fib RVR. 113% THR. BP- 153/95. EF 45%. Negative.    CARDIOVERSION  12/04/2020    Cardioverted to Sinus    CARDIOVERSION  01/12/2024    Converted with 200 J    DISTAL POPLITEAL ARTERY BYPASS  10/13/2020    below the knee    ECHO - CONVERTED  09/01/2015    @[facility]. Dr. Parker- EF 65%. Mild MR and AI    ECHO - CONVERTED  08/31/2020    EF 55%. LA- 5.1 Cm.Mod MR. Mild AI. RVSP- 33 mmHG. (A.Fib)    KNEE ARTHROPLASTY Bilateral     OTHER SURGICAL HISTORY  12/10/2020    Pulse O2- 84 Min of Hyoxia.    TONSILLECTOMY      TOTAL KNEE ARTHROPLASTY REVISION Bilateral     TRANSESOPHAGEAL ECHOCARDIOGRAM (SANDRITA) AND CARDIOVERSION  11/05/2012    Dr. VIGNESH ANDERSON / TRANSURETHRAL INCISION / DRAINAGE PROSTATE  2023    with laser       Family History:   Family History   Problem Relation Age of Onset    Atrial fibrillation Mother     No Known Problems Father     Hypertension Brother     No Known Problems Daughter     No Known Problems Son        Social History:   Social History     Socioeconomic History    Marital status:    Tobacco Use    Smoking status: Never     Passive exposure: Never    Smokeless tobacco: Never   Vaping Use    Vaping status: Never Used   Substance and Sexual Activity    Alcohol use: Yes     Alcohol/week: 4.0 standard drinks of alcohol     Types: 4 Cans of beer per week     Comment: occasional beer    Drug use: No    Sexual activity: Defer       Medications:     Current Outpatient Medications:     Acetaminophen (Tylenol) 325 MG capsule, Take  by mouth As Needed., Disp: , Rfl:      "amLODIPine (NORVASC) 10 MG tablet, Take 1 tablet by mouth Daily. (Patient taking differently: Take 0.5 tablets by mouth 2 (Two) Times a Day.), Disp: , Rfl:     aspirin 81 MG chewable tablet, Chew 1 tablet Daily., Disp: , Rfl:     Calcium Citrate-Vitamin D3 (CITRACAL) 315-6.25 MG-MCG tablet tablet, Take 1 tablet by mouth Daily., Disp: , Rfl:     difluprednate (Durezol) 0.05 % ophthalmic emulsion, Administer 1 drop into the left eye 2 (two) times a day., Disp: , Rfl:     docusate sodium 100 MG capsule, Take two every day Indications: constipation, Disp: , Rfl:     losartan-hydrochlorothiazide (HYZAAR) 100-25 MG per tablet, Take 1 tablet by mouth Daily., Disp: 90 tablet, Rfl: 2    magnesium oxide (MAG-OX) 400 MG tablet, Take 1 tablet by mouth Daily., Disp: 30 tablet, Rfl: 11    multivitamin with minerals (MULTIVITAL PO), Take 1 tablet by mouth Daily., Disp: , Rfl:     rivaroxaban (Xarelto) 20 MG tablet, Take 1 tablet by mouth Daily., Disp: 90 tablet, Rfl: 2    rosuvastatin (CRESTOR) 5 MG tablet, Take 1 tablet by mouth Daily., Disp: 90 tablet, Rfl: 2    PreviDent 5000 Booster Plus 1.1 % paste, , Disp: , Rfl:     Allergies:   Allergies   Allergen Reactions    Codeine Nausea And Vomiting    Lisinopril Cough    Amiodarone Myalgia       Objective   Vital Signs:   Vitals:    06/07/24 1412   BP: 118/72   BP Location: Right arm   Patient Position: Sitting   Pulse: 88   SpO2: 96%   Weight: 108 kg (238 lb)   Height: 180.3 cm (71\")       PHYSICAL EXAM  General appearance: Awake, alert, cooperative  Head: Normocephalic, without obvious abnormality, atraumatic  Eyes: Conjunctivae/corneas clear, EOMs intact  Neck: no adenopathy, no carotid bruit, no JVD, and thyroid: not enlarged  Lungs: clear to auscultation bilaterally and no rhonchi or crackles\", ' symmetric  Heart: regular rate and rhythm, S1, S2 normal, no murmur, click, rub or gallop  Abdomen: Soft, non-tender, bowel sounds normal,  no organomegaly  Extremities: extremities " "normal, atraumatic, no cyanosis or edema  Skin: Skin color, turgor normal, no rashes or lesions  Neurologic: Grossly normal     Lab Results   Component Value Date    GLUCOSE 74 04/04/2024    CALCIUM 9.7 04/04/2024     (L) 04/04/2024    K 3.7 04/04/2024    CO2 32.0 (H) 04/04/2024    CL 97 (L) 04/04/2024    BUN 16 04/04/2024    CREATININE 1.20 04/11/2024    EGFRIFNONA 85 04/30/2021    BCR 15.2 04/04/2024    ANIONGAP 6.0 04/04/2024     Lab Results   Component Value Date    WBC 5.27 04/04/2024    HGB 15.3 04/11/2024    HCT 45 04/11/2024    MCV 89.4 04/04/2024     04/04/2024     No results found for: \"INR\", \"PROTIME\"  Lab Results   Component Value Date    TSH 2.450 04/04/2024       Cardiac Testing:     I personally viewed and interpreted the patient's EKG/Telemetry/lab data    Procedures    Tobacco Cessation: N/A  Obstructive Sleep Apnea Screening: Completed    Advance Care Planning   ACP discussion was declined by the patient. Patient does not have an advance directive, declines further assistance.       Assessment & Plan    Diagnoses and all orders for this visit:    1. Atrial fibrillation, persistent (Primary)    2. Long term current use of antiarrhythmic medical therapy    3. Long term current use of anticoagulant         Diagnosis Plan   1. Atrial fibrillation, persistent  Highly symptomatic persistent A-fib status post catheter ablation in April of this year.  Sinus rhythm since.  No recurrence of A-fib.  Feels great.    Much better energy levels.    Clearly a win for him.    Lifelong anticoagulation recommended.      2. Long term current use of antiarrhythmic medical therapy  Of antiarrhythmic drugs at this point.      3. Long term current use of anticoagulant  Long-term anticoagulation indicated for the primary prevention of stroke.  Doing well.        Body mass index is 33.19 kg/m².    I spent 39 minutes in consultation with this patient which included more than 65% of this time in direct " face-to-face counseling, physical examination and discussion of my assessment and findings and this shared decision making with the patient.  The remainder of the time not spent face-to-face was performing one, some or all of the following actions: preparing to see the patient (e.g. reviewing tests, prior clinicians' notes, etc), ordering medications, tests or procedures, coordination of care, discussion of the plan with other healthcare providers, documenting clinical information in epic as well as independently interpreting results and communication of these results to the patient family and/or caregiver(s).  Please note that this explicitly excludes time spent on other separate billable services such as performing procedures or test interpretation, when applicable.      Follow Up:       Thank you for allowing me to participate in the care of your patient. Please to not hesitate to contact me with additional questions or concerns.      Waldemar Pope DO, FACC, RS  Cardiac Electrophysiologist  [facility] / [facility]

## 2024-06-19 ENCOUNTER — TELEPHONE (OUTPATIENT)
Dept: CARDIOLOGY | Facility: CLINIC | Age: 74
End: 2024-06-19
Payer: COMMERCIAL

## 2024-06-19 NOTE — TELEPHONE ENCOUNTER
[facility]  PATIENT CANCELED 6 MOS F/U IN JUNE DUE TO HAVING AN ABLATION WITH DR CARRASQUILLO AND JUST WANTED THE NEXT AVAILABLE APT HERE HE DID WANT OUT A FEW MONTHS, NEXT AVAILABLE WAS OCTOBER.

## 2024-06-19 NOTE — TELEPHONE ENCOUNTER
It looks like patient has already canceled appointment that was next week. Can you call to get patient rescheduled in 6 months? That is probably our first available.

## 2024-07-01 ENCOUNTER — TELEPHONE (OUTPATIENT)
Dept: PULMONOLOGY | Facility: CLINIC | Age: 74
End: 2024-07-01

## 2024-07-01 NOTE — TELEPHONE ENCOUNTER
Hub staff attempted to follow warm transfer process and was unsuccessful     Caller: Orlando Wylie    Relationship to patient: Self    Best call back number: [phone]     Patient is needing: PT HAS NOT HEARD FROM ANYONE ABOUT SCHEDULING SLEEP STUDY. TRIED TO TRANSFER TO LAB BUT NO ANSWER. PLEASE CALL PT TO ADVISE

## 2024-07-10 ENCOUNTER — TELEPHONE (OUTPATIENT)
Dept: PULMONOLOGY | Facility: CLINIC | Age: 74
End: 2024-07-10

## 2024-07-10 NOTE — TELEPHONE ENCOUNTER
Provider: FARRAH DEL RIO    Caller: [facility]    Relationship to Patient: OTHER    Phone Number: [phone]    Reason for Call: FACILITY CALLED STATING THAT THEY HAVE NOT RECEIVED THE PT'S HOME SLEEP STUDY ORDER// THEY CAN HAVE IT FAXED [phone]

## 2024-09-09 RX ORDER — LOSARTAN POTASSIUM AND HYDROCHLOROTHIAZIDE 25; 100 MG/1; MG/1
1 TABLET ORAL DAILY
Qty: 60 TABLET | Refills: 0 | Status: SHIPPED | OUTPATIENT
Start: 2024-09-09

## 2024-09-25 PROBLEM — R00.1 BRADYCARDIA, SINUS: Status: RESOLVED | Noted: 2020-12-08 | Resolved: 2024-09-25

## 2024-09-25 PROBLEM — Z79.899 LONG TERM CURRENT USE OF ANTIARRHYTHMIC MEDICAL THERAPY: Status: RESOLVED | Noted: 2017-04-27 | Resolved: 2024-09-25

## 2024-09-27 ENCOUNTER — OFFICE VISIT (OUTPATIENT)
Dept: CARDIOLOGY | Facility: CLINIC | Age: 74
End: 2024-09-27
Payer: COMMERCIAL

## 2024-09-27 VITALS
OXYGEN SATURATION: 95 % | HEART RATE: 70 BPM | SYSTOLIC BLOOD PRESSURE: 152 MMHG | WEIGHT: 240 LBS | DIASTOLIC BLOOD PRESSURE: 92 MMHG | HEIGHT: 71 IN | BODY MASS INDEX: 33.6 KG/M2

## 2024-09-27 DIAGNOSIS — I49.3 PVC'S (PREMATURE VENTRICULAR CONTRACTIONS): Primary | ICD-10-CM

## 2024-09-27 DIAGNOSIS — I10 ESSENTIAL HYPERTENSION: ICD-10-CM

## 2024-09-27 DIAGNOSIS — Z79.01 CURRENT USE OF LONG TERM ANTICOAGULATION: ICD-10-CM

## 2024-09-27 DIAGNOSIS — I48.19 ATRIAL FIBRILLATION, PERSISTENT: ICD-10-CM

## 2024-09-30 ENCOUNTER — HOSPITAL ENCOUNTER (OUTPATIENT)
Dept: CARDIOLOGY | Facility: HOSPITAL | Age: 74
Discharge: HOME | End: 2024-09-30
Admitting: PHYSICIAN ASSISTANT
Payer: COMMERCIAL

## 2024-09-30 VITALS — HEIGHT: 71 IN | WEIGHT: 240.3 LBS | BODY MASS INDEX: 33.64 KG/M2

## 2024-09-30 LAB
AORTIC DIMENSIONLESS INDEX: 0.75 (DI)
BH CV ECHO MEAS - ACS: 1.92 CM
BH CV ECHO MEAS - AO MAX PG: 8.3 MMHG
BH CV ECHO MEAS - AO MEAN PG: 4.6 MMHG
BH CV ECHO MEAS - AO ROOT DIAM: 3.6 CM
BH CV ECHO MEAS - AO V2 MAX: 143.7 CM/SEC
BH CV ECHO MEAS - AO V2 VTI: 31.3 CM
BH CV ECHO MEAS - EDV(CUBED): 166.4 ML
BH CV ECHO MEAS - EF_3D-VOL: 63 %
BH CV ECHO MEAS - ESV(CUBED): 42.3 ML
BH CV ECHO MEAS - FS: 36.6 %
BH CV ECHO MEAS - IVS/LVPW: 0.9 CM
BH CV ECHO MEAS - IVSD: 1.03 CM
BH CV ECHO MEAS - LA DIMENSION: 4.8 CM
BH CV ECHO MEAS - LAT PEAK E' VEL: 10.8 CM/SEC
BH CV ECHO MEAS - LV MASS(C)D: 238.8 GRAMS
BH CV ECHO MEAS - LV MAX PG: 5.4 MMHG
BH CV ECHO MEAS - LV MEAN PG: 2.14 MMHG
BH CV ECHO MEAS - LV V1 MAX: 115.9 CM/SEC
BH CV ECHO MEAS - LV V1 VTI: 23.2 CM
BH CV ECHO MEAS - LVIDD: 5.5 CM
BH CV ECHO MEAS - LVIDS: 3.5 CM
BH CV ECHO MEAS - LVPWD: 1.14 CM
BH CV ECHO MEAS - MED PEAK E' VEL: 9.1 CM/SEC
BH CV ECHO MEAS - MR MAX PG: 89.6 MMHG
BH CV ECHO MEAS - MR MAX VEL: 473.2 CM/SEC
BH CV ECHO MEAS - MV A MAX VEL: 43.9 CM/SEC
BH CV ECHO MEAS - MV DEC SLOPE: 533.5 CM/SEC2
BH CV ECHO MEAS - MV DEC TIME: 0.24 SEC
BH CV ECHO MEAS - MV E MAX VEL: 65.6 CM/SEC
BH CV ECHO MEAS - MV E/A: 1.49
BH CV ECHO MEAS - MV MAX PG: 5.6 MMHG
BH CV ECHO MEAS - MV MEAN PG: 1.49 MMHG
BH CV ECHO MEAS - MV P1/2T: 65.2 MSEC
BH CV ECHO MEAS - MV V2 VTI: 35.4 CM
BH CV ECHO MEAS - MVA(P1/2T): 3.4 CM2
BH CV ECHO MEAS - PA V2 MAX: 78.2 CM/SEC
BH CV ECHO MEAS - RAP SYSTOLE: 8 MMHG
BH CV ECHO MEAS - RV MAX PG: 1.93 MMHG
BH CV ECHO MEAS - RV V1 MAX: 69.5 CM/SEC
BH CV ECHO MEAS - RV V1 VTI: 13.4 CM
BH CV ECHO MEAS - RVDD: 3.1 CM
BH CV ECHO MEAS - RVSP: 17.9 MMHG
BH CV ECHO MEAS - TAPSE (>1.6): 2.09 CM
BH CV ECHO MEAS - TR MAX PG: 9.9 MMHG
BH CV ECHO MEAS - TR MAX VEL: 157.5 CM/SEC
BH CV ECHO MEASUREMENTS AVERAGE E/E' RATIO: 6.59
BH CV XLRA - TDI S': 10.4 CM/SEC
SINUS: 3.6 CM

## 2024-09-30 PROCEDURE — 93306 TTE W/DOPPLER COMPLETE: CPT

## 2024-09-30 PROCEDURE — 93306 TTE W/DOPPLER COMPLETE: CPT | Performed by: INTERNAL MEDICINE

## 2024-10-01 NOTE — PATIENT INSTRUCTIONS
Physical Activity With Heart Disease  Being active has many benefits, especially if you have heart disease. Physical activity can help you do more and feel healthier. Start slowly, and increase the amount of time you spend being active. Most adults should aim for physical activity that:  · Makes you breathe harder and raises your heart rate (aerobic activity). Try to get at least 150 minutes of aerobic activity each week. This is about 30 minutes each day, 5 days a week.  · Helps build muscle strength (strengthening activity). Do this at least 2 times a week.  Always talk with your health care provider before starting any new activity program or if you have any changes in your condition.  What are the benefits of physical activity?  When you have heart disease, physical activity can help:  · Lower your blood pressure.  · Lower your cholesterol.  · Control your weight.  · Improve your sleep.  · Help control your blood sugar.  · Improve your heart and lung function.  · Reduce your risk for blood clots (thrombophlebitis).  · Improve your energy level.  · Reduce stress.  What are some types of physical activity I could try?  There are many ways to be active. Talk with your health care provider about what types and intensity of activity is right for you.  Aerobic activity    Aerobic (cardiovascular) activity can be moderate or vigorous intensity, depending on how hard you are working.  Moderate-intensity activity includes:  · Walking.  · Slow bicycling.  · Water aerobics.  · Dancing.  · Light gardening or house work.  Vigorous-intensity activity includes:  · Jogging or running.  · Stair climbing.  · Swimming laps.  · Hiking uphill.  · Heavy gardening, such as digging trenches.    Strengthening activity  Strengthening activities work your muscles to build strength. Some examples include:  · Doing push-ups, sit-ups, or pull-ups.  · Lifting small weights.  · Using resistance bands.    Flexibility  Flexibility activities  lengthen your muscles to keep them flexible and less tight and improve your balance. Some examples include:  · Stretching.  · Yoga.  · Capo chi.  · Ballet barre.    Follow these instructions at home:  How to get started  · Talk with your health care provider about:  ? What types of activities are safe for you.  ? If you should check your pulse or take other precautions during physical activity.  · Get a calendar. Write down a schedule and plan for your new routine.  · Take time to find out what works for you. Consider:  ? Joining a community program, such as a biking group, yoga class, local gym, or swimming pool membership.  ? Be active on your own by downloading free workout applications on a smartphone or other devices, or by purchasing workout DVDs.  · If you have not been active, begin with sessions that last 10-15 minutes. Gradually work up to sessions that last 20-30 minutes, 5 times a week. Follow all of your health care provider's recommendations.  · Be patient with yourself. It takes time to build up strength and lung capacity.  Safety  · Exercise in an indoor, climate-controlled facility, as told by your health care provider. You may need to do this if:  ? There are extreme outdoor conditions, such as heat, humidity, or cold.  ? There is an air pollution advisory. Your local news, board of health, or hospital can provide information on air quality.  · Take extra precautions as told by your health care provider. This may include:  ? Monitoring your heart rate.  ? Avoiding heavy lifting.  ? Understanding how your medicines can affect you during physical activity. Certain medicines may cause heat intolerance or changes in blood sugar.  ? Slowing down to rest when you need to.  ? Keeping nitroglycerin spray and tablets with you at all times if you have angina. Use them as told to prevent and treat symptoms.  · Drink plenty of water before, during, and after physical activity.  · Know what symptoms may be signs  of a problem. Stop physical activity right away if you have any of these symptoms.  Get help right away if you have any of the following during exercise:  · Chest pain, shortness of breath, or feel very tired.  · Pain in the arm, shoulder, neck, or jaw.  · Feel weak, dizzy, or light-headed.  · An irregular heart rate, or your heart rate is greater than 100 beats per minute (bpm) before exercise.  These symptoms may represent a serious problem that is an emergency. Do not wait to see if the symptoms go away. Get medical help right away. Call your local emergency services (911 in the [place] Do not drive yourself to the hospital.   Summary  · Physical activity has many benefits, especially if you have heart disease.  · Before starting an activity program, talk with your health care provider about how often to be active and what type of activity is safe for you.  · Your physical activity plan may include moderate or vigorous aerobic activity, strengthening activities, and flexibility.  · Know what symptoms may be signs of a problem. Stop physical activity right away and call emergency services (911 in the [place] if you have any of these symptoms.  This information is not intended to replace advice given to you by your health care provider. Make sure you discuss any questions you have with your health care provider.  Document Revised: 01/09/2019 Document Reviewed: 01/09/2019  Elsevier Patient Education © 2021 [employer]     No

## 2024-10-07 RX ORDER — AMLODIPINE BESYLATE 5 MG/1
TABLET ORAL
Qty: 180 TABLET | Refills: 1 | Status: SHIPPED | OUTPATIENT
Start: 2024-10-07 | End: 2024-10-14 | Stop reason: SDUPTHER

## 2024-10-07 NOTE — TELEPHONE ENCOUNTER
Rx Refill Note  Requested Prescriptions     Pending Prescriptions Disp Refills    amLODIPine (NORVASC) 5 MG tablet [Pharmacy Med Name: AMLODIPINE BESYLATE 5 MG TAB] 180 tablet 1     Sig: TAKE 1 TABLET BY MOUTH DAILY AND EXTRA  1/2 TABLET TO ONE TABLET IF NEEDED FOR INCREASED BLOOD PRESSURE 150/90      Last office visit with prescribing clinician: 12/19/2023   Last telemedicine visit with prescribing clinician: Visit date not found   Next office visit with prescribing clinician: 10/14/2024                         Would you like a call back once the refill request has been completed: [] Yes [] No    If the office needs to give you a call back, can they leave a voicemail: [] Yes [] No    Sarah Sanchez CMA  10/07/24, 16:03 EDT

## 2024-10-11 ENCOUNTER — TELEPHONE (OUTPATIENT)
Dept: CARDIOLOGY | Facility: CLINIC | Age: 74
End: 2024-10-11

## 2024-10-14 ENCOUNTER — OFFICE VISIT (OUTPATIENT)
Dept: CARDIOLOGY | Facility: CLINIC | Age: 74
End: 2024-10-14
Payer: COMMERCIAL

## 2024-10-14 VITALS
HEIGHT: 71 IN | WEIGHT: 237 LBS | DIASTOLIC BLOOD PRESSURE: 70 MMHG | SYSTOLIC BLOOD PRESSURE: 138 MMHG | HEART RATE: 68 BPM | BODY MASS INDEX: 33.18 KG/M2

## 2024-10-14 DIAGNOSIS — E78.00 HYPERCHOLESTEREMIA: ICD-10-CM

## 2024-10-14 DIAGNOSIS — E66.811 OBESITY (BMI 30.0-34.9): ICD-10-CM

## 2024-10-14 DIAGNOSIS — I48.0 PAROXYSMAL ATRIAL FIBRILLATION: ICD-10-CM

## 2024-10-14 DIAGNOSIS — I10 ESSENTIAL HYPERTENSION: ICD-10-CM

## 2024-10-14 DIAGNOSIS — R00.2 PALPITATIONS: ICD-10-CM

## 2024-10-14 DIAGNOSIS — I49.3 PVC (PREMATURE VENTRICULAR CONTRACTION): Primary | ICD-10-CM

## 2024-10-14 DIAGNOSIS — I73.9 PVD (PERIPHERAL VASCULAR DISEASE) WITH CLAUDICATION: ICD-10-CM

## 2024-10-14 PROCEDURE — 93000 ELECTROCARDIOGRAM COMPLETE: CPT | Performed by: NURSE PRACTITIONER

## 2024-10-14 PROCEDURE — 99214 OFFICE O/P EST MOD 30 MIN: CPT | Performed by: NURSE PRACTITIONER

## 2024-10-14 RX ORDER — ROSUVASTATIN CALCIUM 5 MG/1
5 TABLET, COATED ORAL DAILY
Qty: 90 TABLET | Refills: 2 | Status: SHIPPED | OUTPATIENT
Start: 2024-10-14

## 2024-10-14 RX ORDER — LOSARTAN POTASSIUM AND HYDROCHLOROTHIAZIDE 25; 100 MG/1; MG/1
1 TABLET ORAL DAILY
Qty: 180 TABLET | Refills: 2 | Status: SHIPPED | OUTPATIENT
Start: 2024-10-14

## 2024-10-14 RX ORDER — AMLODIPINE BESYLATE 5 MG/1
5 TABLET ORAL 2 TIMES DAILY
Qty: 180 TABLET | Refills: 2 | Status: SHIPPED | OUTPATIENT
Start: 2024-10-14

## 2024-10-14 NOTE — PROGRESS NOTES
Chief Complaint   Patient presents with    Follow-up     For cardiac management pt denies having any chest pains, SOA , palpitations or dizziness. Had ablation in May and is feeling better since. Had EKG 3 weeks ago that showed a skipped beat. Pt takes an aspirin. Pt brought med list to appointment    Med Refill     90 day refills on cardiac medications to [facility]       Cardiac Complaints  palpitations      Subjective   Orlando Wylie is a 73 y.o. male with HTN, hyperlipidemia, and PAF diagnosed in 2012 when he underwent cardioversion. In September 2016, he saw Dr. Law to establish cardiac care. He had seen another cardiologist prior, had workup and was told everything was normal. His main concern was being on amiodarone for over 3 years. Since his LV function was normal and no ischemia was found, it was decided to stop amiodarone and flecainide was started. EKG has remained sinus.  Later he had more atrial fibrillation and Xarelto therapy started.  Repeat stress and echo shows mildly diminished LV function but no ischemia.  On 12/4/2020 he underwent successful cardioversion and later experienced bradycardia for which Lanoxin was stopped and Lopressor decreased.  Flecainide had been changed to Multaq therapy.  Follow-up EKG was stable showing sinus bradycardia with heart rate of 53 bpm.  In January 2021 cardiac clearance was given for total knee revision.  He had postoperative complications and underwent physical therapy at [facility]. Later antiarrhythmic medication management was changed to sotalol. No mention of aortic aneurysm on US of aorta 2022. He underwent ablation in April 2024 with Dr. Pope, long term anticoagulation continued, antiarrythmic's stopped.    He comes today for follow up after having a holter per PT. Holter showed PVCs, patient followed by EP for arrythmia management, they ordered the holter, and advised likely will add beta blockade. No CP, SOA, palpitations, or dizziness. He  admits labs were done in June 2024 with PCP which showed: PSA 1.20, Mag 2.1,BUN 17, Creatinine 1.9, Na 138, K 4.1, GFR 76, HH 14.5/45.8 . He admits to upcoming pulmonary appointment for sleep apnea, to go over his overnight results.            Cardiac History  Past Surgical History:   Procedure Laterality Date    AORTAGRAM N/A 05/03/2021    Procedure: COMMON FEMORAL LEFT, RIGHT COMMON FEMORAL, AND RIGHT POSTERIOR TIBIAL ACCESS WITH AORTAGRAM, RIGHT LEG ANGIOGRAM, RIGHT POPLITEAL ARTERY ANGIOPLASTY AND STENT, IVUS OF RIGHT SUPERFICIAL FEMORAL AND POPLITEAL ARTERIES;  Surgeon: Paulo Mcduffie MD;  Location: [facility];  Service: Vascular;  Laterality: N/A;  FLOURO- 11 min, 30 sec  DOSE- 35 mL visi  MGY- 34      CARDIAC ELECTROPHYSIOLOGY PROCEDURE N/A 04/11/2024    Procedure: Ablation atrial fibrillation--rhythmia, general anesthesia, preop CTA, DO NOT STOP XARELTO, hold sotolol 3 days;  Surgeon: Waldemar Pope DO;  Location: [facility] EP INVASIVE LOCATION;  Service: Cardiovascular;  Laterality: N/A;    CARDIOVASCULAR STRESS TEST  09/01/2015    @[email or web address] Myoview- Neagtive.    CARDIOVASCULAR STRESS TEST  08/31/2020    3 Min. 15 Secs. 4.6 METS. A.Fib RVR. 113% THR. BP- 153/95. EF 45%. Negative.    CARDIOVERSION  12/04/2020    Cardioverted to Sinus    CARDIOVERSION  01/12/2024    Converted with 200 J    DISTAL POPLITEAL ARTERY BYPASS  10/13/2020    below the knee    ECHO - CONVERTED  09/01/2015    @[facility]. Dr. Parker- EF 65%. Mild MR and AI    ECHO - CONVERTED  08/31/2020    EF 55%. LA- 5.1 Cm.Mod MR. Mild AI. RVSP- 33 mmHG. (A.Fib)    ECHO - CONVERTED  09/30/2024    EF 65%. LA- 4.8 Cm. PEYTON. Mild mR & AI. RVSP- 18 mmHg    KNEE ARTHROPLASTY Bilateral     OTHER SURGICAL HISTORY  12/10/2020    Pulse O2- 84 Min of Hyoxia.    TONSILLECTOMY      TOTAL KNEE ARTHROPLASTY REVISION Bilateral     TRANSESOPHAGEAL ECHOCARDIOGRAM (SANDRITA) AND CARDIOVERSION  11/05/2012    Dr. VIGNESH ANDERSON / TRANSURETHRAL INCISION / DRAINAGE PROSTATE   2023    with laser       Current Outpatient Medications   Medication Sig Dispense Refill    Acetaminophen (Tylenol) 325 MG capsule Take  by mouth As Needed.      amLODIPine (NORVASC) 5 MG tablet Take 1 tablet by mouth 2 (Two) Times a Day. 180 tablet 2    Artificial Tear Ointment (DRY EYES OP) Administer 2 drops to both eyes Every Morning.      aspirin 81 MG EC tablet Take 1 tablet by mouth Daily.      Calcium Citrate-Vitamin D3 (CITRACAL) 315-6.25 MG-MCG tablet tablet Take 1 tablet by mouth Daily.      docusate sodium 100 MG capsule Take 1 capsule by mouth Daily As Needed.      losartan-hydrochlorothiazide (HYZAAR) 100-25 MG per tablet Take 1 tablet by mouth Daily. 180 tablet 2    magnesium oxide (MAG-OX) 400 MG tablet Take 1 tablet by mouth Daily. 30 tablet 11    multivitamin with minerals (MULTIVITAL PO) Take 1 tablet by mouth Daily.      PreviDent 5000 Booster Plus 1.1 % paste Daily As Needed.      rivaroxaban (Xarelto) 20 MG tablet Take 1 tablet by mouth Daily. 90 tablet 2    rosuvastatin (CRESTOR) 5 MG tablet Take 1 tablet by mouth Daily. 90 tablet 2     No current facility-administered medications for this visit.       Codeine, Lisinopril, and Amiodarone    Past Medical History:   Diagnosis Date    Arthritis     BPH (benign prostatic hyperplasia)     Cataract     Diverticulosis     Exogenous obesity     H/O prostate biopsy 06/29/2010    negative findings    History of colon polyps     History of knee replacement     History of left knee replacement 06/25/2019    History of transfusion     [place] (hard of hearing)     hearing aids    Hyperlipidemia     Hypertension     PAF (paroxysmal atrial fibrillation)     PVD (peripheral vascular disease)     Sleep apnea     no cpap    Varicose veins        Social History     Socioeconomic History    Marital status:    Tobacco Use    Smoking status: Never     Passive exposure: Never    Smokeless tobacco: Never   Vaping Use    Vaping status: Never Used   Substance and  "Sexual Activity    Alcohol use: Yes     Alcohol/week: 4.0 standard drinks of alcohol     Types: 4 Cans of beer per week     Comment: occasional beer    Drug use: No    Sexual activity: Defer       Family History   Problem Relation Age of Onset    Atrial fibrillation Mother     No Known Problems Father     Hypertension Brother     No Known Problems Daughter     No Known Problems Son        Review of Systems   Constitutional: Negative for malaise/fatigue and night sweats.   Cardiovascular:  Positive for palpitations. Negative for chest pain, claudication, dyspnea on exertion, irregular heartbeat, leg swelling, near-syncope, orthopnea and syncope.   Respiratory:  Negative for cough, shortness of breath and wheezing.    Musculoskeletal:  Negative for back pain, joint pain and stiffness.   Gastrointestinal:  Negative for anorexia, heartburn, nausea and vomiting.   Genitourinary:  Negative for dysuria, hematuria, hesitancy and nocturia.   Neurological:  Negative for dizziness, light-headedness and loss of balance.   Psychiatric/Behavioral:  Negative for depression and memory loss. The patient is not nervous/anxious.            Objective     /70 (BP Location: Left arm, Patient Position: Sitting, Cuff Size: Adult)   Pulse 68   Ht 180 cm (70.87\")   Wt 108 kg (237 lb)   BMI 33.18 kg/m²     Constitutional:       Appearance: Not in distress.   Eyes:      Pupils: Pupils are equal, round, and reactive to light.   HENT:      Nose: Nose normal.   Pulmonary:      Effort: Pulmonary effort is normal.      Breath sounds: Normal breath sounds.   Cardiovascular:      PMI at left midclavicular line. Normal rate. Regular rhythm.      Murmurs: There is a systolic murmur.   Abdominal:      Palpations: Abdomen is soft.   Musculoskeletal: Normal range of motion.      Cervical back: Normal range of motion and neck supple. Skin:     General: Skin is warm and dry.   Neurological:      Mental Status: Alert.           ECG 12 " Lead    Date/Time: 10/14/2024 9:22 AM  Performed by: Lolita Joshua APRN    Authorized by: Lolita Joshua APRN  Comparison: compared with previous ECG from 10/2/2024  Comparison to previous ECG: Bigeminal pattern prior  Rhythm: sinus rhythm  BPM: 63    Clinical impression: normal ECG  Comments: Normal QT               Diagnoses and all orders for this visit:    1. PVC (premature ventricular contraction) (Primary)  -     ECG 12 Lead    2. Paroxysmal atrial fibrillation  -     ECG 12 Lead  -     rivaroxaban (Xarelto) 20 MG tablet; Take 1 tablet by mouth Daily.  Dispense: 90 tablet; Refill: 2    3. Essential hypertension    4. Hypercholesteremia    5. PVD (peripheral vascular disease) with claudication    6. Palpitations    7. Obesity (BMI 30.0-34.9)    Other orders  -     amLODIPine (NORVASC) 5 MG tablet; Take 1 tablet by mouth 2 (Two) Times a Day.  Dispense: 180 tablet; Refill: 2  -     losartan-hydrochlorothiazide (HYZAAR) 100-25 MG per tablet; Take 1 tablet by mouth Daily.  Dispense: 180 tablet; Refill: 2  -     rosuvastatin (CRESTOR) 5 MG tablet; Take 1 tablet by mouth Daily.  Dispense: 90 tablet; Refill: 2             PAF: EKG done today for hx of PAF with ablation in April showed a NSR with normal QT. He is still anticoagulated with xarelto per EP recommendations. He is off antiarrythmic therapy, but per recent EP recommendation if his holter showed a great deal of PVCs, then BB would be considered. Patient will discuss report with EP. Limited caffeine urged.     HTN: BP is stable. Will continue with current hyzaar and norvasc BID. Limited sodium urged.     Hyperlipidemia: On statin therapy with crestor. Patient tolerates well. Limited carb diet urged. Labs from June showed lipids at goal.     Cardiac status: stable. Most recent workup 2020 neg for concerns, no repeat advised as he reports doing well, states he feels better than he has in a long while.     PVD: Claudication/ulceration denied. Continue ASA  and xarelto.    ? Sleep apnea: Upcoming pulmonary referral.     Refills per request.     BMI noted at 33.18, good cardiac diet encouraged.      6 month follow up recommended, or sooner if needed              Problems Addressed this Visit          Cardiac and Vasculature    Essential hypertension    Relevant Medications    amLODIPine (NORVASC) 5 MG tablet    losartan-hydrochlorothiazide (HYZAAR) 100-25 MG per tablet    Hypercholesteremia    Relevant Medications    rosuvastatin (CRESTOR) 5 MG tablet     Other Visit Diagnoses       PVC (premature ventricular contraction)    -  Primary    Relevant Medications    amLODIPine (NORVASC) 5 MG tablet    Other Relevant Orders    ECG 12 Lead    Paroxysmal atrial fibrillation        Relevant Medications    amLODIPine (NORVASC) 5 MG tablet    rivaroxaban (Xarelto) 20 MG tablet    Other Relevant Orders    ECG 12 Lead    PVD (peripheral vascular disease) with claudication        Palpitations        Obesity (BMI 30.0-34.9)              Diagnoses         Codes Comments    PVC (premature ventricular contraction)    -  Primary ICD-10-CM: I49.3  ICD-9-CM: 427.69     Paroxysmal atrial fibrillation     ICD-10-CM: I48.0  ICD-9-CM: 427.31     Essential hypertension     ICD-10-CM: I10  ICD-9-CM: 401.9     Hypercholesteremia     ICD-10-CM: E78.00  ICD-9-CM: 272.0     PVD (peripheral vascular disease) with claudication     ICD-10-CM: I73.9  ICD-9-CM: 443.9     Palpitations     ICD-10-CM: R00.2  ICD-9-CM: 785.1     Obesity (BMI 30.0-34.9)     ICD-10-CM: E66.811  ICD-9-CM: 278.00                         Electronically signed by FARRAH Leung October 14, 2024 12:15 EDT

## 2024-10-16 ENCOUNTER — TELEPHONE (OUTPATIENT)
Dept: OTHER | Facility: OTHER | Age: 74
End: 2024-10-16
Payer: COMMERCIAL

## 2024-10-16 DIAGNOSIS — G47.33 OSA (OBSTRUCTIVE SLEEP APNEA): ICD-10-CM

## 2024-10-16 NOTE — TELEPHONE ENCOUNTER
Caller: Orlando Wylie    Relationship: Self    Best call back number: [phone]    What is the best time to reach you: ANY    Who are you requesting to speak with (clinical staff, provider,  specific staff member): CLINICAL      PATIENT STATES THAT HE RECEIVED A CALL FROM JEFFERY ABOUT NEEDING TO SET HIM UP WITH A CPAP. HE HAS HEARD NOTHING ABOUT IT AND HE HE SAID THIS WAS IN AUGUST. HE SAID HE CANCELED HIS LAST APPT SO HE COULD GET SEEN SOONER AND THEN THAT APPT WAS CANCELED FROM THE OFFICE. NOW HE HAS AN APPT IN DECEMBER FOR A FOLLOW UP AND HE SAID THERE IS NOTHING TO FOLLOW UP ON BECAUSE HE DOES NOT HAVE HIS CPAP. HE WOULD LIKE A CALL BACK ASAP TO GET THIS STRAIGHTENED OUT.

## 2024-10-16 NOTE — TELEPHONE ENCOUNTER
Spoke with [facility] and they will be reaching out to patient to get him setup since they have everything they need.

## 2024-10-21 ENCOUNTER — TELEPHONE (OUTPATIENT)
Dept: CARDIOLOGY | Facility: CLINIC | Age: 74
End: 2024-10-21

## 2024-10-21 PROBLEM — I49.3 PVCS (PREMATURE VENTRICULAR CONTRACTIONS): Status: ACTIVE | Noted: 2024-10-21

## 2024-10-21 RX ORDER — METOPROLOL SUCCINATE 50 MG/1
50 TABLET, EXTENDED RELEASE ORAL DAILY
Qty: 30 TABLET | Refills: 3 | Status: SHIPPED | OUTPATIENT
Start: 2024-10-21

## 2024-10-21 NOTE — TELEPHONE ENCOUNTER
Called and spoke to the patient. At his last visit, starting him on a beta blocker was discussed to try to suppress his PVCs. You were waiting pending results of his holter/echo. He would like to proceed with taking a beta blocker. He states he has 100mg metoprolol tartrate pills at home from when he was previously taking 50mg BID; however, he would like to take whatever beta blocker you recommend. He does not notice any symptoms from his PVCs

## 2024-10-21 NOTE — TELEPHONE ENCOUNTER
Caller: Orlando Wylie    Relationship: Self    Best call back number: [phone]     Which medication are you concerned about: PT SPOKE WITH FARRAH SALINAS ABOUT POTENTIALLY TAKING METOPROLOL BASED ON DR. CARRASQUILLO'S RECOMMENDATION. HE HAS NOT BEEN ABLE TO GET A HOLD OF HIS OFFICE. PLEASE ADVISE.     Who prescribed you this medication: DR. CARRASQUILLO    When did you start taking this medication: NO    What are your concerns: BP    How long have you had these concerns: FOR A FEW WEEKS

## 2024-10-21 NOTE — TELEPHONE ENCOUNTER
Caller: Orlando Wylie     Relationship: Self     Best call back number: [phone]      Which medication are you concerned about: PT SPOKE WITH DR. CARRASQUILLO ABOUT METOPROLOL AND TESTING. PLEASE ADVISE TX PLAN CONCERNING IF WE ARE MOVING FORWARD WITH THIS MEDICATION OR NOT BASED ON TESTING AND PT'S CURRENT BP.      Who prescribed you this medication: DR. CARRASQUILLO     When did you start taking this medication: NO     What are your concerns: BP     How long have you had these concerns: FOR A FEW WEEKS

## 2024-10-22 NOTE — TELEPHONE ENCOUNTER
Pt notified. He will monitor his BP and HR after starting the medication and notify us if he notices a significant decrease in either.

## 2024-10-24 NOTE — TELEPHONE ENCOUNTER
I tried to call the patient back to f/u but he did not answer. I did not get an option to leave a message.

## 2024-12-27 ENCOUNTER — OFFICE VISIT (OUTPATIENT)
Dept: PULMONOLOGY | Facility: CLINIC | Age: 74
End: 2024-12-27
Payer: COMMERCIAL

## 2024-12-27 VITALS
HEART RATE: 56 BPM | SYSTOLIC BLOOD PRESSURE: 118 MMHG | WEIGHT: 247 LBS | BODY MASS INDEX: 34.58 KG/M2 | HEIGHT: 71 IN | OXYGEN SATURATION: 96 % | DIASTOLIC BLOOD PRESSURE: 76 MMHG

## 2024-12-27 DIAGNOSIS — G47.33 OSA (OBSTRUCTIVE SLEEP APNEA): Primary | ICD-10-CM

## 2024-12-27 PROCEDURE — 99213 OFFICE O/P EST LOW 20 MIN: CPT | Performed by: NURSE PRACTITIONER

## 2024-12-27 NOTE — PROGRESS NOTES
Follow Up Office Visit      Patient Name: Orlando Wylie    Chief Complaint:    Chief Complaint   Patient presents with    Sleeping Problem       History of Present Illness: Orlando Wylie is a 74 y.o. male who is here today for follow up of sleep apnea.  Since last visit, home sleep study returned positive for mild sleep apnea and he has been started on PAP via [employer].  He is using a nasal mask.  He reports that he is using his machine nightly for at least 4 hours and is feeling better rested, less tossing and turning with use.    Supplemental Oxygen: No    Subjective      Review of Systems:  Review of Systems   Constitutional:  Negative for fever and unexpected weight change.   Respiratory:  Negative for shortness of breath.    Cardiovascular:  Negative for chest pain.        Past Medical History:   Past Medical History:   Diagnosis Date    Arthritis     BPH (benign prostatic hyperplasia)     Cataract     Diverticulosis     Exogenous obesity     H/O prostate biopsy 06/29/2010    negative findings    History of colon polyps     History of knee replacement     History of left knee replacement 06/25/2019    History of transfusion     [place] (hard of hearing)     hearing aids    Hyperlipidemia     Hypertension     PAF (paroxysmal atrial fibrillation)     PVD (peripheral vascular disease)     Sleep apnea     no cpap    Varicose veins        Past Surgical History:   Past Surgical History:   Procedure Laterality Date    AORTAGRAM N/A 05/03/2021    Procedure: COMMON FEMORAL LEFT, RIGHT COMMON FEMORAL, AND RIGHT POSTERIOR TIBIAL ACCESS WITH AORTAGRAM, RIGHT LEG ANGIOGRAM, RIGHT POPLITEAL ARTERY ANGIOPLASTY AND STENT, IVUS OF RIGHT SUPERFICIAL FEMORAL AND POPLITEAL ARTERIES;  Surgeon: Paulo Mcduffie MD;  Location: [facility];  Service: Vascular;  Laterality: N/A;  FLOURO- 11 min, 30 sec  DOSE- 35 mL visi  MGY- 34      CARDIAC ELECTROPHYSIOLOGY PROCEDURE N/A 04/11/2024    Procedure:  Ablation atrial fibrillation--rhythmia, general anesthesia, preop CTA, DO NOT STOP XARELTO, hold sotolol 3 days;  Surgeon: Waldemar Pope DO;  Location: [facility] INVASIVE LOCATION;  Service: Cardiovascular;  Laterality: N/A;    CARDIOVASCULAR STRESS TEST  09/01/2015    @[email or web address] Myoview- Neagtive.    CARDIOVASCULAR STRESS TEST  08/31/2020    3 Min. 15 Secs. 4.6 METS. A.Fib RVR. 113% THR. BP- 153/95. EF 45%. Negative.    CARDIOVERSION  12/04/2020    Cardioverted to Sinus    CARDIOVERSION  01/12/2024    Converted with 200 J    DISTAL POPLITEAL ARTERY BYPASS  10/13/2020    below the knee    ECHO - CONVERTED  09/01/2015    @[facility]. Dr. Parker- EF 65%. Mild MR and AI    ECHO - CONVERTED  08/31/2020    EF 55%. LA- 5.1 Cm.Mod MR. Mild AI. RVSP- 33 mmHG. (A.Fib)    ECHO - CONVERTED  09/30/2024    EF 65%. LA- 4.8 Cm. PEYTON. Mild mR & AI. RVSP- 18 mmHg    KNEE ARTHROPLASTY Bilateral     OTHER SURGICAL HISTORY  12/10/2020    Pulse O2- 84 Min of Hyoxia.    TONSILLECTOMY      TOTAL KNEE ARTHROPLASTY REVISION Bilateral     TRANSESOPHAGEAL ECHOCARDIOGRAM (SANDRITA) AND CARDIOVERSION  11/05/2012    Dr. VIGNESH ANDERSON / TRANSURETHRAL INCISION / DRAINAGE PROSTATE  2023    with laser       Family History:   Family History   Problem Relation Age of Onset    Atrial fibrillation Mother     No Known Problems Father     Hypertension Brother     No Known Problems Daughter     No Known Problems Son        Social History:   Social History     Socioeconomic History    Marital status:    Tobacco Use    Smoking status: Never     Passive exposure: Never    Smokeless tobacco: Never   Vaping Use    Vaping status: Never Used   Substance and Sexual Activity    Alcohol use: Yes     Alcohol/week: 4.0 standard drinks of alcohol     Types: 4 Cans of beer per week     Comment: occasional beer    Drug use: No    Sexual activity: Defer       Current Medications:     Current Outpatient Medications:     Acetaminophen (Tylenol) 325 MG capsule, Take  by mouth As  "Needed., Disp: , Rfl:     amLODIPine (NORVASC) 5 MG tablet, Take 1 tablet by mouth 2 (Two) Times a Day., Disp: 180 tablet, Rfl: 2    Artificial Tear Ointment (DRY EYES OP), Administer 2 drops to both eyes Every Morning., Disp: , Rfl:     aspirin 81 MG EC tablet, Take 1 tablet by mouth Daily., Disp: , Rfl:     Calcium Citrate-Vitamin D3 (CITRACAL) 315-6.25 MG-MCG tablet tablet, Take 1 tablet by mouth Daily., Disp: , Rfl:     docusate sodium 100 MG capsule, Take 1 capsule by mouth Daily As Needed., Disp: , Rfl:     losartan-hydrochlorothiazide (HYZAAR) 100-25 MG per tablet, Take 1 tablet by mouth Daily., Disp: 180 tablet, Rfl: 2    magnesium oxide (MAG-OX) 400 MG tablet, Take 1 tablet by mouth Daily., Disp: 30 tablet, Rfl: 11    metoprolol succinate XL (TOPROL-XL) 50 MG 24 hr tablet, Take 1 tablet by mouth Daily., Disp: 30 tablet, Rfl: 3    multivitamin with minerals (MULTIVITAL PO), Take 1 tablet by mouth Daily., Disp: , Rfl:     PreviDent 5000 Booster Plus 1.1 % paste, Daily As Needed., Disp: , Rfl:     rivaroxaban (Xarelto) 20 MG tablet, Take 1 tablet by mouth Daily., Disp: 90 tablet, Rfl: 2    rosuvastatin (CRESTOR) 5 MG tablet, Take 1 tablet by mouth Daily., Disp: 90 tablet, Rfl: 2     Allergies:   Allergies   Allergen Reactions    Codeine Nausea And Vomiting    Lisinopril Cough    Amiodarone Myalgia       Objective     Physical Exam:  Vital Signs:   Vitals:    12/27/24 1414   BP: 118/76   Pulse: 56   SpO2: 96%   Weight: 112 kg (247 lb)   Height: 180.3 cm (71\")     Body mass index is 34.45 kg/m².    Physical Exam  Vitals reviewed.   Constitutional:       General: He is not in acute distress.     Appearance: He is not toxic-appearing.   HENT:      Head: Normocephalic and atraumatic.      Mouth/Throat:      Mouth: Mucous membranes are moist.   Eyes:      Extraocular Movements: Extraocular movements intact.      Conjunctiva/sclera: Conjunctivae normal.   Cardiovascular:      Rate and Rhythm: Regular rhythm.      " Heart sounds: Normal heart sounds.   Pulmonary:      Effort: Pulmonary effort is normal.      Breath sounds: Normal breath sounds.   Abdominal:      General: There is no distension.      Palpations: Abdomen is soft.   Musculoskeletal:         General: No swelling.      Cervical back: Neck supple.   Skin:     General: Skin is warm and dry.      Findings: No rash.   Neurological:      General: No focal deficit present.      Mental Status: He is alert and oriented to person, place, and time.   Psychiatric:         Mood and Affect: Mood normal.         Behavior: Behavior normal.       Results Review:   August 2024 Home sleep study showed mild sleep apnea with AHI of 12.1/h.    Assessment / Plan      Assessment/Plan:   Diagnoses and all orders for this visit:    1. LISA (obstructive sleep apnea) (Primary)    Recent home sleep study report reviewed and discussed with patient.  Good clinical response to treatment with PAP.  Continued nightly compliance is advised. The patient was counseled on the risks of untreated sleep apnea and voiced understanding.  Compliance report will be requested from the [facility] for review.    Follow Up:   Return in about 1 year (around 12/27/2025).  The patient was counseled on diagnostic results, risks and benefits of treatment options, risk factor modifications and the importance of treatment compliance. The patient was advised to contact the clinic with concerns or worsening symptoms.     FARRAH Murcia   Pulmonary [facility] [place]     This document has been electronically signed by FARRAH Murcia  December 27, 2024

## 2025-02-10 RX ORDER — ROSUVASTATIN CALCIUM 5 MG/1
5 TABLET, COATED ORAL DAILY
Qty: 90 TABLET | Refills: 3 | Status: SHIPPED | OUTPATIENT
Start: 2025-02-10

## 2025-03-11 ENCOUNTER — TELEPHONE (OUTPATIENT)
Dept: CARDIOLOGY | Facility: CLINIC | Age: 75
End: 2025-03-11
Payer: COMMERCIAL

## 2025-03-11 RX ORDER — METOPROLOL SUCCINATE 50 MG/1
50 TABLET, EXTENDED RELEASE ORAL DAILY
Qty: 30 TABLET | Refills: 3 | Status: SHIPPED | OUTPATIENT
Start: 2025-03-11

## 2025-03-11 NOTE — TELEPHONE ENCOUNTER
Patient left , requesting refill on his metoprolol (prescribed by DUNG Donnelly) to [facility], [place].  He reported he has about 5 pills left.

## 2025-03-12 ENCOUNTER — TELEPHONE (OUTPATIENT)
Dept: CARDIOLOGY | Facility: CLINIC | Age: 75
End: 2025-03-12
Payer: COMMERCIAL

## 2025-03-12 NOTE — TELEPHONE ENCOUNTER
Called and spoke with patient. Communicated to him that medication was sent to [facility] pharmacy yesterday. Patient verbalizes understanding.

## 2025-03-12 NOTE — TELEPHONE ENCOUNTER
Caller: Orlando Wylie    Relationship: Self    Best call back number: [phone]    What is the best time to reach you:  ANYTIME    Who are you requesting to speak with (clinical staff, provider,  specific staff member): ANYONE      What was the call regarding:  PT HAS 4 DAYS LEFT OF METOPROLOL 50 MG. HE SAYS THE BOTTLE HAS AUDREY WILLINGHAM AS THE PRESCRIBER, HE IS NO LONGER WITH . THE PT IS WANTING TO KNOW IF HE CAN GET REFILLS ASAP. HE WILL BE OUT OF TOWN THIS WEEKEND.     Is it okay if the provider responds through [employer]:  PLEASE CALL PT, THANK YOU

## 2025-04-14 RX ORDER — AMLODIPINE BESYLATE 5 MG/1
TABLET ORAL
Qty: 135 TABLET | Refills: 0 | Status: SHIPPED | OUTPATIENT
Start: 2025-04-14

## 2025-04-30 NOTE — PROGRESS NOTES
Cardiac Electrophysiology Outpatient Follow Up Note            [facility] at [facility]    Follow Up Office Visit      Orlando Wylie  [id number]  05/02/2025  [unfilled]  [unfilled]    Primary Care Physician: Abbe Rubio MD    Referred By: No ref. provider found    Subjective     Chief Complaint:   Diagnoses and all orders for this visit:    1. PVC's (premature ventricular contractions) (Primary)    2. AF (paroxysmal atrial fibrillation)  Assessment & Plan:  4/11/2024 he underwent successful wide antral circumferential pulmonary vein isolation, posterior wall isolation and ablation of the base of the left atrial appendage by Dr. Pope.  He is chronically anticoagulated with Xarelto 20 mg po daily.  He is on Metoprolol for arrhythmia suppression.          3. Essential hypertension  Assessment & Plan:    Today's BP           4. LISA (obstructive sleep apnea)  Assessment & Plan:  Non compliant with CPAP therapy           Chief Complaint   Patient presents with    Atrial fibrillation, persistent       History of Present Illness:   Orlando Wylie is a 74 y.o. male who presents to my electrophysiology clinic for follow up of above.      Past Medical History:   Past Medical History:   Diagnosis Date    Arthritis     BPH (benign prostatic hyperplasia)     Cataract     Diverticulosis     Exogenous obesity     H/O prostate biopsy 06/29/2010    negative findings    History of colon polyps     History of knee replacement     History of left knee replacement 06/25/2019    History of transfusion     [place] (hard of hearing)     hearing aids    Hyperlipidemia     Hypertension     PAF (paroxysmal atrial fibrillation)     PVD (peripheral vascular disease)     Sleep apnea     no cpap    Varicose veins        Past Surgical History:   Past Surgical History:   Procedure Laterality Date    AORTAGRAM N/A 05/03/2021    Procedure: COMMON FEMORAL LEFT, RIGHT COMMON FEMORAL, AND RIGHT POSTERIOR  TIBIAL ACCESS WITH AORTAGRAM, RIGHT LEG ANGIOGRAM, RIGHT POPLITEAL ARTERY ANGIOPLASTY AND STENT, IVUS OF RIGHT SUPERFICIAL FEMORAL AND POPLITEAL ARTERIES;  Surgeon: Paulo Mcduffie MD;  Location: [facility] HYBRID DEMETRIUS;  Service: Vascular;  Laterality: N/A;  FLOURO- 11 min, 30 sec  DOSE- 35 mL visi  MGY- 34      CARDIAC ELECTROPHYSIOLOGY PROCEDURE N/A 04/11/2024    Procedure: Ablation atrial fibrillation--rhythmia, general anesthesia, preop CTA, DO NOT STOP XARELTO, hold sotolol 3 days;  Surgeon: Waldemar Pope DO;  Location: [facility] EP INVASIVE LOCATION;  Service: Cardiovascular;  Laterality: N/A;    CARDIOVASCULAR STRESS TEST  09/01/2015    @[email or web address].L. Myoview- Neagtive.    CARDIOVASCULAR STRESS TEST  08/31/2020    3 Min. 15 Secs. 4.6 METS. A.Fib RVR. 113% THR. BP- 153/95. EF 45%. Negative.    CARDIOVERSION  12/04/2020    Cardioverted to Sinus    CARDIOVERSION  01/12/2024    Converted with 200 J    DISTAL POPLITEAL ARTERY BYPASS  10/13/2020    below the knee    ECHO - CONVERTED  09/01/2015    @[facility]. Dr. Parker- EF 65%. Mild MR and AI    ECHO - CONVERTED  08/31/2020    EF 55%. LA- 5.1 Cm.Mod MR. Mild AI. RVSP- 33 mmHG. (A.Fib)    ECHO - CONVERTED  09/30/2024    EF 65%. LA- 4.8 Cm. PEYTON. Mild mR & AI. RVSP- 18 mmHg    KNEE ARTHROPLASTY Bilateral     OTHER SURGICAL HISTORY  12/10/2020    Pulse O2- 84 Min of Hyoxia.    TONSILLECTOMY      TOTAL KNEE ARTHROPLASTY REVISION Bilateral     TRANSESOPHAGEAL ECHOCARDIOGRAM (SANDRITA) AND CARDIOVERSION  11/05/2012    Dr. VIGNESH ANDERSON / TRANSURETHRAL INCISION / DRAINAGE PROSTATE  2023    with laser       Family History:   Family History   Problem Relation Age of Onset    Atrial fibrillation Mother     No Known Problems Father     Hypertension Brother     No Known Problems Daughter     No Known Problems Son        Social History:   Social History     Socioeconomic History    Marital status:    Tobacco Use    Smoking status: Never     Passive exposure: Never    Smokeless tobacco:  Never   Vaping Use    Vaping status: Never Used   Substance and Sexual Activity    Alcohol use: Yes     Alcohol/week: 4.0 standard drinks of alcohol     Types: 4 Cans of beer per week     Comment: occasional beer    Drug use: No    Sexual activity: Defer       Medications:     Current Outpatient Medications:     Acetaminophen (Tylenol) 325 MG capsule, Take  by mouth As Needed., Disp: , Rfl:     amLODIPine (NORVASC) 5 MG tablet, TAKE 1 TABLET BY MOUTH DAILY AND AN EXTRA 1/2 TABLET BY MOUTH IF NEEDED FOR INCREASED BLOOD PRESSURE 150/90, Disp: 135 tablet, Rfl: 0    Artificial Tear Ointment (DRY EYES OP), Administer 2 drops to both eyes Every Morning., Disp: , Rfl:     aspirin 81 MG EC tablet, Take 1 tablet by mouth Daily., Disp: , Rfl:     Calcium Citrate-Vitamin D3 (CITRACAL) 315-6.25 MG-MCG tablet tablet, Take 1 tablet by mouth Daily., Disp: , Rfl:     docusate sodium 100 MG capsule, Take 1 capsule by mouth Daily As Needed., Disp: , Rfl:     losartan-hydrochlorothiazide (HYZAAR) 100-25 MG per tablet, Take 1 tablet by mouth Daily., Disp: 180 tablet, Rfl: 2    magnesium oxide (MAG-OX) 400 MG tablet, Take 1 tablet by mouth Daily., Disp: 30 tablet, Rfl: 11    metoprolol succinate XL (TOPROL-XL) 50 MG 24 hr tablet, Take 1 tablet by mouth Daily., Disp: 30 tablet, Rfl: 3    multivitamin with minerals (MULTIVITAL PO), Take 1 tablet by mouth Daily., Disp: , Rfl:     PreviDent 5000 Booster Plus 1.1 % paste, Daily As Needed., Disp: , Rfl:     rivaroxaban (Xarelto) 20 MG tablet, Take 1 tablet by mouth Daily., Disp: 90 tablet, Rfl: 2    rosuvastatin (CRESTOR) 5 MG tablet, TAKE 1 TABLET BY MOUTH DAILY, Disp: 90 tablet, Rfl: 3    Allergies:   Allergies   Allergen Reactions    Codeine Nausea And Vomiting    Lisinopril Cough    Amiodarone Myalgia       Objective   Vital Signs:   Vitals:    05/02/25 1147   BP: (!) 156/102   BP Location: Right arm   Patient Position: Sitting   Pulse: 68   SpO2: 95%   Weight: 107 kg (235 lb)   Height:  "180.3 cm (71\")       PHYSICAL EXAM  General appearance: Awake, alert, cooperative  Head: Normocephalic, without obvious abnormality, atraumatic  Eyes: Conjunctivae/corneas clear, EOMs intact  Neck: no adenopathy, no carotid bruit, no JVD, and thyroid: not enlarged  Lungs: clear to auscultation bilaterally and no rhonchi or crackles\", ' symmetric  Heart: regular rate and rhythm, S1, S2 normal, no murmur, click, rub or gallop  Abdomen: Soft, non-tender, bowel sounds normal,  no organomegaly  Extremities: extremities normal, atraumatic, no cyanosis or edema  Skin: Skin color, turgor normal, no rashes or lesions  Neurologic: Grossly normal     Lab Results   Component Value Date    GLUCOSE 74 04/04/2024    CALCIUM 9.7 04/04/2024     (L) 04/04/2024    K 3.7 04/04/2024    CO2 32.0 (H) 04/04/2024    CL 97 (L) 04/04/2024    BUN 16 04/04/2024    CREATININE 1.20 04/11/2024    EGFRIFNONA 85 04/30/2021    BCR 15.2 04/04/2024    ANIONGAP 6.0 04/04/2024     Lab Results   Component Value Date    WBC 5.27 04/04/2024    HGB 15.3 04/11/2024    HCT 45 04/11/2024    MCV 89.4 04/04/2024     04/04/2024     No results found for: \"INR\", \"PROTIME\"  Lab Results   Component Value Date    TSH 2.450 04/04/2024       Cardiac Testing:     I personally viewed and interpreted the patient's EKG/Telemetry/lab data    Procedures    Tobacco Cessation: N/A  Obstructive Sleep Apnea Screening: N/A    Advance Care Planning   ACP discussion was declined by the patient. Patient does not have an advance directive, declines further assistance.       Assessment & Plan    Diagnoses and all orders for this visit:    1. PVC's (premature ventricular contractions) (Primary)    2. AF (paroxysmal atrial fibrillation)  Assessment & Plan:  4/11/2024 he underwent successful wide antral circumferential pulmonary vein isolation, posterior wall isolation and ablation of the base of the left atrial appendage by Dr. Pope.  He is chronically anticoagulated with " Xarelto 20 mg po daily.  He is on Metoprolol for arrhythmia suppression.          3. Essential hypertension  Assessment & Plan:    Today's BP           4. LISA (obstructive sleep apnea)  Assessment & Plan:  Non compliant with CPAP therapy              Diagnosis Plan   1. PVC's (premature ventricular contractions)  Asymptomatic Not a clinical issue      2. AF (paroxysmal atrial fibrillation)  Ablated.  No recurrence.  Doing quite well.  Lifelong anticoagulation.    Xarelto is now generic.  He will seek this with his pharmacy.    Metoprolol makes him feel tired.  Stop it today.      3. Essential hypertension  A little bit of whitecoat hypertension today.  Blood pressure at home normal.      4. LISA (obstructive sleep apnea)  CPAP compliant.  Doing well.        Body mass index is 32.78 kg/m².    I spent 37 minutes in consultation with this patient which included more than 65% of this time in direct face-to-face counseling, physical examination and discussion of my assessment and findings and this shared decision making with the patient.  The remainder of the time not spent face-to-face was performing one, some or all of the following actions: preparing to see the patient (e.g. reviewing tests, prior clinicians' notes, etc), ordering medications, tests or procedures, coordination of care, discussion of the plan with other healthcare providers, documenting clinical information in epic as well as independently interpreting results and communication of these results to the patient family and/or caregiver(s).  Please note that this explicitly excludes time spent on other separate billable services such as performing procedures or test interpretation, when applicable.      Follow Up:       Thank you for allowing me to participate in the care of your patient. Please to not hesitate to contact me with additional questions or concerns.      Waldemar Pope, DO, FACC, [facility]  Cardiac Electrophysiologist  [facility] /  [facility]

## 2025-05-02 ENCOUNTER — OFFICE VISIT (OUTPATIENT)
Dept: CARDIOLOGY | Facility: CLINIC | Age: 75
End: 2025-05-02
Payer: MEDICARE

## 2025-05-02 VITALS
DIASTOLIC BLOOD PRESSURE: 102 MMHG | HEIGHT: 71 IN | OXYGEN SATURATION: 95 % | BODY MASS INDEX: 32.9 KG/M2 | HEART RATE: 68 BPM | WEIGHT: 235 LBS | SYSTOLIC BLOOD PRESSURE: 156 MMHG

## 2025-05-02 DIAGNOSIS — I48.0 AF (PAROXYSMAL ATRIAL FIBRILLATION): ICD-10-CM

## 2025-05-02 DIAGNOSIS — I10 ESSENTIAL HYPERTENSION: ICD-10-CM

## 2025-05-02 DIAGNOSIS — G47.33 OSA (OBSTRUCTIVE SLEEP APNEA): ICD-10-CM

## 2025-05-02 DIAGNOSIS — I49.3 PVC'S (PREMATURE VENTRICULAR CONTRACTIONS): Primary | ICD-10-CM

## 2025-05-02 NOTE — ASSESSMENT & PLAN NOTE
4/11/2024 he underwent successful wide antral circumferential pulmonary vein isolation, posterior wall isolation and ablation of the base of the left atrial appendage by Dr. Pope.  He is chronically anticoagulated with Xarelto 20 mg po daily.  He is on Metoprolol for arrhythmia suppression.

## 2025-05-05 ENCOUNTER — OFFICE VISIT (OUTPATIENT)
Dept: CARDIOLOGY | Facility: CLINIC | Age: 75
End: 2025-05-05
Payer: MEDICARE

## 2025-05-05 VITALS
DIASTOLIC BLOOD PRESSURE: 88 MMHG | HEART RATE: 65 BPM | BODY MASS INDEX: 34.16 KG/M2 | SYSTOLIC BLOOD PRESSURE: 170 MMHG | HEIGHT: 71 IN | WEIGHT: 244 LBS

## 2025-05-05 DIAGNOSIS — Z79.01 LONG TERM CURRENT USE OF ANTICOAGULANT: ICD-10-CM

## 2025-05-05 DIAGNOSIS — E78.00 HYPERCHOLESTEREMIA: ICD-10-CM

## 2025-05-05 DIAGNOSIS — G47.33 OSA (OBSTRUCTIVE SLEEP APNEA): ICD-10-CM

## 2025-05-05 DIAGNOSIS — I48.0 PAROXYSMAL ATRIAL FIBRILLATION: ICD-10-CM

## 2025-05-05 DIAGNOSIS — I10 ESSENTIAL HYPERTENSION: ICD-10-CM

## 2025-05-05 DIAGNOSIS — I48.0 PAF (PAROXYSMAL ATRIAL FIBRILLATION): Primary | ICD-10-CM

## 2025-05-05 DIAGNOSIS — E66.811 OBESITY (BMI 30.0-34.9): ICD-10-CM

## 2025-05-05 PROCEDURE — 3077F SYST BP >= 140 MM HG: CPT | Performed by: NURSE PRACTITIONER

## 2025-05-05 PROCEDURE — 99214 OFFICE O/P EST MOD 30 MIN: CPT | Performed by: NURSE PRACTITIONER

## 2025-05-05 PROCEDURE — 3079F DIAST BP 80-89 MM HG: CPT | Performed by: NURSE PRACTITIONER

## 2025-05-05 PROCEDURE — 93000 ELECTROCARDIOGRAM COMPLETE: CPT | Performed by: NURSE PRACTITIONER

## 2025-05-05 RX ORDER — MAGNESIUM OXIDE 400 MG/1
400 TABLET ORAL DAILY
Qty: 90 TABLET | Refills: 2 | Status: SHIPPED | OUTPATIENT
Start: 2025-05-05

## 2025-05-05 RX ORDER — ROSUVASTATIN CALCIUM 5 MG/1
5 TABLET, COATED ORAL DAILY
Qty: 90 TABLET | Refills: 3 | Status: SHIPPED | OUTPATIENT
Start: 2025-05-05

## 2025-05-05 RX ORDER — AMLODIPINE BESYLATE 5 MG/1
5 TABLET ORAL 2 TIMES DAILY
Qty: 180 TABLET | Refills: 2 | Status: SHIPPED | OUTPATIENT
Start: 2025-05-05

## 2025-05-05 NOTE — PROGRESS NOTES
Chief Complaint   Patient presents with    Follow-up     For cardiac management, doing well, pt denies any cardiac problems.     Atrial Fibrillation     Saw Dr Pope on Friday. Metoprolol succ was stopped, pt felt like it made him feel tired.     Labs     PCP monitors routine labs every 6 months, was checked in Dec. To recheck in June for him.     Med Refill     Refills needed on cardiac meds. 90 days to [facility].        Cardiac Complaints  none      Subjective   Orlando Wyile is a 74 y.o. male with HTN, hyperlipidemia, and PAF diagnosed in 2012 when he underwent cardioversion. In September 2016, he saw Dr. Law to establish cardiac care. He had seen another cardiologist prior, had workup and was told everything was normal. His main concern was being on amiodarone for over 3 years. Since his LV function was normal and no ischemia was found, it was decided to stop amiodarone and flecainide was started. EKG has remained sinus.  Later he had more atrial fibrillation and Xarelto therapy started.  Repeat stress and echo shows mildly diminished LV function but no ischemia.  On 12/4/2020 he underwent successful cardioversion and later experienced bradycardia for which Lanoxin was stopped and Lopressor decreased.  Flecainide had been changed to Multaq therapy.  Follow-up EKG was stable showing sinus bradycardia with heart rate of 53 bpm.  In January 2021 cardiac clearance was given for total knee revision.  He had postoperative complications and underwent physical therapy at [facility]. Later antiarrhythmic medication management was changed to sotalol. No mention of aortic aneurysm on US of aorta 2022. He underwent ablation in April 2024 with Dr. Pope, long term anticoagulation continued, antiarrythmic's stopped. Echo 2024 EF 65% with mild MR.    Patient comes today for follow up and denies any new concerns.No CP, SOA, palpitations, dizziness, and syncope. Labs with PCP, checked December, will be checked again  this summer. Refills requested. Has been diagnosed with sleep apnea using CPAP.           Cardiac History  Past Surgical History:   Procedure Laterality Date    AORTAGRAM N/A 05/03/2021    Procedure: COMMON FEMORAL LEFT, RIGHT COMMON FEMORAL, AND RIGHT POSTERIOR TIBIAL ACCESS WITH AORTAGRAM, RIGHT LEG ANGIOGRAM, RIGHT POPLITEAL ARTERY ANGIOPLASTY AND STENT, IVUS OF RIGHT SUPERFICIAL FEMORAL AND POPLITEAL ARTERIES;  Surgeon: Paulo Mcduffie MD;  Location: [facility] HYBRID DEMETRIUS;  Service: Vascular;  Laterality: N/A;  FLOURO- 11 min, 30 sec  DOSE- 35 mL visi  MGY- 34      CARDIAC ELECTROPHYSIOLOGY PROCEDURE N/A 04/11/2024    Procedure: Ablation atrial fibrillation--rhythmia, general anesthesia, preop CTA, DO NOT STOP XARELTO, hold sotolol 3 days;  Surgeon: Waldemar Pope DO;  Location: [facility] EP INVASIVE LOCATION;  Service: Cardiovascular;  Laterality: N/A;    CARDIOVASCULAR STRESS TEST  09/01/2015    @[facility].L. Myoview- Neagtive.    CARDIOVASCULAR STRESS TEST  08/31/2020    3 Min. 15 Secs. 4.6 METS. A.Fib RVR. 113% THR. BP- 153/95. EF 45%. Negative.    CARDIOVERSION  12/04/2020    Cardioverted to Sinus    CARDIOVERSION  01/12/2024    Converted with 200 J    DISTAL POPLITEAL ARTERY BYPASS  10/13/2020    below the knee    ECHO - CONVERTED  09/01/2015    @[facility]. Dr. Parker- EF 65%. Mild MR and AI    ECHO - CONVERTED  08/31/2020    EF 55%. LA- 5.1 Cm.Mod MR. Mild AI. RVSP- 33 mmHG. (A.Fib)    ECHO - CONVERTED  09/30/2024    EF 65%. LA- 4.8 Cm. PEYTON. Mild mR & AI. RVSP- 18 mmHg    KNEE ARTHROPLASTY Bilateral     OTHER SURGICAL HISTORY  12/10/2020    Pulse O2- 84 Min of Hyoxia.    TONSILLECTOMY      TOTAL KNEE ARTHROPLASTY REVISION Bilateral     TRANSESOPHAGEAL ECHOCARDIOGRAM (SANDRITA) AND CARDIOVERSION  11/05/2012    Dr. VIGNESH ANDERSON / TRANSURETHRAL INCISION / DRAINAGE PROSTATE  2023    with laser       Current Outpatient Medications   Medication Sig Dispense Refill    Acetaminophen (Tylenol) 325 MG capsule Take  by mouth As  Needed.      Artificial Tear Ointment (DRY EYES OP) Administer 2 drops to both eyes Every Morning.      aspirin 81 MG EC tablet Take 1 tablet by mouth Daily.      Calcium Citrate-Vitamin D3 (CITRACAL) 315-6.25 MG-MCG tablet tablet Take 1 tablet by mouth Daily.      docusate sodium 100 MG capsule Take 1 capsule by mouth Daily As Needed.      losartan-hydrochlorothiazide (HYZAAR) 100-25 MG per tablet Take 1 tablet by mouth Daily. 180 tablet 2    magnesium oxide (MAG-OX) 400 MG tablet Take 1 tablet by mouth Daily. 90 tablet 2    multivitamin with minerals (MULTIVITAL PO) Take 1 tablet by mouth Daily.      rivaroxaban (Xarelto) 20 MG tablet Take 1 tablet by mouth Daily. 90 tablet 2    rosuvastatin (CRESTOR) 5 MG tablet Take 1 tablet by mouth Daily. 90 tablet 3    amLODIPine (NORVASC) 5 MG tablet Take 1 tablet by mouth 2 (Two) Times a Day. 180 tablet 2     No current facility-administered medications for this visit.       Codeine, Lisinopril, and Amiodarone    Past Medical History:   Diagnosis Date    Arthritis     BPH (benign prostatic hyperplasia)     Cataract     Diverticulosis     Exogenous obesity     H/O prostate biopsy 06/29/2010    negative findings    History of colon polyps     History of knee replacement     History of left knee replacement 06/25/2019    History of transfusion     [place] (hard of hearing)     hearing aids    Hyperlipidemia     Hypertension     PAF (paroxysmal atrial fibrillation)     PVD (peripheral vascular disease)     Sleep apnea     no cpap    Varicose veins        Social History     Socioeconomic History    Marital status:    Tobacco Use    Smoking status: Never     Passive exposure: Never    Smokeless tobacco: Never   Vaping Use    Vaping status: Never Used   Substance and Sexual Activity    Alcohol use: Yes     Alcohol/week: 4.0 standard drinks of alcohol     Types: 4 Cans of beer per week     Comment: occasional beer    Drug use: No    Sexual activity: Defer       Family History  "  Problem Relation Age of Onset    Atrial fibrillation Mother     No Known Problems Father     Hypertension Brother     No Known Problems Daughter     No Known Problems Son        Review of Systems   Constitutional: Negative for malaise/fatigue and night sweats.   Cardiovascular:  Negative for chest pain, claudication, dyspnea on exertion, irregular heartbeat, leg swelling, near-syncope, orthopnea, palpitations and syncope.   Respiratory:  Negative for cough, shortness of breath and wheezing.    Musculoskeletal:  Positive for stiffness. Negative for back pain and joint pain.   Gastrointestinal:  Negative for anorexia, heartburn, nausea and vomiting.   Genitourinary:  Negative for dysuria, hematuria, hesitancy and nocturia.   Neurological:  Negative for dizziness, headaches and light-headedness.   Psychiatric/Behavioral:  Negative for depression and memory loss. The patient is not nervous/anxious.            Objective     /88   Pulse 65   Ht 180.3 cm (71\")   Wt 111 kg (244 lb)   BMI 34.03 kg/m²     Constitutional:       Appearance: Not in distress.   Eyes:      Pupils: Pupils are equal, round, and reactive to light.   HENT:      Nose: Nose normal.   Pulmonary:      Effort: Pulmonary effort is normal.      Breath sounds: Normal breath sounds.   Cardiovascular:      PMI at left midclavicular line. Normal rate. Regular rhythm.      Murmurs: There is a systolic murmur.   Abdominal:      Palpations: Abdomen is soft.   Musculoskeletal: Normal range of motion.      Cervical back: Normal range of motion and neck supple. Skin:     General: Skin is warm and dry.   Neurological:      Mental Status: Alert.           ECG 12 Lead    Date/Time: 5/5/2025 9:07 AM  Performed by: Lolita Joshua APRN    Authorized by: Lolita Joshua APRN  Comparison: compared with previous ECG from 10/14/2024  Similar to previous ECG  Rhythm: sinus rhythm  BPM: 65  Conduction: non-specific intraventricular conduction delay    Clinical " impression: abnormal EKG  Comments: Normal QT               Diagnoses and all orders for this visit:    1. PAF (paroxysmal atrial fibrillation) (Primary)  -     ECG 12 Lead    2. Paroxysmal atrial fibrillation  -     rivaroxaban (Xarelto) 20 MG tablet; Take 1 tablet by mouth Daily.  Dispense: 90 tablet; Refill: 2    3. Essential hypertension    4. Hypercholesteremia    5. LISA (obstructive sleep apnea)    6. Long term current use of anticoagulant    7. Obesity (BMI 30.0-34.9)    Other orders  -     amLODIPine (NORVASC) 5 MG tablet; Take 1 tablet by mouth 2 (Two) Times a Day.  Dispense: 180 tablet; Refill: 2  -     magnesium oxide (MAG-OX) 400 MG tablet; Take 1 tablet by mouth Daily.  Dispense: 90 tablet; Refill: 2  -     rosuvastatin (CRESTOR) 5 MG tablet; Take 1 tablet by mouth Daily.  Dispense: 90 tablet; Refill: 3               PAF: EKG done today for hx of PAF with ablation in April showed a NSR with normal QT. He is still anticoagulated with xarelto per EP recommendations.  Bleeding and bruising denied.     HTN: BP elevated. Will change to norvasc to 5mg BID. He had BB stopped. Hyzaar continued at same. Limited sodium urged.     Hyperlipidemia: On statin therapy with crestor. Patient tolerates well. Limited carb diet urged. Labs to be checked this June. Can we have for review?     Cardiac status: stable. Most recent workup 2020 neg for concerns, no repeat advised as he reports doing well, states he feels better than he has in a long while. Recent echo showed EF stable no sig valve concerns.     PVD: Claudication/ulceration denied. Continue ASA and xarelto.     Sleep apnea: Is compliant with CPAP.      Refills per request.     BMI noted at 34.03, good cardiac diet encouraged.      6 month follow up recommended, or sooner if needed               Problems Addressed this Visit          Cardiac and Vasculature    Essential hypertension    Relevant Medications    amLODIPine (NORVASC) 5 MG tablet    Hypercholesteremia     Relevant Medications    rosuvastatin (CRESTOR) 5 MG tablet       Sleep    LISA (obstructive sleep apnea)     Other Visit Diagnoses         PAF (paroxysmal atrial fibrillation)    -  Primary    Relevant Medications    amLODIPine (NORVASC) 5 MG tablet    Other Relevant Orders    ECG 12 Lead      Paroxysmal atrial fibrillation        Relevant Medications    amLODIPine (NORVASC) 5 MG tablet    rivaroxaban (Xarelto) 20 MG tablet      Long term current use of anticoagulant          Obesity (BMI 30.0-34.9)              Diagnoses         Codes Comments      PAF (paroxysmal atrial fibrillation)    -  Primary ICD-10-CM: I48.0  ICD-9-CM: 427.31       Paroxysmal atrial fibrillation     ICD-10-CM: I48.0  ICD-9-CM: 427.31       Essential hypertension     ICD-10-CM: I10  ICD-9-CM: 401.9       Hypercholesteremia     ICD-10-CM: E78.00  ICD-9-CM: 272.0       LISA (obstructive sleep apnea)     ICD-10-CM: G47.33  ICD-9-CM: 327.23       Long term current use of anticoagulant     ICD-10-CM: Z79.01  ICD-9-CM: V58.61       Obesity (BMI 30.0-34.9)     ICD-10-CM: E66.811  ICD-9-CM: 278.00                       Electronically signed by FARRAH Leung May 5, 2025 10:50 EDT

## (undated) DEVICE — PINNACLE INTRODUCER SHEATH: Brand: PINNACLE

## (undated) DEVICE — ST EXT IV SMARTSITE PINCH/CLMP 5ML 46CM

## (undated) DEVICE — SNAP KOVER: Brand: UNBRANDED

## (undated) DEVICE — NDL HYPO ECLPS SFTY 22G 1 1/2IN

## (undated) DEVICE — STEERABLE SHEATH CLEAR: Brand: FARADRIVE™

## (undated) DEVICE — ANGIO-SEAL VIP VASCULAR CLOSURE DEVICE: Brand: ANGIO-SEAL

## (undated) DEVICE — DRSNG SURESITE123 4X4.8IN

## (undated) DEVICE — CATH IMG IVUS VISIONS PV .018 3.4F

## (undated) DEVICE — GW TRNSEP SAFESEPT LT/ATRIAL RO 135CM .014IN

## (undated) DEVICE — Device: Brand: WEBSTER CS

## (undated) DEVICE — KT INTRO MINISTICK MAX W/GW NITNL/TUNG ECHO STFF 4F 21G 7CM

## (undated) DEVICE — HIGH RESOLUTION MAPPING CATHETER: Brand: INTELLAMAP ORION™

## (undated) DEVICE — ELECTRD RETRN/GRND MEGADYNE SGL/PLT W/CORD 9FT DISP

## (undated) DEVICE — PRESSURE MONITORING SET: Brand: TRUWAVE

## (undated) DEVICE — RADIFOCUS GLIDEWIRE: Brand: GLIDEWIRE

## (undated) DEVICE — UNDERGLV SURG BIOGEL INDICAT PI SZ8 BLU

## (undated) DEVICE — SYS CLS VASC/VENI VASCADE MVP 6TO12F

## (undated) DEVICE — SOL NACL 0.9PCT 1000ML

## (undated) DEVICE — CATH SUP PROC TRAILBLAZER .035IN 135CM PK/5

## (undated) DEVICE — SET PRIMARY GRVTY 10DP MALE LL 104IN

## (undated) DEVICE — INTRO SHEATH FAST/CATH LG/LUM 11F .038IN 12CM

## (undated) DEVICE — GW SAFARI2 PRESH XSM CRV .035IN 3.2X2.9X275CM

## (undated) DEVICE — PERCLOSE PROGLIDE™ SUTURE-MEDIATED CLOSURE SYSTEM: Brand: PERCLOSE PROGLIDE™

## (undated) DEVICE — STERILE (15.2 TAPERED TO 7.6 X 183CM) POLYETHYLENE ACCORDION-FOLDED COVER FOR USE WITH SIEMENS ACUNAV ULTRASOUND CATHETER FAMILY CONNECTOR: Brand: SWIFTLINK TRANSDUCER COVER

## (undated) DEVICE — RADIFOCUS GLIDEWIRE ADVANTAGE GUIDEWIRE: Brand: GLIDEWIRE ADVANTAGE

## (undated) DEVICE — SNAR VASC RETRV ENSNARE STD SYS 7F18X30MM 120CM

## (undated) DEVICE — ULTRAVERSE 035 PTA DILATATION CATHETER 8.0MM X 60MM BALLOON, 130CM SHAFT: Brand: ULTRAVERSE® 035 PTA DILATATION CATHETER

## (undated) DEVICE — OPEN-IRRIGATION TUBING SET: Brand: METRIQ™ IRRIGATION TUBING SET

## (undated) DEVICE — PK ANGIO OR 10

## (undated) DEVICE — ULTRAVERSE® 018 PTA BALLOON DILATATION CATHETER 5 MM X 100 MM, 150 CM CATHETER: Brand: ULTRAVERSE® 018

## (undated) DEVICE — INTRO SHEATH ENGAGE W/50 GW .038 8F12

## (undated) DEVICE — INFLATION DEVICE: Brand: ENCORE™ 26

## (undated) DEVICE — SYRINGE, LOCKING, 10CC, STERILE: Brand: BABY GORILLA/GORILLA PLATING SYSTEM

## (undated) DEVICE — CATHETER CONNECTION CABLE: Brand: FARASTAR™

## (undated) DEVICE — CATH ULTRASND ECHOCARDIOGRAPHY ACUNAV

## (undated) DEVICE — INTRAOPERATIVE COVER KIT, 10 PACK: Brand: SITE-RITE

## (undated) DEVICE — CATH SZ ACCUVU SEG/20CM OMNI .038IN 5F 70CM

## (undated) DEVICE — GLV SURG BIOGEL LTX PF 7 1/2

## (undated) DEVICE — GW THRUWAY SHRT TPR STR 300CM

## (undated) DEVICE — GW STARTER FXD CORE J .035 180CM 3MM

## (undated) DEVICE — TBG INJ CONTRL EXCITE RA 1200PSI 48IN

## (undated) DEVICE — ADULT, W/LG. BACK PAD, RADIOTRANSPARENT ELEMENT AND LEAD WIRE COMPATIBLE W/: Brand: DEFIBRILLATION ELECTRODES

## (undated) DEVICE — SYR LL BD 10CC

## (undated) DEVICE — DOME MONITORING W BONDED STPCK BIOTRANS2

## (undated) DEVICE — SI AVANTI+ 6F STD W/GW  NO OBT: Brand: AVANTI

## (undated) DEVICE — SYR CONTRL LUERLOK 10CC

## (undated) DEVICE — LOCATION REFERENCE PATCH KIT: Brand: RHYTHMIA™ MAPPING SYSTEM

## (undated) DEVICE — INTRO SHEATH PRELUDE IDEAL NITNL PALLADIUM 6F .018IN 7X40CM

## (undated) DEVICE — CVR HNDL LIGHT RIGID

## (undated) DEVICE — ST EXT IV SMRTSTE 2VLV FIX M LL 6ML 41

## (undated) DEVICE — DECANT BG O JET

## (undated) DEVICE — ST INF PRI SMRTSTE 20DRP 2VLV 24ML 117

## (undated) DEVICE — ST ACC MICROPUNCTURE .018 TRANSLSS/PLAT/TP 5F/10CM 21G/10CM

## (undated) DEVICE — PINNACLE R/O II INTRODUCER SHEATH WITH RADIOPAQUE MARKER: Brand: PINNACLE

## (undated) DEVICE — SYS TRNSEP ACC BRK ACROSS A/ 71CM

## (undated) DEVICE — PULSED FIELD ABLATION CATHETER: Brand: FARAWAVE™

## (undated) DEVICE — GW MICRO APPROACH CTO25 .014 300CM

## (undated) DEVICE — KT VLV HEMO MAP ACC PLS LG/BORE MTL/INTRO W/TORQ/DEV

## (undated) DEVICE — Device: Brand: MEDEX

## (undated) DEVICE — INTRO SHEATH TRNSEP .032 SL0 8.5F 63CM